# Patient Record
Sex: MALE | Race: WHITE | Employment: UNEMPLOYED | URBAN - METROPOLITAN AREA
[De-identification: names, ages, dates, MRNs, and addresses within clinical notes are randomized per-mention and may not be internally consistent; named-entity substitution may affect disease eponyms.]

---

## 2019-04-22 ENCOUNTER — HOSPITAL ENCOUNTER (OUTPATIENT)
Dept: OCCUPATIONAL MEDICINE | Age: 54
Discharge: HOME | End: 2019-04-22

## 2019-04-22 DIAGNOSIS — T14.90XA INJURY: ICD-10-CM

## 2020-01-01 ENCOUNTER — APPOINTMENT (OUTPATIENT)
Dept: RADIATION ONCOLOGY | Age: 55
End: 2020-01-01
Payer: MEDICAID

## 2020-01-01 ENCOUNTER — APPOINTMENT (OUTPATIENT)
Dept: RADIATION ONCOLOGY | Age: 55
End: 2020-01-01

## 2020-01-01 ENCOUNTER — ANESTHESIA EVENT (OUTPATIENT)
Dept: ENDOSCOPY | Age: 55
End: 2020-01-01
Payer: MEDICAID

## 2020-01-01 ENCOUNTER — HOSPITAL ENCOUNTER (OUTPATIENT)
Dept: LAB | Age: 55
Discharge: HOME | End: 2020-10-22
Payer: MEDICAID

## 2020-01-01 ENCOUNTER — PATIENT OUTREACH (OUTPATIENT)
Dept: CASE MANAGEMENT | Age: 55
End: 2020-01-01

## 2020-01-01 ENCOUNTER — HOSPITAL ENCOUNTER (OUTPATIENT)
Dept: INTERVENTIONAL RADIOLOGY/VASCULAR | Age: 55
Discharge: HOME | End: 2020-05-05
Attending: INTERNAL MEDICINE
Payer: MEDICAID

## 2020-01-01 ENCOUNTER — HOSPITAL ENCOUNTER (OUTPATIENT)
Dept: RADIATION ONCOLOGY | Age: 55
Discharge: HOME | End: 2020-06-02
Payer: MEDICAID

## 2020-01-01 ENCOUNTER — APPOINTMENT (OUTPATIENT)
Dept: INFUSION THERAPY | Age: 55
End: 2020-01-01

## 2020-01-01 ENCOUNTER — HOSPITAL ENCOUNTER (OUTPATIENT)
Dept: PET IMAGING | Age: 55
Discharge: HOME | End: 2020-09-29
Attending: INTERNAL MEDICINE
Payer: MEDICAID

## 2020-01-01 ENCOUNTER — HOSPITAL ENCOUNTER (OUTPATIENT)
Dept: INFUSION THERAPY | Age: 55
Discharge: HOME | End: 2020-06-12
Payer: MEDICAID

## 2020-01-01 ENCOUNTER — HOSPITAL ENCOUNTER (OUTPATIENT)
Dept: PET IMAGING | Age: 55
Discharge: HOME | End: 2020-04-28
Attending: INTERNAL MEDICINE
Payer: COMMERCIAL

## 2020-01-01 ENCOUNTER — ANESTHESIA (OUTPATIENT)
Dept: ENDOSCOPY | Age: 55
End: 2020-01-01
Payer: MEDICAID

## 2020-01-01 ENCOUNTER — HOSPITAL ENCOUNTER (OUTPATIENT)
Dept: MRI IMAGING | Age: 55
Discharge: HOME | End: 2020-05-01
Attending: INTERNAL MEDICINE
Payer: MEDICAID

## 2020-01-01 ENCOUNTER — HOSPITAL ENCOUNTER (OUTPATIENT)
Dept: MRI IMAGING | Age: 55
Discharge: HOME | End: 2020-06-23
Attending: INTERNAL MEDICINE
Payer: MEDICAID

## 2020-01-01 ENCOUNTER — HOSPITAL ENCOUNTER (OUTPATIENT)
Dept: INFUSION THERAPY | Age: 55
Discharge: HOME | End: 2020-12-05
Payer: MEDICAID

## 2020-01-01 ENCOUNTER — HOSPITAL ENCOUNTER (OUTPATIENT)
Dept: INFUSION THERAPY | Age: 55
End: 2020-01-01

## 2020-01-01 ENCOUNTER — HOSPITAL ENCOUNTER (OUTPATIENT)
Dept: LAB | Age: 55
Discharge: HOME | End: 2020-06-12
Payer: MEDICAID

## 2020-01-01 ENCOUNTER — HOSPITAL ENCOUNTER (OUTPATIENT)
Dept: INFUSION THERAPY | Age: 55
Discharge: HOME | End: 2020-10-24

## 2020-01-01 ENCOUNTER — HOSPITAL ENCOUNTER (OUTPATIENT)
Dept: LAB | Age: 55
Discharge: HOME | End: 2020-11-10
Payer: MEDICAID

## 2020-01-01 ENCOUNTER — HOSPITAL ENCOUNTER (OUTPATIENT)
Dept: INFUSION THERAPY | Age: 55
Discharge: HOME | End: 2020-12-26
Payer: MEDICAID

## 2020-01-01 ENCOUNTER — HOSPITAL ENCOUNTER (OUTPATIENT)
Dept: INFUSION THERAPY | Age: 55
Discharge: HOME | End: 2020-08-21
Payer: MEDICAID

## 2020-01-01 ENCOUNTER — HOSPITAL ENCOUNTER (OUTPATIENT)
Dept: INFUSION THERAPY | Age: 55
End: 2020-01-01
Payer: MEDICAID

## 2020-01-01 ENCOUNTER — HOSPITAL ENCOUNTER (OUTPATIENT)
Dept: CT IMAGING | Age: 55
Discharge: HOME | End: 2020-12-22
Attending: INTERNAL MEDICINE
Payer: MEDICAID

## 2020-01-01 ENCOUNTER — HOSPITAL ENCOUNTER (OUTPATIENT)
Dept: INFUSION THERAPY | Age: 55
Discharge: HOME | End: 2020-10-03
Payer: MEDICAID

## 2020-01-01 ENCOUNTER — HOSPITAL ENCOUNTER (OUTPATIENT)
Dept: LAB | Age: 55
Discharge: HOME | End: 2020-12-23
Payer: MEDICAID

## 2020-01-01 ENCOUNTER — HOSPITAL ENCOUNTER (OUTPATIENT)
Dept: LAB | Age: 55
Discharge: HOME | End: 2020-07-30
Payer: MEDICAID

## 2020-01-01 ENCOUNTER — HOSPITAL ENCOUNTER (OUTPATIENT)
Dept: RADIATION ONCOLOGY | Age: 55
Discharge: HOME | End: 2020-05-26

## 2020-01-01 ENCOUNTER — HOSPITAL ENCOUNTER (OUTPATIENT)
Age: 55
Setting detail: OUTPATIENT SURGERY
Discharge: HOME | End: 2020-04-29
Attending: INTERNAL MEDICINE | Admitting: INTERNAL MEDICINE
Payer: COMMERCIAL

## 2020-01-01 ENCOUNTER — HOSPITAL ENCOUNTER (OUTPATIENT)
Dept: CT IMAGING | Age: 55
Discharge: HOME | End: 2020-10-10
Attending: INTERNAL MEDICINE
Payer: MEDICAID

## 2020-01-01 ENCOUNTER — ANESTHESIA EVENT (OUTPATIENT)
Dept: SURGERY | Age: 55
End: 2020-01-01
Payer: MEDICAID

## 2020-01-01 ENCOUNTER — HOSPITAL ENCOUNTER (OUTPATIENT)
Age: 55
Setting detail: OUTPATIENT SURGERY
Discharge: HOME | End: 2020-06-03
Attending: INTERNAL MEDICINE | Admitting: INTERNAL MEDICINE
Payer: MEDICAID

## 2020-01-01 ENCOUNTER — HOSPITAL ENCOUNTER (OUTPATIENT)
Age: 55
Setting detail: OUTPATIENT SURGERY
Discharge: HOME | End: 2020-05-05
Attending: RADIOLOGY | Admitting: RADIOLOGY
Payer: MEDICAID

## 2020-01-01 ENCOUNTER — HOSPITAL ENCOUNTER (OUTPATIENT)
Dept: RADIATION ONCOLOGY | Age: 55
Discharge: HOME | End: 2020-06-25
Payer: MEDICAID

## 2020-01-01 ENCOUNTER — HOSPITAL ENCOUNTER (OUTPATIENT)
Dept: INFUSION THERAPY | Age: 55
Discharge: HOME | End: 2020-07-31
Payer: MEDICAID

## 2020-01-01 ENCOUNTER — HOSPITAL ENCOUNTER (EMERGENCY)
Age: 55
Discharge: HOME | End: 2020-06-16
Attending: EMERGENCY MEDICINE
Payer: MEDICAID

## 2020-01-01 ENCOUNTER — HOSPITAL ENCOUNTER (OUTPATIENT)
Dept: RADIATION ONCOLOGY | Age: 55
Discharge: HOME | End: 2020-06-29
Payer: MEDICAID

## 2020-01-01 ENCOUNTER — HOSPITAL ENCOUNTER (OUTPATIENT)
Dept: INFUSION THERAPY | Age: 55
Discharge: HOME | End: 2020-06-29
Payer: MEDICAID

## 2020-01-01 ENCOUNTER — HOSPITAL ENCOUNTER (OUTPATIENT)
Dept: LAB | Age: 55
Discharge: HOME | End: 2020-08-20
Payer: MEDICAID

## 2020-01-01 ENCOUNTER — APPOINTMENT (OUTPATIENT)
Dept: GENERAL RADIOLOGY | Age: 55
End: 2020-01-01
Attending: STUDENT IN AN ORGANIZED HEALTH CARE EDUCATION/TRAINING PROGRAM
Payer: MEDICAID

## 2020-01-01 ENCOUNTER — HOSPITAL ENCOUNTER (EMERGENCY)
Age: 55
Discharge: HOME | End: 2020-04-16
Payer: COMMERCIAL

## 2020-01-01 ENCOUNTER — HOSPITAL ENCOUNTER (OUTPATIENT)
Dept: INFUSION THERAPY | Age: 55
Discharge: HOME | End: 2020-06-18
Payer: MEDICAID

## 2020-01-01 ENCOUNTER — APPOINTMENT (OUTPATIENT)
Dept: CT IMAGING | Age: 55
End: 2020-01-01
Attending: EMERGENCY MEDICINE
Payer: MEDICAID

## 2020-01-01 ENCOUNTER — HOSPITAL ENCOUNTER (OUTPATIENT)
Dept: LAB | Age: 55
Discharge: HOME | End: 2020-05-08
Payer: MEDICAID

## 2020-01-01 ENCOUNTER — HOSPITAL ENCOUNTER (OUTPATIENT)
Dept: LAB | Age: 55
Discharge: HOME | End: 2020-12-03
Payer: MEDICAID

## 2020-01-01 ENCOUNTER — HOSPITAL ENCOUNTER (OUTPATIENT)
Dept: LAB | Age: 55
Discharge: HOME | End: 2020-09-10
Payer: MEDICAID

## 2020-01-01 ENCOUNTER — HOSPITAL ENCOUNTER (OUTPATIENT)
Dept: INFUSION THERAPY | Age: 55
Discharge: HOME | End: 2020-09-12
Payer: MEDICAID

## 2020-01-01 ENCOUNTER — APPOINTMENT (OUTPATIENT)
Dept: CT IMAGING | Age: 55
End: 2020-01-01
Payer: COMMERCIAL

## 2020-01-01 ENCOUNTER — HOSPITAL ENCOUNTER (OUTPATIENT)
Dept: LAB | Age: 55
Discharge: HOME | End: 2020-10-01
Payer: MEDICAID

## 2020-01-01 ENCOUNTER — ANESTHESIA (OUTPATIENT)
Dept: SURGERY | Age: 55
End: 2020-01-01
Payer: MEDICAID

## 2020-01-01 ENCOUNTER — HOSPITAL ENCOUNTER (OUTPATIENT)
Dept: MRI IMAGING | Age: 55
Discharge: HOME | End: 2020-09-15
Attending: NURSE PRACTITIONER
Payer: MEDICAID

## 2020-01-01 ENCOUNTER — HOSPITAL ENCOUNTER (OUTPATIENT)
Dept: RADIATION ONCOLOGY | Age: 55
Discharge: HOME | End: 2020-05-04
Payer: MEDICAID

## 2020-01-01 ENCOUNTER — HOSPITAL ENCOUNTER (OUTPATIENT)
Dept: INFUSION THERAPY | Age: 55
Discharge: HOME | End: 2020-11-14
Payer: MEDICAID

## 2020-01-01 ENCOUNTER — HOSPITAL ENCOUNTER (EMERGENCY)
Age: 55
Discharge: HOME | End: 2020-07-02
Attending: STUDENT IN AN ORGANIZED HEALTH CARE EDUCATION/TRAINING PROGRAM
Payer: MEDICAID

## 2020-01-01 ENCOUNTER — NURSE NAVIGATOR (OUTPATIENT)
Dept: CASE MANAGEMENT | Age: 55
End: 2020-01-01

## 2020-01-01 ENCOUNTER — HOSPITAL ENCOUNTER (OUTPATIENT)
Dept: RADIATION ONCOLOGY | Age: 55
Discharge: HOME | End: 2020-06-04
Payer: MEDICAID

## 2020-01-01 ENCOUNTER — APPOINTMENT (OUTPATIENT)
Dept: GENERAL RADIOLOGY | Age: 55
End: 2020-01-01
Attending: EMERGENCY MEDICINE
Payer: MEDICAID

## 2020-01-01 ENCOUNTER — HOSPITAL ENCOUNTER (OUTPATIENT)
Dept: RADIATION ONCOLOGY | Age: 55
Discharge: HOME | End: 2020-06-30
Payer: MEDICAID

## 2020-01-01 VITALS
WEIGHT: 242 LBS | BODY MASS INDEX: 33.88 KG/M2 | HEART RATE: 70 BPM | OXYGEN SATURATION: 97 % | HEIGHT: 71 IN | SYSTOLIC BLOOD PRESSURE: 140 MMHG | DIASTOLIC BLOOD PRESSURE: 62 MMHG | RESPIRATION RATE: 16 BRPM | TEMPERATURE: 98.1 F

## 2020-01-01 VITALS
OXYGEN SATURATION: 95 % | HEART RATE: 65 BPM | BODY MASS INDEX: 33.21 KG/M2 | WEIGHT: 237.2 LBS | TEMPERATURE: 97.7 F | SYSTOLIC BLOOD PRESSURE: 150 MMHG | RESPIRATION RATE: 18 BRPM | DIASTOLIC BLOOD PRESSURE: 80 MMHG | HEIGHT: 71 IN

## 2020-01-01 VITALS
HEART RATE: 73 BPM | WEIGHT: 235.8 LBS | BODY MASS INDEX: 32.89 KG/M2 | DIASTOLIC BLOOD PRESSURE: 92 MMHG | TEMPERATURE: 96.9 F | OXYGEN SATURATION: 95 % | RESPIRATION RATE: 18 BRPM | SYSTOLIC BLOOD PRESSURE: 156 MMHG

## 2020-01-01 VITALS
RESPIRATION RATE: 25 BRPM | WEIGHT: 246 LBS | SYSTOLIC BLOOD PRESSURE: 177 MMHG | OXYGEN SATURATION: 94 % | DIASTOLIC BLOOD PRESSURE: 79 MMHG | HEART RATE: 67 BPM | BODY MASS INDEX: 34.44 KG/M2 | HEIGHT: 71 IN | TEMPERATURE: 97.6 F

## 2020-01-01 VITALS
SYSTOLIC BLOOD PRESSURE: 154 MMHG | TEMPERATURE: 97.6 F | WEIGHT: 235.8 LBS | HEART RATE: 77 BPM | OXYGEN SATURATION: 96 % | DIASTOLIC BLOOD PRESSURE: 94 MMHG | BODY MASS INDEX: 32.89 KG/M2 | RESPIRATION RATE: 16 BRPM

## 2020-01-01 VITALS
OXYGEN SATURATION: 97 % | RESPIRATION RATE: 15 BRPM | HEART RATE: 66 BPM | DIASTOLIC BLOOD PRESSURE: 86 MMHG | SYSTOLIC BLOOD PRESSURE: 141 MMHG | TEMPERATURE: 97.8 F

## 2020-01-01 VITALS
TEMPERATURE: 96.9 F | SYSTOLIC BLOOD PRESSURE: 140 MMHG | OXYGEN SATURATION: 97 % | DIASTOLIC BLOOD PRESSURE: 82 MMHG | WEIGHT: 232 LBS | BODY MASS INDEX: 32.36 KG/M2 | RESPIRATION RATE: 18 BRPM | HEART RATE: 66 BPM

## 2020-01-01 VITALS
SYSTOLIC BLOOD PRESSURE: 122 MMHG | TEMPERATURE: 97.6 F | HEART RATE: 71 BPM | OXYGEN SATURATION: 92 % | DIASTOLIC BLOOD PRESSURE: 88 MMHG

## 2020-01-01 VITALS
DIASTOLIC BLOOD PRESSURE: 73 MMHG | TEMPERATURE: 97.1 F | HEART RATE: 65 BPM | BODY MASS INDEX: 33.77 KG/M2 | OXYGEN SATURATION: 96 % | WEIGHT: 242.1 LBS | SYSTOLIC BLOOD PRESSURE: 144 MMHG

## 2020-01-01 VITALS
BODY MASS INDEX: 33.6 KG/M2 | OXYGEN SATURATION: 97 % | SYSTOLIC BLOOD PRESSURE: 167 MMHG | WEIGHT: 240 LBS | DIASTOLIC BLOOD PRESSURE: 87 MMHG | HEIGHT: 71 IN | RESPIRATION RATE: 16 BRPM | TEMPERATURE: 98 F | HEART RATE: 70 BPM

## 2020-01-01 VITALS
HEIGHT: 70 IN | WEIGHT: 260 LBS | OXYGEN SATURATION: 98 % | TEMPERATURE: 98.4 F | RESPIRATION RATE: 17 BRPM | HEART RATE: 78 BPM | DIASTOLIC BLOOD PRESSURE: 78 MMHG | SYSTOLIC BLOOD PRESSURE: 142 MMHG | BODY MASS INDEX: 37.22 KG/M2

## 2020-01-01 VITALS
SYSTOLIC BLOOD PRESSURE: 155 MMHG | BODY MASS INDEX: 32.89 KG/M2 | TEMPERATURE: 97.4 F | OXYGEN SATURATION: 97 % | DIASTOLIC BLOOD PRESSURE: 89 MMHG | WEIGHT: 235.8 LBS | RESPIRATION RATE: 18 BRPM | HEART RATE: 70 BPM

## 2020-01-01 VITALS
HEART RATE: 59 BPM | SYSTOLIC BLOOD PRESSURE: 126 MMHG | BODY MASS INDEX: 33.74 KG/M2 | WEIGHT: 241 LBS | OXYGEN SATURATION: 94 % | DIASTOLIC BLOOD PRESSURE: 70 MMHG | RESPIRATION RATE: 14 BRPM | HEIGHT: 71 IN | TEMPERATURE: 98.6 F

## 2020-01-01 VITALS
DIASTOLIC BLOOD PRESSURE: 77 MMHG | SYSTOLIC BLOOD PRESSURE: 134 MMHG | TEMPERATURE: 97.8 F | OXYGEN SATURATION: 96 % | WEIGHT: 246.2 LBS | HEART RATE: 65 BPM | BODY MASS INDEX: 34.34 KG/M2

## 2020-01-01 VITALS
RESPIRATION RATE: 18 BRPM | TEMPERATURE: 97.2 F | BODY MASS INDEX: 32.72 KG/M2 | SYSTOLIC BLOOD PRESSURE: 146 MMHG | OXYGEN SATURATION: 96 % | DIASTOLIC BLOOD PRESSURE: 87 MMHG | HEART RATE: 73 BPM | WEIGHT: 234.6 LBS

## 2020-01-01 VITALS
OXYGEN SATURATION: 93 % | RESPIRATION RATE: 18 BRPM | BODY MASS INDEX: 32.93 KG/M2 | TEMPERATURE: 97.4 F | HEART RATE: 87 BPM | SYSTOLIC BLOOD PRESSURE: 141 MMHG | WEIGHT: 235.2 LBS | DIASTOLIC BLOOD PRESSURE: 93 MMHG | HEIGHT: 71 IN

## 2020-01-01 VITALS
HEART RATE: 61 BPM | OXYGEN SATURATION: 96 % | RESPIRATION RATE: 16 BRPM | SYSTOLIC BLOOD PRESSURE: 139 MMHG | DIASTOLIC BLOOD PRESSURE: 63 MMHG | TEMPERATURE: 97.7 F

## 2020-01-01 DIAGNOSIS — R91.8 MASS OF RIGHT LUNG: ICD-10-CM

## 2020-01-01 DIAGNOSIS — C34.82 MALIGNANT NEOPLASM OF OVERLAPPING SITES OF LEFT LUNG (HCC): ICD-10-CM

## 2020-01-01 DIAGNOSIS — C34.82 MALIGNANT NEOPLASM OF OVERLAPPING SITES OF LEFT LUNG (HCC): Primary | ICD-10-CM

## 2020-01-01 DIAGNOSIS — C79.31 BRAIN METASTASES (HCC): ICD-10-CM

## 2020-01-01 DIAGNOSIS — Z79.899 HIGH RISK MEDICATION USE: ICD-10-CM

## 2020-01-01 DIAGNOSIS — R11.2 NAUSEA AND VOMITING, INTRACTABILITY OF VOMITING NOT SPECIFIED, UNSPECIFIED VOMITING TYPE: Primary | ICD-10-CM

## 2020-01-01 DIAGNOSIS — R06.02 SOB (SHORTNESS OF BREATH): Primary | ICD-10-CM

## 2020-01-01 DIAGNOSIS — E53.8 B12 DEFICIENCY: Primary | ICD-10-CM

## 2020-01-01 DIAGNOSIS — R91.8 LUNG MASS: ICD-10-CM

## 2020-01-01 DIAGNOSIS — C34.2 LUNG CANCER, MIDDLE LOBE (HCC): ICD-10-CM

## 2020-01-01 DIAGNOSIS — R06.09 DYSPNEA ON EXERTION: ICD-10-CM

## 2020-01-01 DIAGNOSIS — F19.982 DRUG-INDUCED INSOMNIA (HCC): ICD-10-CM

## 2020-01-01 DIAGNOSIS — Z01.818 PRE-OP TESTING: Primary | ICD-10-CM

## 2020-01-01 DIAGNOSIS — C34.90 NON-SMALL CELL LUNG CANCER, UNSPECIFIED LATERALITY (HCC): ICD-10-CM

## 2020-01-01 DIAGNOSIS — R22.2 NODULE OF ANTERIOR CHEST WALL: ICD-10-CM

## 2020-01-01 DIAGNOSIS — R07.89 ATYPICAL CHEST PAIN: Primary | ICD-10-CM

## 2020-01-01 LAB
ALBUMIN SERPL-MCNC: 3.3 G/DL (ref 3.5–5)
ALBUMIN SERPL-MCNC: 3.4 G/DL (ref 3.5–5)
ALBUMIN SERPL-MCNC: 3.5 G/DL (ref 3.5–5)
ALBUMIN SERPL-MCNC: 3.7 G/DL (ref 3.5–5)
ALBUMIN SERPL-MCNC: 3.7 G/DL (ref 3.5–5)
ALBUMIN SERPL-MCNC: 3.8 G/DL (ref 3.5–5)
ALBUMIN SERPL-MCNC: 3.9 G/DL (ref 3.5–5)
ALBUMIN/GLOB SERPL: 0.8 {RATIO} (ref 1.2–3.5)
ALBUMIN/GLOB SERPL: 0.9 {RATIO} (ref 1.2–3.5)
ALBUMIN/GLOB SERPL: 1 {RATIO} (ref 1.2–3.5)
ALP SERPL-CCNC: 119 U/L (ref 50–136)
ALP SERPL-CCNC: 122 U/L (ref 50–136)
ALP SERPL-CCNC: 126 U/L (ref 50–136)
ALP SERPL-CCNC: 128 U/L (ref 50–136)
ALP SERPL-CCNC: 132 U/L (ref 50–136)
ALP SERPL-CCNC: 133 U/L (ref 50–136)
ALP SERPL-CCNC: 134 U/L (ref 50–136)
ALP SERPL-CCNC: 135 U/L (ref 50–136)
ALP SERPL-CCNC: 139 U/L (ref 50–136)
ALP SERPL-CCNC: 144 U/L (ref 50–136)
ALP SERPL-CCNC: 146 U/L (ref 50–136)
ALP SERPL-CCNC: 146 U/L (ref 50–136)
ALP SERPL-CCNC: 148 U/L (ref 50–136)
ALT SERPL-CCNC: 107 U/L (ref 12–65)
ALT SERPL-CCNC: 116 U/L (ref 12–65)
ALT SERPL-CCNC: 33 U/L (ref 12–65)
ALT SERPL-CCNC: 34 U/L (ref 12–65)
ALT SERPL-CCNC: 36 U/L (ref 12–65)
ALT SERPL-CCNC: 37 U/L (ref 12–65)
ALT SERPL-CCNC: 38 U/L (ref 12–65)
ALT SERPL-CCNC: 40 U/L (ref 12–65)
ALT SERPL-CCNC: 44 U/L (ref 12–65)
ALT SERPL-CCNC: 44 U/L (ref 12–65)
ALT SERPL-CCNC: 47 U/L (ref 12–65)
ALT SERPL-CCNC: 51 U/L (ref 12–65)
ALT SERPL-CCNC: 58 U/L (ref 12–65)
ANION GAP SERPL CALC-SCNC: 3 MMOL/L (ref 7–16)
ANION GAP SERPL CALC-SCNC: 4 MMOL/L (ref 7–16)
ANION GAP SERPL CALC-SCNC: 4 MMOL/L (ref 7–16)
ANION GAP SERPL CALC-SCNC: 5 MMOL/L (ref 7–16)
ANION GAP SERPL CALC-SCNC: 6 MMOL/L (ref 7–16)
ANION GAP SERPL CALC-SCNC: 8 MMOL/L (ref 7–16)
ANION GAP SERPL CALC-SCNC: 9 MMOL/L (ref 7–16)
AST SERPL-CCNC: 16 U/L (ref 15–37)
AST SERPL-CCNC: 19 U/L (ref 15–37)
AST SERPL-CCNC: 21 U/L (ref 15–37)
AST SERPL-CCNC: 23 U/L (ref 15–37)
AST SERPL-CCNC: 23 U/L (ref 15–37)
AST SERPL-CCNC: 25 U/L (ref 15–37)
AST SERPL-CCNC: 28 U/L (ref 15–37)
AST SERPL-CCNC: 28 U/L (ref 15–37)
AST SERPL-CCNC: 29 U/L (ref 15–37)
AST SERPL-CCNC: 35 U/L (ref 15–37)
AST SERPL-CCNC: 35 U/L (ref 15–37)
AST SERPL-CCNC: 42 U/L (ref 15–37)
AST SERPL-CCNC: 64 U/L (ref 15–37)
ATRIAL RATE: 73 BPM
ATRIAL RATE: 79 BPM
BACTERIA SPEC CULT: NORMAL
BACTERIA SPEC CULT: NORMAL
BASOPHILS # BLD: 0 K/UL (ref 0–0.2)
BASOPHILS # BLD: 0.1 K/UL (ref 0–0.2)
BASOPHILS NFR BLD: 0 % (ref 0–2)
BASOPHILS NFR BLD: 1 % (ref 0–2)
BILIRUB SERPL-MCNC: 0.3 MG/DL (ref 0.2–1.1)
BILIRUB SERPL-MCNC: 0.3 MG/DL (ref 0.2–1.1)
BILIRUB SERPL-MCNC: 0.4 MG/DL (ref 0.2–1.1)
BILIRUB SERPL-MCNC: 0.5 MG/DL (ref 0.2–1.1)
BNP SERPL-MCNC: 24 PG/ML (ref 5–125)
BUN SERPL-MCNC: 10 MG/DL (ref 6–23)
BUN SERPL-MCNC: 11 MG/DL (ref 6–23)
BUN SERPL-MCNC: 11 MG/DL (ref 6–23)
BUN SERPL-MCNC: 12 MG/DL (ref 6–23)
BUN SERPL-MCNC: 16 MG/DL (ref 6–23)
BUN SERPL-MCNC: 5 MG/DL (ref 6–23)
BUN SERPL-MCNC: 8 MG/DL (ref 6–23)
BUN SERPL-MCNC: 9 MG/DL (ref 6–23)
CALCIUM SERPL-MCNC: 8.9 MG/DL (ref 8.3–10.4)
CALCIUM SERPL-MCNC: 9 MG/DL (ref 8.3–10.4)
CALCIUM SERPL-MCNC: 9.2 MG/DL (ref 8.3–10.4)
CALCIUM SERPL-MCNC: 9.3 MG/DL (ref 8.3–10.4)
CALCIUM SERPL-MCNC: 9.3 MG/DL (ref 8.3–10.4)
CALCIUM SERPL-MCNC: 9.4 MG/DL (ref 8.3–10.4)
CALCIUM SERPL-MCNC: 9.5 MG/DL (ref 8.3–10.4)
CALCIUM SERPL-MCNC: 9.6 MG/DL (ref 8.3–10.4)
CALCIUM SERPL-MCNC: 9.8 MG/DL (ref 8.3–10.4)
CALCULATED P AXIS, ECG09: 56 DEGREES
CALCULATED P AXIS, ECG09: 61 DEGREES
CALCULATED R AXIS, ECG10: -35 DEGREES
CALCULATED R AXIS, ECG10: -70 DEGREES
CALCULATED T AXIS, ECG11: 56 DEGREES
CALCULATED T AXIS, ECG11: 74 DEGREES
CHLORIDE SERPL-SCNC: 101 MMOL/L (ref 98–107)
CHLORIDE SERPL-SCNC: 102 MMOL/L (ref 98–107)
CHLORIDE SERPL-SCNC: 103 MMOL/L (ref 98–107)
CHLORIDE SERPL-SCNC: 99 MMOL/L (ref 98–107)
CO2 SERPL-SCNC: 26 MMOL/L (ref 21–32)
CO2 SERPL-SCNC: 27 MMOL/L (ref 21–32)
CO2 SERPL-SCNC: 28 MMOL/L (ref 21–32)
CO2 SERPL-SCNC: 29 MMOL/L (ref 21–32)
CO2 SERPL-SCNC: 30 MMOL/L (ref 21–32)
CREAT BLD-MCNC: 1.3 MG/DL (ref 0.8–1.5)
CREAT SERPL-MCNC: 0.83 MG/DL (ref 0.8–1.5)
CREAT SERPL-MCNC: 0.93 MG/DL (ref 0.8–1.5)
CREAT SERPL-MCNC: 1 MG/DL (ref 0.8–1.5)
CREAT SERPL-MCNC: 1.1 MG/DL (ref 0.8–1.5)
CREAT SERPL-MCNC: 1.12 MG/DL (ref 0.8–1.5)
CREAT SERPL-MCNC: 1.2 MG/DL (ref 0.8–1.5)
CREAT SERPL-MCNC: 1.4 MG/DL (ref 0.8–1.5)
DIAGNOSIS, 93000: NORMAL
DIAGNOSIS, 93000: NORMAL
DIFFERENTIAL METHOD BLD: ABNORMAL
EOSINOPHIL # BLD: 0.1 K/UL (ref 0–0.8)
EOSINOPHIL # BLD: 0.2 K/UL (ref 0–0.8)
EOSINOPHIL # BLD: 0.3 K/UL (ref 0–0.8)
EOSINOPHIL # BLD: 0.3 K/UL (ref 0–0.8)
EOSINOPHIL # BLD: 0.4 K/UL (ref 0–0.8)
EOSINOPHIL NFR BLD: 1 % (ref 0.5–7.8)
EOSINOPHIL NFR BLD: 2 % (ref 0.5–7.8)
EOSINOPHIL NFR BLD: 3 % (ref 0.5–7.8)
EOSINOPHIL NFR BLD: 4 % (ref 0.5–7.8)
EOSINOPHIL NFR BLD: 4 % (ref 0.5–7.8)
ERYTHROCYTE [DISTWIDTH] IN BLOOD BY AUTOMATED COUNT: 13.2 % (ref 11.9–14.6)
ERYTHROCYTE [DISTWIDTH] IN BLOOD BY AUTOMATED COUNT: 13.3 % (ref 11.9–14.6)
ERYTHROCYTE [DISTWIDTH] IN BLOOD BY AUTOMATED COUNT: 13.4 % (ref 11.9–14.6)
ERYTHROCYTE [DISTWIDTH] IN BLOOD BY AUTOMATED COUNT: 13.4 % (ref 11.9–14.6)
ERYTHROCYTE [DISTWIDTH] IN BLOOD BY AUTOMATED COUNT: 13.8 % (ref 11.9–14.6)
ERYTHROCYTE [DISTWIDTH] IN BLOOD BY AUTOMATED COUNT: 13.8 % (ref 11.9–14.6)
ERYTHROCYTE [DISTWIDTH] IN BLOOD BY AUTOMATED COUNT: 14.4 % (ref 11.9–14.6)
ERYTHROCYTE [DISTWIDTH] IN BLOOD BY AUTOMATED COUNT: 15.5 % (ref 11.9–14.6)
ERYTHROCYTE [DISTWIDTH] IN BLOOD BY AUTOMATED COUNT: 15.8 % (ref 11.9–14.6)
ERYTHROCYTE [DISTWIDTH] IN BLOOD BY AUTOMATED COUNT: 16 % (ref 11.9–14.6)
ERYTHROCYTE [DISTWIDTH] IN BLOOD BY AUTOMATED COUNT: 17.8 % (ref 11.9–14.6)
ERYTHROCYTE [DISTWIDTH] IN BLOOD BY AUTOMATED COUNT: 18.9 % (ref 11.9–14.6)
ERYTHROCYTE [DISTWIDTH] IN BLOOD BY AUTOMATED COUNT: 20.9 % (ref 11.9–14.6)
GLOBULIN SER CALC-MCNC: 3.6 G/DL (ref 2.3–3.5)
GLOBULIN SER CALC-MCNC: 4.1 G/DL (ref 2.3–3.5)
GLOBULIN SER CALC-MCNC: 4.1 G/DL (ref 2.3–3.5)
GLOBULIN SER CALC-MCNC: 4.2 G/DL (ref 2.3–3.5)
GLOBULIN SER CALC-MCNC: 4.3 G/DL (ref 2.3–3.5)
GLOBULIN SER CALC-MCNC: 4.3 G/DL (ref 2.3–3.5)
GLOBULIN SER CALC-MCNC: 4.4 G/DL (ref 2.3–3.5)
GLOBULIN SER CALC-MCNC: 4.5 G/DL (ref 2.3–3.5)
GLOBULIN SER CALC-MCNC: 4.6 G/DL (ref 2.3–3.5)
GLOBULIN SER CALC-MCNC: 4.7 G/DL (ref 2.3–3.5)
GLUCOSE SERPL-MCNC: 106 MG/DL (ref 65–100)
GLUCOSE SERPL-MCNC: 106 MG/DL (ref 65–100)
GLUCOSE SERPL-MCNC: 107 MG/DL (ref 65–100)
GLUCOSE SERPL-MCNC: 109 MG/DL (ref 65–100)
GLUCOSE SERPL-MCNC: 119 MG/DL (ref 65–100)
GLUCOSE SERPL-MCNC: 125 MG/DL (ref 65–100)
GLUCOSE SERPL-MCNC: 156 MG/DL (ref 65–100)
GLUCOSE SERPL-MCNC: 82 MG/DL (ref 65–100)
GLUCOSE SERPL-MCNC: 86 MG/DL (ref 65–100)
GLUCOSE SERPL-MCNC: 88 MG/DL (ref 65–100)
GLUCOSE SERPL-MCNC: 92 MG/DL (ref 65–100)
GLUCOSE SERPL-MCNC: 95 MG/DL (ref 65–100)
GLUCOSE SERPL-MCNC: 99 MG/DL (ref 65–100)
HCT VFR BLD AUTO: 28.4 % (ref 41.1–50.3)
HCT VFR BLD AUTO: 29 % (ref 41.1–50.3)
HCT VFR BLD AUTO: 29.2 % (ref 41.1–50.3)
HCT VFR BLD AUTO: 29.7 % (ref 41.1–50.3)
HCT VFR BLD AUTO: 31.8 % (ref 41.1–50.3)
HCT VFR BLD AUTO: 32 % (ref 41.1–50.3)
HCT VFR BLD AUTO: 32.7 % (ref 41.1–50.3)
HCT VFR BLD AUTO: 36 % (ref 41.1–50.3)
HCT VFR BLD AUTO: 36.7 % (ref 41.1–50.3)
HCT VFR BLD AUTO: 37.9 % (ref 41.1–50.3)
HCT VFR BLD AUTO: 38.2 % (ref 41.1–50.3)
HCT VFR BLD AUTO: 39.7 % (ref 41.1–50.3)
HCT VFR BLD AUTO: 39.9 % (ref 41.1–50.3)
HGB BLD-MCNC: 10.6 G/DL (ref 13.6–17.2)
HGB BLD-MCNC: 10.6 G/DL (ref 13.6–17.2)
HGB BLD-MCNC: 11.1 G/DL (ref 13.6–17.2)
HGB BLD-MCNC: 12.1 G/DL (ref 13.6–17.2)
HGB BLD-MCNC: 12.3 G/DL (ref 13.6–17.2)
HGB BLD-MCNC: 12.4 G/DL (ref 13.6–17.2)
HGB BLD-MCNC: 13 G/DL (ref 13.6–17.2)
HGB BLD-MCNC: 13.4 G/DL (ref 13.6–17.2)
HGB BLD-MCNC: 13.7 G/DL (ref 13.6–17.2)
HGB BLD-MCNC: 9.5 G/DL (ref 13.6–17.2)
HGB BLD-MCNC: 9.7 G/DL (ref 13.6–17.2)
HGB BLD-MCNC: 9.9 G/DL (ref 13.6–17.2)
HGB BLD-MCNC: 9.9 G/DL (ref 13.6–17.2)
IMM GRANULOCYTES # BLD AUTO: 0 K/UL (ref 0–0.5)
IMM GRANULOCYTES # BLD AUTO: 0 K/UL (ref 0–0.5)
IMM GRANULOCYTES # BLD AUTO: 0.1 K/UL (ref 0–0.5)
IMM GRANULOCYTES # BLD AUTO: 0.2 K/UL (ref 0–0.5)
IMM GRANULOCYTES NFR BLD AUTO: 1 % (ref 0–5)
IMM GRANULOCYTES NFR BLD AUTO: 2 % (ref 0–5)
IMM GRANULOCYTES NFR BLD AUTO: 3 % (ref 0–5)
LACTATE SERPL-SCNC: 0.5 MMOL/L (ref 0.4–2)
LACTATE SERPL-SCNC: 1.4 MMOL/L (ref 0.4–2)
LIPASE SERPL-CCNC: 228 U/L (ref 73–393)
LYMPHOCYTES # BLD: 1.4 K/UL (ref 0.5–4.6)
LYMPHOCYTES # BLD: 1.5 K/UL (ref 0.5–4.6)
LYMPHOCYTES # BLD: 1.7 K/UL (ref 0.5–4.6)
LYMPHOCYTES # BLD: 1.7 K/UL (ref 0.5–4.6)
LYMPHOCYTES # BLD: 1.8 K/UL (ref 0.5–4.6)
LYMPHOCYTES # BLD: 1.9 K/UL (ref 0.5–4.6)
LYMPHOCYTES # BLD: 1.9 K/UL (ref 0.5–4.6)
LYMPHOCYTES # BLD: 2 K/UL (ref 0.5–4.6)
LYMPHOCYTES NFR BLD: 16 % (ref 13–44)
LYMPHOCYTES NFR BLD: 19 % (ref 13–44)
LYMPHOCYTES NFR BLD: 20 % (ref 13–44)
LYMPHOCYTES NFR BLD: 21 % (ref 13–44)
LYMPHOCYTES NFR BLD: 22 % (ref 13–44)
LYMPHOCYTES NFR BLD: 22 % (ref 13–44)
LYMPHOCYTES NFR BLD: 23 % (ref 13–44)
LYMPHOCYTES NFR BLD: 23 % (ref 13–44)
LYMPHOCYTES NFR BLD: 24 % (ref 13–44)
LYMPHOCYTES NFR BLD: 25 % (ref 13–44)
LYMPHOCYTES NFR BLD: 25 % (ref 13–44)
LYMPHOCYTES NFR BLD: 26 % (ref 13–44)
LYMPHOCYTES NFR BLD: 28 % (ref 13–44)
MAGNESIUM SERPL-MCNC: 1.8 MG/DL (ref 1.8–2.4)
MAGNESIUM SERPL-MCNC: 2 MG/DL (ref 1.8–2.4)
MAGNESIUM SERPL-MCNC: 2.2 MG/DL (ref 1.8–2.4)
MAGNESIUM SERPL-MCNC: 2.3 MG/DL (ref 1.8–2.4)
MCH RBC QN AUTO: 30 PG (ref 26.1–32.9)
MCH RBC QN AUTO: 30.2 PG (ref 26.1–32.9)
MCH RBC QN AUTO: 30.3 PG (ref 26.1–32.9)
MCH RBC QN AUTO: 30.4 PG (ref 26.1–32.9)
MCH RBC QN AUTO: 30.4 PG (ref 26.1–32.9)
MCH RBC QN AUTO: 31 PG (ref 26.1–32.9)
MCH RBC QN AUTO: 31.1 PG (ref 26.1–32.9)
MCH RBC QN AUTO: 32.1 PG (ref 26.1–32.9)
MCH RBC QN AUTO: 32.7 PG (ref 26.1–32.9)
MCH RBC QN AUTO: 33.3 PG (ref 26.1–32.9)
MCH RBC QN AUTO: 33.4 PG (ref 26.1–32.9)
MCH RBC QN AUTO: 33.4 PG (ref 26.1–32.9)
MCH RBC QN AUTO: 33.8 PG (ref 26.1–32.9)
MCHC RBC AUTO-ENTMCNC: 32.7 G/DL (ref 31.4–35)
MCHC RBC AUTO-ENTMCNC: 33.1 G/DL (ref 31.4–35)
MCHC RBC AUTO-ENTMCNC: 33.3 G/DL (ref 31.4–35)
MCHC RBC AUTO-ENTMCNC: 33.3 G/DL (ref 31.4–35)
MCHC RBC AUTO-ENTMCNC: 33.4 G/DL (ref 31.4–35)
MCHC RBC AUTO-ENTMCNC: 33.5 G/DL (ref 31.4–35)
MCHC RBC AUTO-ENTMCNC: 33.5 G/DL (ref 31.4–35)
MCHC RBC AUTO-ENTMCNC: 33.6 G/DL (ref 31.4–35)
MCHC RBC AUTO-ENTMCNC: 33.8 G/DL (ref 31.4–35)
MCHC RBC AUTO-ENTMCNC: 33.9 G/DL (ref 31.4–35)
MCHC RBC AUTO-ENTMCNC: 33.9 G/DL (ref 31.4–35)
MCHC RBC AUTO-ENTMCNC: 34 G/DL (ref 31.4–35)
MCHC RBC AUTO-ENTMCNC: 34.3 G/DL (ref 31.4–35)
MCV RBC AUTO: 100.3 FL (ref 79.6–97.8)
MCV RBC AUTO: 100.3 FL (ref 79.6–97.8)
MCV RBC AUTO: 101 FL (ref 79.6–97.8)
MCV RBC AUTO: 88.5 FL (ref 79.6–97.8)
MCV RBC AUTO: 88.8 FL (ref 79.6–97.8)
MCV RBC AUTO: 90 FL (ref 79.6–97.8)
MCV RBC AUTO: 90.8 FL (ref 79.6–97.8)
MCV RBC AUTO: 91.2 FL (ref 79.6–97.8)
MCV RBC AUTO: 91.6 FL (ref 79.6–97.8)
MCV RBC AUTO: 92.4 FL (ref 79.6–97.8)
MCV RBC AUTO: 94.8 FL (ref 79.6–97.8)
MCV RBC AUTO: 98.8 FL (ref 79.6–97.8)
MCV RBC AUTO: 99.6 FL (ref 79.6–97.8)
MONOCYTES # BLD: 0.5 K/UL (ref 0.1–1.3)
MONOCYTES # BLD: 0.5 K/UL (ref 0.1–1.3)
MONOCYTES # BLD: 0.6 K/UL (ref 0.1–1.3)
MONOCYTES # BLD: 0.6 K/UL (ref 0.1–1.3)
MONOCYTES # BLD: 0.7 K/UL (ref 0.1–1.3)
MONOCYTES # BLD: 0.8 K/UL (ref 0.1–1.3)
MONOCYTES # BLD: 0.9 K/UL (ref 0.1–1.3)
MONOCYTES # BLD: 1 K/UL (ref 0.1–1.3)
MONOCYTES # BLD: 1.1 K/UL (ref 0.1–1.3)
MONOCYTES # BLD: 1.1 K/UL (ref 0.1–1.3)
MONOCYTES # BLD: 1.2 K/UL (ref 0.1–1.3)
MONOCYTES NFR BLD: 10 % (ref 4–12)
MONOCYTES NFR BLD: 10 % (ref 4–12)
MONOCYTES NFR BLD: 11 % (ref 4–12)
MONOCYTES NFR BLD: 13 % (ref 4–12)
MONOCYTES NFR BLD: 6 % (ref 4–12)
MONOCYTES NFR BLD: 6 % (ref 4–12)
MONOCYTES NFR BLD: 7 % (ref 4–12)
MONOCYTES NFR BLD: 8 % (ref 4–12)
MONOCYTES NFR BLD: 8 % (ref 4–12)
MONOCYTES NFR BLD: 9 % (ref 4–12)
NEUTS SEG # BLD: 3.4 K/UL (ref 1.7–8.2)
NEUTS SEG # BLD: 3.9 K/UL (ref 1.7–8.2)
NEUTS SEG # BLD: 4.1 K/UL (ref 1.7–8.2)
NEUTS SEG # BLD: 4.5 K/UL (ref 1.7–8.2)
NEUTS SEG # BLD: 5.1 K/UL (ref 1.7–8.2)
NEUTS SEG # BLD: 5.2 K/UL (ref 1.7–8.2)
NEUTS SEG # BLD: 5.4 K/UL (ref 1.7–8.2)
NEUTS SEG # BLD: 5.5 K/UL (ref 1.7–8.2)
NEUTS SEG # BLD: 5.5 K/UL (ref 1.7–8.2)
NEUTS SEG # BLD: 5.6 K/UL (ref 1.7–8.2)
NEUTS SEG # BLD: 6.1 K/UL (ref 1.7–8.2)
NEUTS SEG # BLD: 6.7 K/UL (ref 1.7–8.2)
NEUTS SEG # BLD: 7.9 K/UL (ref 1.7–8.2)
NEUTS SEG NFR BLD: 57 % (ref 43–78)
NEUTS SEG NFR BLD: 58 % (ref 43–78)
NEUTS SEG NFR BLD: 60 % (ref 43–78)
NEUTS SEG NFR BLD: 60 % (ref 43–78)
NEUTS SEG NFR BLD: 62 % (ref 43–78)
NEUTS SEG NFR BLD: 63 % (ref 43–78)
NEUTS SEG NFR BLD: 64 % (ref 43–78)
NEUTS SEG NFR BLD: 64 % (ref 43–78)
NEUTS SEG NFR BLD: 66 % (ref 43–78)
NEUTS SEG NFR BLD: 66 % (ref 43–78)
NEUTS SEG NFR BLD: 67 % (ref 43–78)
NEUTS SEG NFR BLD: 69 % (ref 43–78)
NEUTS SEG NFR BLD: 72 % (ref 43–78)
NRBC # BLD: 0 K/UL (ref 0–0.2)
NRBC # BLD: 0.02 K/UL (ref 0–0.2)
P-R INTERVAL, ECG05: 204 MS
PLATELET # BLD AUTO: 118 K/UL (ref 150–450)
PLATELET # BLD AUTO: 135 K/UL (ref 150–450)
PLATELET # BLD AUTO: 139 K/UL (ref 150–450)
PLATELET # BLD AUTO: 213 K/UL (ref 150–450)
PLATELET # BLD AUTO: 236 K/UL (ref 150–450)
PLATELET # BLD AUTO: 240 K/UL (ref 150–450)
PLATELET # BLD AUTO: 250 K/UL (ref 150–450)
PLATELET # BLD AUTO: 250 K/UL (ref 150–450)
PLATELET # BLD AUTO: 274 K/UL (ref 150–450)
PLATELET # BLD AUTO: 310 K/UL (ref 150–450)
PLATELET # BLD AUTO: 319 K/UL (ref 150–450)
PLATELET # BLD AUTO: 325 K/UL (ref 150–450)
PLATELET # BLD AUTO: 328 K/UL (ref 150–450)
PMV BLD AUTO: 8.4 FL (ref 9.4–12.3)
PMV BLD AUTO: 8.5 FL (ref 9.4–12.3)
PMV BLD AUTO: 8.6 FL (ref 9.4–12.3)
PMV BLD AUTO: 8.6 FL (ref 9.4–12.3)
PMV BLD AUTO: 8.7 FL (ref 9.4–12.3)
PMV BLD AUTO: 8.7 FL (ref 9.4–12.3)
PMV BLD AUTO: 8.8 FL (ref 9.4–12.3)
PMV BLD AUTO: 8.9 FL (ref 9.4–12.3)
PMV BLD AUTO: 9 FL (ref 9.4–12.3)
PMV BLD AUTO: 9.1 FL (ref 9.4–12.3)
PMV BLD AUTO: 9.4 FL (ref 9.4–12.3)
POTASSIUM SERPL-SCNC: 3.8 MMOL/L (ref 3.5–5.1)
POTASSIUM SERPL-SCNC: 3.9 MMOL/L (ref 3.5–5.1)
POTASSIUM SERPL-SCNC: 4 MMOL/L (ref 3.5–5.1)
POTASSIUM SERPL-SCNC: 4.1 MMOL/L (ref 3.5–5.1)
POTASSIUM SERPL-SCNC: 4.1 MMOL/L (ref 3.5–5.1)
POTASSIUM SERPL-SCNC: 4.2 MMOL/L (ref 3.5–5.1)
POTASSIUM SERPL-SCNC: 4.2 MMOL/L (ref 3.5–5.1)
PROCALCITONIN SERPL-MCNC: 0.94 NG/ML
PROT SERPL-MCNC: 6.9 G/DL (ref 6.3–8.2)
PROT SERPL-MCNC: 7.8 G/DL (ref 6.3–8.2)
PROT SERPL-MCNC: 7.8 G/DL (ref 6.3–8.2)
PROT SERPL-MCNC: 7.9 G/DL (ref 6.3–8.2)
PROT SERPL-MCNC: 7.9 G/DL (ref 6.3–8.2)
PROT SERPL-MCNC: 8 G/DL (ref 6.3–8.2)
PROT SERPL-MCNC: 8.1 G/DL (ref 6.3–8.2)
PROT SERPL-MCNC: 8.4 G/DL (ref 6.3–8.2)
PROT SERPL-MCNC: 8.4 G/DL (ref 6.3–8.2)
PROT SERPL-MCNC: 8.5 G/DL (ref 6.3–8.2)
Q-T INTERVAL, ECG07: 350 MS
Q-T INTERVAL, ECG07: 370 MS
QRS DURATION, ECG06: 88 MS
QRS DURATION, ECG06: 92 MS
QTC CALCULATION (BEZET), ECG08: 401 MS
QTC CALCULATION (BEZET), ECG08: 407 MS
RBC # BLD AUTO: 2.85 M/UL (ref 4.23–5.67)
RBC # BLD AUTO: 2.87 M/UL (ref 4.23–5.67)
RBC # BLD AUTO: 2.96 M/UL (ref 4.23–5.67)
RBC # BLD AUTO: 3.08 M/UL (ref 4.23–5.67)
RBC # BLD AUTO: 3.17 M/UL (ref 4.23–5.67)
RBC # BLD AUTO: 3.24 M/UL (ref 4.23–5.67)
RBC # BLD AUTO: 3.57 M/UL (ref 4.23–5.67)
RBC # BLD AUTO: 4 M/UL (ref 4.23–5.6)
RBC # BLD AUTO: 4.04 M/UL (ref 4.23–5.6)
RBC # BLD AUTO: 4.1 M/UL (ref 4.23–5.6)
RBC # BLD AUTO: 4.19 M/UL (ref 4.23–5.67)
RBC # BLD AUTO: 4.47 M/UL (ref 4.23–5.67)
RBC # BLD AUTO: 4.51 M/UL (ref 4.23–5.67)
SARS-COV-2, NAA: NOT DETECTED
SARS-COV-2, NAA: NOT DETECTED
SERVICE CMNT-IMP: NORMAL
SERVICE CMNT-IMP: NORMAL
SODIUM SERPL-SCNC: 134 MMOL/L (ref 136–145)
SODIUM SERPL-SCNC: 134 MMOL/L (ref 136–145)
SODIUM SERPL-SCNC: 135 MMOL/L (ref 136–145)
SODIUM SERPL-SCNC: 136 MMOL/L (ref 136–145)
SODIUM SERPL-SCNC: 137 MMOL/L (ref 136–145)
T3 SERPL-MCNC: 1.74 NG/ML (ref 0.6–1.81)
T4 FREE SERPL-MCNC: 1.2 NG/DL (ref 0.78–1.46)
TROPONIN-HIGH SENSITIVITY: 6.1 PG/ML (ref 0–14)
TSH SERPL DL<=0.005 MIU/L-ACNC: 0.16 UIU/ML (ref 0.36–3.74)
TSH SERPL DL<=0.005 MIU/L-ACNC: 0.69 UIU/ML (ref 0.36–3.74)
TSH SERPL DL<=0.005 MIU/L-ACNC: 0.88 UIU/ML (ref 0.36–3.74)
TSH SERPL DL<=0.005 MIU/L-ACNC: 1.32 UIU/ML (ref 0.36–3.74)
VENTRICULAR RATE, ECG03: 73 BPM
VENTRICULAR RATE, ECG03: 79 BPM
WBC # BLD AUTO: 10 K/UL (ref 4.3–11.1)
WBC # BLD AUTO: 10.9 K/UL (ref 4.3–11.1)
WBC # BLD AUTO: 5.7 K/UL (ref 4.3–11.1)
WBC # BLD AUTO: 6.6 K/UL (ref 4.3–11.1)
WBC # BLD AUTO: 7 K/UL (ref 4.3–11.1)
WBC # BLD AUTO: 7.6 K/UL (ref 4.3–11.1)
WBC # BLD AUTO: 7.9 K/UL (ref 4.3–11.1)
WBC # BLD AUTO: 8.1 K/UL (ref 4.3–11.1)
WBC # BLD AUTO: 8.1 K/UL (ref 4.3–11.1)
WBC # BLD AUTO: 8.6 K/UL (ref 4.3–11.1)
WBC # BLD AUTO: 8.8 K/UL (ref 4.3–11.1)
WBC # BLD AUTO: 8.9 K/UL (ref 4.3–11.1)
WBC # BLD AUTO: 9.1 K/UL (ref 4.3–11.1)

## 2020-01-01 PROCEDURE — 74011250636 HC RX REV CODE- 250/636: Performed by: INTERNAL MEDICINE

## 2020-01-01 PROCEDURE — 74011250636 HC RX REV CODE- 250/636: Performed by: NURSE PRACTITIONER

## 2020-01-01 PROCEDURE — 96411 CHEMO IV PUSH ADDL DRUG: CPT

## 2020-01-01 PROCEDURE — 74011250636 HC RX REV CODE- 250/636: Performed by: NURSE ANESTHETIST, CERTIFIED REGISTERED

## 2020-01-01 PROCEDURE — 85025 COMPLETE CBC W/AUTO DIFF WBC: CPT

## 2020-01-01 PROCEDURE — 80053 COMPREHEN METABOLIC PANEL: CPT

## 2020-01-01 PROCEDURE — 77030008969: Performed by: INTERNAL MEDICINE

## 2020-01-01 PROCEDURE — 74011000250 HC RX REV CODE- 250: Performed by: STUDENT IN AN ORGANIZED HEALTH CARE EDUCATION/TRAINING PROGRAM

## 2020-01-01 PROCEDURE — 93005 ELECTROCARDIOGRAM TRACING: CPT | Performed by: STUDENT IN AN ORGANIZED HEALTH CARE EDUCATION/TRAINING PROGRAM

## 2020-01-01 PROCEDURE — 74011250636 HC RX REV CODE- 250/636: Performed by: STUDENT IN AN ORGANIZED HEALTH CARE EDUCATION/TRAINING PROGRAM

## 2020-01-01 PROCEDURE — 74011000258 HC RX REV CODE- 258: Performed by: EMERGENCY MEDICINE

## 2020-01-01 PROCEDURE — 74011000258 HC RX REV CODE- 258: Performed by: INTERNAL MEDICINE

## 2020-01-01 PROCEDURE — 77290 THER RAD SIMULAJ FIELD CPLX: CPT

## 2020-01-01 PROCEDURE — 74183 MRI ABD W/O CNTR FLWD CNTR: CPT

## 2020-01-01 PROCEDURE — 74011000258 HC RX REV CODE- 258

## 2020-01-01 PROCEDURE — 77293 RESPIRATOR MOTION MGMT SIMUL: CPT

## 2020-01-01 PROCEDURE — 36415 COLL VENOUS BLD VENIPUNCTURE: CPT

## 2020-01-01 PROCEDURE — 96417 CHEMO IV INFUS EACH ADDL SEQ: CPT

## 2020-01-01 PROCEDURE — 96375 TX/PRO/DX INJ NEW DRUG ADDON: CPT

## 2020-01-01 PROCEDURE — 96367 TX/PROPH/DG ADDL SEQ IV INF: CPT

## 2020-01-01 PROCEDURE — 36561 INSERT TUNNELED CV CATH: CPT

## 2020-01-01 PROCEDURE — 84480 ASSAY TRIIODOTHYRONINE (T3): CPT

## 2020-01-01 PROCEDURE — 71260 CT THORAX DX C+: CPT

## 2020-01-01 PROCEDURE — 88305 TISSUE EXAM BY PATHOLOGIST: CPT

## 2020-01-01 PROCEDURE — A9575 INJ GADOTERATE MEGLUMI 0.1ML: HCPCS | Performed by: INTERNAL MEDICINE

## 2020-01-01 PROCEDURE — 77030010507 HC ADH SKN DERMBND J&J -B

## 2020-01-01 PROCEDURE — 77001 FLUOROGUIDE FOR VEIN DEVICE: CPT

## 2020-01-01 PROCEDURE — 96374 THER/PROPH/DIAG INJ IV PUSH: CPT

## 2020-01-01 PROCEDURE — 77030021593 HC FCPS BIOP ENDOSC BSC -A: Performed by: INTERNAL MEDICINE

## 2020-01-01 PROCEDURE — A9575 INJ GADOTERATE MEGLUMI 0.1ML: HCPCS | Performed by: NURSE PRACTITIONER

## 2020-01-01 PROCEDURE — 84443 ASSAY THYROID STIM HORMONE: CPT

## 2020-01-01 PROCEDURE — 84439 ASSAY OF FREE THYROXINE: CPT

## 2020-01-01 PROCEDURE — 77301 RADIOTHERAPY DOSE PLAN IMRT: CPT

## 2020-01-01 PROCEDURE — 83735 ASSAY OF MAGNESIUM: CPT

## 2020-01-01 PROCEDURE — 77030038984 HC NDL ASPIR ULTRSND BSC -F: Performed by: INTERNAL MEDICINE

## 2020-01-01 PROCEDURE — 96413 CHEMO IV INFUSION 1 HR: CPT

## 2020-01-01 PROCEDURE — 82565 ASSAY OF CREATININE: CPT

## 2020-01-01 PROCEDURE — 76060000032 HC ANESTHESIA 0.5 TO 1 HR: Performed by: RADIOLOGY

## 2020-01-01 PROCEDURE — C1894 INTRO/SHEATH, NON-LASER: HCPCS

## 2020-01-01 PROCEDURE — 74011250636 HC RX REV CODE- 250/636: Performed by: PHYSICIAN ASSISTANT

## 2020-01-01 PROCEDURE — 74011000258 HC RX REV CODE- 258: Performed by: NURSE PRACTITIONER

## 2020-01-01 PROCEDURE — 77470 SPECIAL RADIATION TREATMENT: CPT

## 2020-01-01 PROCEDURE — 96372 THER/PROPH/DIAG INJ SC/IM: CPT

## 2020-01-01 PROCEDURE — 77300 RADIATION THERAPY DOSE PLAN: CPT

## 2020-01-01 PROCEDURE — 77334 RADIATION TREATMENT AID(S): CPT

## 2020-01-01 PROCEDURE — 74011000250 HC RX REV CODE- 250: Performed by: PHYSICIAN ASSISTANT

## 2020-01-01 PROCEDURE — 70553 MRI BRAIN STEM W/O & W/DYE: CPT

## 2020-01-01 PROCEDURE — 87040 BLOOD CULTURE FOR BACTERIA: CPT

## 2020-01-01 PROCEDURE — 21550 BIOPSY OF NECK/CHEST: CPT | Performed by: INTERNAL MEDICINE

## 2020-01-01 PROCEDURE — 88177 CYTP FNA EVAL EA ADDL: CPT

## 2020-01-01 PROCEDURE — 77338 DESIGN MLC DEVICE FOR IMRT: CPT

## 2020-01-01 PROCEDURE — 83880 ASSAY OF NATRIURETIC PEPTIDE: CPT

## 2020-01-01 PROCEDURE — 74011000636 HC RX REV CODE- 636: Performed by: INTERNAL MEDICINE

## 2020-01-01 PROCEDURE — 74011636320 HC RX REV CODE- 636/320: Performed by: EMERGENCY MEDICINE

## 2020-01-01 PROCEDURE — 83605 ASSAY OF LACTIC ACID: CPT

## 2020-01-01 PROCEDURE — 88172 CYTP DX EVAL FNA 1ST EA SITE: CPT

## 2020-01-01 PROCEDURE — 76040000026: Performed by: INTERNAL MEDICINE

## 2020-01-01 PROCEDURE — 74011250636 HC RX REV CODE- 250/636

## 2020-01-01 PROCEDURE — 31628 BRONCHOSCOPY/LUNG BX EACH: CPT | Performed by: INTERNAL MEDICINE

## 2020-01-01 PROCEDURE — 99211 OFF/OP EST MAY X REQ PHY/QHP: CPT

## 2020-01-01 PROCEDURE — 74177 CT ABD & PELVIS W/CONTRAST: CPT

## 2020-01-01 PROCEDURE — A9552 F18 FDG: HCPCS

## 2020-01-01 PROCEDURE — 88173 CYTOPATH EVAL FNA REPORT: CPT

## 2020-01-01 PROCEDURE — C9113 INJ PANTOPRAZOLE SODIUM, VIA: HCPCS | Performed by: STUDENT IN AN ORGANIZED HEALTH CARE EDUCATION/TRAINING PROGRAM

## 2020-01-01 PROCEDURE — 76060000032 HC ANESTHESIA 0.5 TO 1 HR: Performed by: INTERNAL MEDICINE

## 2020-01-01 PROCEDURE — C1788 PORT, INDWELLING, IMP: HCPCS

## 2020-01-01 PROCEDURE — 77030031131 HC SUT MXN P COVD -B

## 2020-01-01 PROCEDURE — 77030028690 HC NDL ASPIR ULTRSND BSC -E: Performed by: INTERNAL MEDICINE

## 2020-01-01 PROCEDURE — 99153 MOD SED SAME PHYS/QHP EA: CPT | Performed by: INTERNAL MEDICINE

## 2020-01-01 PROCEDURE — 74011250636 HC RX REV CODE- 250/636: Performed by: ANESTHESIOLOGY

## 2020-01-01 PROCEDURE — 74011636320 HC RX REV CODE- 636/320: Performed by: INTERNAL MEDICINE

## 2020-01-01 PROCEDURE — 74011000250 HC RX REV CODE- 250: Performed by: NURSE ANESTHETIST, CERTIFIED REGISTERED

## 2020-01-01 PROCEDURE — 99284 EMERGENCY DEPT VISIT MOD MDM: CPT

## 2020-01-01 PROCEDURE — 77030012699 HC VLV SUC CNTRL OCOA -A: Performed by: INTERNAL MEDICINE

## 2020-01-01 PROCEDURE — 71045 X-RAY EXAM CHEST 1 VIEW: CPT

## 2020-01-01 PROCEDURE — 76040000025: Performed by: INTERNAL MEDICINE

## 2020-01-01 PROCEDURE — 83690 ASSAY OF LIPASE: CPT

## 2020-01-01 PROCEDURE — 74011636320 HC RX REV CODE- 636/320

## 2020-01-01 PROCEDURE — 96360 HYDRATION IV INFUSION INIT: CPT

## 2020-01-01 PROCEDURE — 77030031139 HC SUT VCRL2 J&J -A

## 2020-01-01 PROCEDURE — 74011000250 HC RX REV CODE- 250

## 2020-01-01 PROCEDURE — 77295 3-D RADIOTHERAPY PLAN: CPT

## 2020-01-01 PROCEDURE — 84145 PROCALCITONIN (PCT): CPT

## 2020-01-01 PROCEDURE — 99285 EMERGENCY DEPT VISIT HI MDM: CPT

## 2020-01-01 PROCEDURE — 93005 ELECTROCARDIOGRAM TRACING: CPT | Performed by: EMERGENCY MEDICINE

## 2020-01-01 PROCEDURE — 77399 UNLISTED PX MED RADJ PHYSICS: CPT

## 2020-01-01 PROCEDURE — 84484 ASSAY OF TROPONIN QUANT: CPT

## 2020-01-01 PROCEDURE — 71046 X-RAY EXAM CHEST 2 VIEWS: CPT

## 2020-01-01 PROCEDURE — 94762 N-INVAS EAR/PLS OXIMTRY CONT: CPT

## 2020-01-01 PROCEDURE — 99283 EMERGENCY DEPT VISIT LOW MDM: CPT

## 2020-01-01 PROCEDURE — 81003 URINALYSIS AUTO W/O SCOPE: CPT

## 2020-01-01 PROCEDURE — 74011250636 HC RX REV CODE- 250/636: Performed by: EMERGENCY MEDICINE

## 2020-01-01 PROCEDURE — 99152 MOD SED SAME PHYS/QHP 5/>YRS: CPT | Performed by: INTERNAL MEDICINE

## 2020-01-01 RX ORDER — ONDANSETRON 2 MG/ML
8 INJECTION INTRAMUSCULAR; INTRAVENOUS ONCE
Status: COMPLETED | OUTPATIENT
Start: 2020-01-01 | End: 2020-01-01

## 2020-01-01 RX ORDER — SODIUM CHLORIDE 9 MG/ML
25 INJECTION, SOLUTION INTRAVENOUS CONTINUOUS
Status: ACTIVE | OUTPATIENT
Start: 2020-01-01 | End: 2020-01-01

## 2020-01-01 RX ORDER — DEXAMETHASONE SODIUM PHOSPHATE 4 MG/ML
8 INJECTION, SOLUTION INTRA-ARTICULAR; INTRALESIONAL; INTRAMUSCULAR; INTRAVENOUS; SOFT TISSUE ONCE
Status: COMPLETED | OUTPATIENT
Start: 2020-01-01 | End: 2020-01-01

## 2020-01-01 RX ORDER — OXYCODONE HYDROCHLORIDE 5 MG/1
10 TABLET ORAL
Status: CANCELLED | OUTPATIENT
Start: 2020-01-01 | End: 2020-01-01

## 2020-01-01 RX ORDER — SODIUM CHLORIDE, SODIUM LACTATE, POTASSIUM CHLORIDE, CALCIUM CHLORIDE 600; 310; 30; 20 MG/100ML; MG/100ML; MG/100ML; MG/100ML
100 INJECTION, SOLUTION INTRAVENOUS CONTINUOUS
Status: CANCELLED | OUTPATIENT
Start: 2020-01-01

## 2020-01-01 RX ORDER — EPINEPHRINE 1 MG/ML
0.3 INJECTION, SOLUTION, CONCENTRATE INTRAVENOUS AS NEEDED
Status: CANCELLED | OUTPATIENT
Start: 2020-01-01

## 2020-01-01 RX ORDER — PANTOPRAZOLE SODIUM 40 MG/1
40 TABLET, DELAYED RELEASE ORAL DAILY
Qty: 20 TAB | Refills: 0 | Status: SHIPPED | OUTPATIENT
Start: 2020-01-01 | End: 2020-01-01

## 2020-01-01 RX ORDER — FENTANYL CITRATE 50 UG/ML
50 INJECTION, SOLUTION INTRAMUSCULAR; INTRAVENOUS
Status: CANCELLED | OUTPATIENT
Start: 2020-01-01

## 2020-01-01 RX ORDER — SODIUM CHLORIDE 9 MG/ML
10 INJECTION INTRAMUSCULAR; INTRAVENOUS; SUBCUTANEOUS AS NEEDED
Status: CANCELLED | OUTPATIENT
Start: 2020-01-01

## 2020-01-01 RX ORDER — SODIUM CHLORIDE 0.9 % (FLUSH) 0.9 %
5-40 SYRINGE (ML) INJECTION AS NEEDED
Status: CANCELLED | OUTPATIENT
Start: 2020-01-01

## 2020-01-01 RX ORDER — SODIUM CHLORIDE 0.9 % (FLUSH) 0.9 %
10 SYRINGE (ML) INJECTION
Status: COMPLETED | OUTPATIENT
Start: 2020-01-01 | End: 2020-01-01

## 2020-01-01 RX ORDER — LIDOCAINE HYDROCHLORIDE 20 MG/ML
INJECTION, SOLUTION EPIDURAL; INFILTRATION; INTRACAUDAL; PERINEURAL AS NEEDED
Status: DISCONTINUED | OUTPATIENT
Start: 2020-01-01 | End: 2020-01-01 | Stop reason: HOSPADM

## 2020-01-01 RX ORDER — LIDOCAINE HYDROCHLORIDE 20 MG/ML
JELLY TOPICAL AS NEEDED
Status: DISCONTINUED | OUTPATIENT
Start: 2020-01-01 | End: 2020-01-01 | Stop reason: HOSPADM

## 2020-01-01 RX ORDER — SODIUM CHLORIDE 0.9 % (FLUSH) 0.9 %
5-40 SYRINGE (ML) INJECTION EVERY 8 HOURS
Status: CANCELLED | OUTPATIENT
Start: 2020-01-01

## 2020-01-01 RX ORDER — HYDROCORTISONE SODIUM SUCCINATE 100 MG/2ML
100 INJECTION, POWDER, FOR SOLUTION INTRAMUSCULAR; INTRAVENOUS AS NEEDED
Status: CANCELLED | OUTPATIENT
Start: 2020-01-01

## 2020-01-01 RX ORDER — CYANOCOBALAMIN 1000 UG/ML
1000 INJECTION, SOLUTION INTRAMUSCULAR; SUBCUTANEOUS ONCE
Status: COMPLETED | OUTPATIENT
Start: 2020-01-01 | End: 2020-01-01

## 2020-01-01 RX ORDER — LISINOPRIL 20 MG/1
20 TABLET ORAL DAILY
COMMUNITY
End: 2020-01-01 | Stop reason: SDUPTHER

## 2020-01-01 RX ORDER — MIDAZOLAM HYDROCHLORIDE 1 MG/ML
2 INJECTION, SOLUTION INTRAMUSCULAR; INTRAVENOUS
Status: CANCELLED | OUTPATIENT
Start: 2020-01-01 | End: 2020-01-01

## 2020-01-01 RX ORDER — LANOLIN ALCOHOL/MO/W.PET/CERES
3 CREAM (GRAM) TOPICAL
Qty: 30 TAB | Refills: 1 | Status: SHIPPED | OUTPATIENT
Start: 2020-01-01 | End: 2021-01-01 | Stop reason: ALTCHOICE

## 2020-01-01 RX ORDER — LIDOCAINE HYDROCHLORIDE 40 MG/ML
SOLUTION TOPICAL AS NEEDED
Status: CANCELLED | OUTPATIENT
Start: 2020-01-01

## 2020-01-01 RX ORDER — SODIUM CHLORIDE 0.9 % (FLUSH) 0.9 %
5-40 SYRINGE (ML) INJECTION EVERY 8 HOURS
Status: DISCONTINUED | OUTPATIENT
Start: 2020-01-01 | End: 2020-01-01 | Stop reason: HOSPADM

## 2020-01-01 RX ORDER — LIDOCAINE HYDROCHLORIDE 20 MG/ML
JELLY TOPICAL AS NEEDED
Status: CANCELLED | OUTPATIENT
Start: 2020-01-01

## 2020-01-01 RX ORDER — CEFAZOLIN SODIUM/WATER 2 G/20 ML
2 SYRINGE (ML) INTRAVENOUS ONCE
Status: COMPLETED | OUTPATIENT
Start: 2020-01-01 | End: 2020-01-01

## 2020-01-01 RX ORDER — SODIUM CHLORIDE 9 MG/ML
10 INJECTION INTRAMUSCULAR; INTRAVENOUS; SUBCUTANEOUS AS NEEDED
Status: ACTIVE | OUTPATIENT
Start: 2020-01-01 | End: 2020-01-01

## 2020-01-01 RX ORDER — SODIUM CHLORIDE 0.9 % (FLUSH) 0.9 %
10 SYRINGE (ML) INJECTION AS NEEDED
Status: ACTIVE | OUTPATIENT
Start: 2020-01-01 | End: 2020-01-01

## 2020-01-01 RX ORDER — FLUOXETINE HYDROCHLORIDE 40 MG/1
100 CAPSULE ORAL DAILY
COMMUNITY
End: 2020-01-01 | Stop reason: DRUGHIGH

## 2020-01-01 RX ORDER — SODIUM CHLORIDE 0.9 % (FLUSH) 0.9 %
10 SYRINGE (ML) INJECTION AS NEEDED
Status: DISCONTINUED | OUTPATIENT
Start: 2020-01-01 | End: 2020-01-01 | Stop reason: HOSPADM

## 2020-01-01 RX ORDER — ACETAMINOPHEN 325 MG/1
650 TABLET ORAL AS NEEDED
Status: CANCELLED
Start: 2020-01-01

## 2020-01-01 RX ORDER — DIPHENHYDRAMINE HYDROCHLORIDE 50 MG/ML
25 INJECTION, SOLUTION INTRAMUSCULAR; INTRAVENOUS AS NEEDED
Status: CANCELLED
Start: 2020-01-01

## 2020-01-01 RX ORDER — DIPHENHYDRAMINE HYDROCHLORIDE 50 MG/ML
12.5 INJECTION, SOLUTION INTRAMUSCULAR; INTRAVENOUS
Status: CANCELLED | OUTPATIENT
Start: 2020-01-01

## 2020-01-01 RX ORDER — HEPARIN SODIUM (PORCINE) LOCK FLUSH IV SOLN 100 UNIT/ML 100 UNIT/ML
500 SOLUTION INTRAVENOUS ONCE
Status: COMPLETED | OUTPATIENT
Start: 2020-01-01 | End: 2020-01-01

## 2020-01-01 RX ORDER — DIPHENHYDRAMINE HYDROCHLORIDE 50 MG/ML
50 INJECTION, SOLUTION INTRAMUSCULAR; INTRAVENOUS AS NEEDED
Status: CANCELLED
Start: 2020-01-01

## 2020-01-01 RX ORDER — SODIUM CHLORIDE, SODIUM LACTATE, POTASSIUM CHLORIDE, CALCIUM CHLORIDE 600; 310; 30; 20 MG/100ML; MG/100ML; MG/100ML; MG/100ML
100 INJECTION, SOLUTION INTRAVENOUS CONTINUOUS
Status: DISCONTINUED | OUTPATIENT
Start: 2020-01-01 | End: 2020-01-01 | Stop reason: HOSPADM

## 2020-01-01 RX ORDER — DIPHENHYDRAMINE HYDROCHLORIDE 50 MG/ML
50 INJECTION, SOLUTION INTRAMUSCULAR; INTRAVENOUS AS NEEDED
Status: DISPENSED | OUTPATIENT
Start: 2020-01-01 | End: 2020-01-01

## 2020-01-01 RX ORDER — FENTANYL CITRATE 50 UG/ML
100 INJECTION, SOLUTION INTRAMUSCULAR; INTRAVENOUS ONCE
Status: CANCELLED | OUTPATIENT
Start: 2020-01-01 | End: 2020-01-01

## 2020-01-01 RX ORDER — FLUMAZENIL 0.1 MG/ML
0.2 INJECTION INTRAVENOUS
Status: CANCELLED | OUTPATIENT
Start: 2020-01-01

## 2020-01-01 RX ORDER — HEPARIN 100 UNIT/ML
300-500 SYRINGE INTRAVENOUS AS NEEDED
Status: CANCELLED
Start: 2020-01-01

## 2020-01-01 RX ORDER — GADOTERATE MEGLUMINE 376.9 MG/ML
22 INJECTION INTRAVENOUS
Status: COMPLETED | OUTPATIENT
Start: 2020-01-01 | End: 2020-01-01

## 2020-01-01 RX ORDER — PROMETHAZINE HYDROCHLORIDE 25 MG/1
25 TABLET ORAL
Qty: 12 TAB | Refills: 0 | Status: SHIPPED | OUTPATIENT
Start: 2020-01-01 | End: 2020-01-01

## 2020-01-01 RX ORDER — PROPOFOL 10 MG/ML
INJECTION, EMULSION INTRAVENOUS
Status: DISCONTINUED | OUTPATIENT
Start: 2020-01-01 | End: 2020-01-01 | Stop reason: HOSPADM

## 2020-01-01 RX ORDER — CLONAZEPAM 1 MG/1
1 TABLET ORAL
Qty: 12 TAB | Refills: 0 | Status: SHIPPED | OUTPATIENT
Start: 2020-01-01 | End: 2020-01-01 | Stop reason: ALTCHOICE

## 2020-01-01 RX ORDER — CYANOCOBALAMIN 1000 UG/ML
1000 INJECTION, SOLUTION INTRAMUSCULAR; SUBCUTANEOUS
Status: CANCELLED | OUTPATIENT
Start: 2020-01-01

## 2020-01-01 RX ORDER — PROPOFOL 10 MG/ML
INJECTION, EMULSION INTRAVENOUS AS NEEDED
Status: DISCONTINUED | OUTPATIENT
Start: 2020-01-01 | End: 2020-01-01 | Stop reason: HOSPADM

## 2020-01-01 RX ORDER — ONDANSETRON 2 MG/ML
8 INJECTION INTRAMUSCULAR; INTRAVENOUS AS NEEDED
Status: CANCELLED | OUTPATIENT
Start: 2020-01-01

## 2020-01-01 RX ORDER — SODIUM CHLORIDE 9 MG/ML
200 INJECTION, SOLUTION INTRAVENOUS CONTINUOUS
Status: DISCONTINUED | OUTPATIENT
Start: 2020-01-01 | End: 2020-01-01 | Stop reason: HOSPADM

## 2020-01-01 RX ORDER — SODIUM CHLORIDE 0.9 % (FLUSH) 0.9 %
10 SYRINGE (ML) INJECTION AS NEEDED
Status: CANCELLED
Start: 2020-01-01

## 2020-01-01 RX ORDER — MIDAZOLAM HYDROCHLORIDE 1 MG/ML
2 INJECTION, SOLUTION INTRAMUSCULAR; INTRAVENOUS ONCE
Status: CANCELLED | OUTPATIENT
Start: 2020-01-01 | End: 2020-01-01

## 2020-01-01 RX ORDER — LIDOCAINE HYDROCHLORIDE 40 MG/ML
SOLUTION TOPICAL AS NEEDED
Status: DISCONTINUED | OUTPATIENT
Start: 2020-01-01 | End: 2020-01-01 | Stop reason: HOSPADM

## 2020-01-01 RX ORDER — ALBUTEROL SULFATE 0.83 MG/ML
2.5 SOLUTION RESPIRATORY (INHALATION) AS NEEDED
Status: CANCELLED
Start: 2020-01-01

## 2020-01-01 RX ORDER — HYDROMORPHONE HYDROCHLORIDE 2 MG/ML
0.5 INJECTION, SOLUTION INTRAMUSCULAR; INTRAVENOUS; SUBCUTANEOUS
Status: CANCELLED | OUTPATIENT
Start: 2020-01-01

## 2020-01-01 RX ORDER — ACETAMINOPHEN 500 MG
1000 TABLET ORAL ONCE
Status: CANCELLED | OUTPATIENT
Start: 2020-01-01 | End: 2020-01-01

## 2020-01-01 RX ORDER — GADOTERATE MEGLUMINE 376.9 MG/ML
23 INJECTION INTRAVENOUS
Status: COMPLETED | OUTPATIENT
Start: 2020-01-01 | End: 2020-01-01

## 2020-01-01 RX ORDER — SODIUM CHLORIDE 9 MG/ML
25 INJECTION, SOLUTION INTRAVENOUS CONTINUOUS
Status: DISCONTINUED | OUTPATIENT
Start: 2020-01-01 | End: 2020-01-01 | Stop reason: HOSPADM

## 2020-01-01 RX ORDER — SUCRALFATE 1 G/1
1 TABLET ORAL 4 TIMES DAILY
Qty: 60 TAB | Refills: 2 | Status: SHIPPED | OUTPATIENT
Start: 2020-01-01 | End: 2020-01-01

## 2020-01-01 RX ORDER — SODIUM CHLORIDE, SODIUM LACTATE, POTASSIUM CHLORIDE, CALCIUM CHLORIDE 600; 310; 30; 20 MG/100ML; MG/100ML; MG/100ML; MG/100ML
100 INJECTION, SOLUTION INTRAVENOUS CONTINUOUS
Status: CANCELLED | OUTPATIENT
Start: 2020-01-01 | End: 2020-01-01

## 2020-01-01 RX ORDER — FENTANYL CITRATE 50 UG/ML
50 INJECTION, SOLUTION INTRAMUSCULAR; INTRAVENOUS
Status: DISCONTINUED | OUTPATIENT
Start: 2020-01-01 | End: 2020-01-01 | Stop reason: HOSPADM

## 2020-01-01 RX ORDER — NALOXONE HYDROCHLORIDE 0.4 MG/ML
0.2 INJECTION, SOLUTION INTRAMUSCULAR; INTRAVENOUS; SUBCUTANEOUS AS NEEDED
Status: CANCELLED | OUTPATIENT
Start: 2020-01-01

## 2020-01-01 RX ORDER — HYDROCORTISONE SODIUM SUCCINATE 100 MG/2ML
100 INJECTION, POWDER, FOR SOLUTION INTRAMUSCULAR; INTRAVENOUS AS NEEDED
Status: DISPENSED | OUTPATIENT
Start: 2020-01-01 | End: 2020-01-01

## 2020-01-01 RX ORDER — MIDAZOLAM HYDROCHLORIDE 1 MG/ML
.25-5 INJECTION, SOLUTION INTRAMUSCULAR; INTRAVENOUS
Status: CANCELLED | OUTPATIENT
Start: 2020-01-01

## 2020-01-01 RX ORDER — SODIUM CHLORIDE 9 MG/ML
25 INJECTION, SOLUTION INTRAVENOUS CONTINUOUS
Status: CANCELLED | OUTPATIENT
Start: 2020-01-01

## 2020-01-01 RX ORDER — SODIUM CHLORIDE 0.9 % (FLUSH) 0.9 %
10-30 SYRINGE (ML) INJECTION AS NEEDED
Status: DISCONTINUED | OUTPATIENT
Start: 2020-01-01 | End: 2020-01-01 | Stop reason: HOSPADM

## 2020-01-01 RX ORDER — LIDOCAINE HYDROCHLORIDE 10 MG/ML
0.1 INJECTION INFILTRATION; PERINEURAL AS NEEDED
Status: CANCELLED | OUTPATIENT
Start: 2020-01-01

## 2020-01-01 RX ORDER — DIPHENHYDRAMINE HYDROCHLORIDE 50 MG/ML
12.5 INJECTION, SOLUTION INTRAMUSCULAR; INTRAVENOUS
Status: DISCONTINUED | OUTPATIENT
Start: 2020-01-01 | End: 2020-01-01 | Stop reason: HOSPADM

## 2020-01-01 RX ORDER — MIDAZOLAM HYDROCHLORIDE 1 MG/ML
.25-5 INJECTION, SOLUTION INTRAMUSCULAR; INTRAVENOUS
Status: DISCONTINUED | OUTPATIENT
Start: 2020-01-01 | End: 2020-01-01 | Stop reason: HOSPADM

## 2020-01-01 RX ORDER — SODIUM CHLORIDE 0.9 % (FLUSH) 0.9 %
5-40 SYRINGE (ML) INJECTION AS NEEDED
Status: DISCONTINUED | OUTPATIENT
Start: 2020-01-01 | End: 2020-01-01 | Stop reason: HOSPADM

## 2020-01-01 RX ORDER — SODIUM CHLORIDE, SODIUM LACTATE, POTASSIUM CHLORIDE, CALCIUM CHLORIDE 600; 310; 30; 20 MG/100ML; MG/100ML; MG/100ML; MG/100ML
INJECTION, SOLUTION INTRAVENOUS
Status: DISCONTINUED | OUTPATIENT
Start: 2020-01-01 | End: 2020-01-01 | Stop reason: HOSPADM

## 2020-01-01 RX ORDER — OXYCODONE HYDROCHLORIDE 5 MG/1
5 TABLET ORAL
Status: CANCELLED | OUTPATIENT
Start: 2020-01-01 | End: 2020-01-01

## 2020-01-01 RX ADMIN — GADOTERATE MEGLUMINE 22 ML: 376.9 INJECTION INTRAVENOUS at 10:35

## 2020-01-01 RX ADMIN — PROPOFOL 200 MCG/KG/MIN: 10 INJECTION, EMULSION INTRAVENOUS at 10:52

## 2020-01-01 RX ADMIN — SODIUM CHLORIDE 150 MG: 900 INJECTION, SOLUTION INTRAVENOUS at 09:16

## 2020-01-01 RX ADMIN — DEXAMETHASONE SODIUM PHOSPHATE 12 MG: 4 INJECTION, SOLUTION INTRAMUSCULAR; INTRAVENOUS at 08:37

## 2020-01-01 RX ADMIN — Medication 10 ML: at 20:25

## 2020-01-01 RX ADMIN — DIATRIZOATE MEGLUMINE AND DIATRIZOATE SODIUM 15 ML: 660; 100 LIQUID ORAL; RECTAL at 15:47

## 2020-01-01 RX ADMIN — MIDAZOLAM 1 MG: 1 INJECTION INTRAMUSCULAR; INTRAVENOUS at 08:49

## 2020-01-01 RX ADMIN — Medication 10 ML: at 09:10

## 2020-01-01 RX ADMIN — SODIUM CHLORIDE 200 MG: 900 INJECTION, SOLUTION INTRAVENOUS at 10:02

## 2020-01-01 RX ADMIN — SODIUM CHLORIDE 200 MG: 9 INJECTION, SOLUTION INTRAVENOUS at 10:38

## 2020-01-01 RX ADMIN — SODIUM CHLORIDE 100 ML: 900 INJECTION, SOLUTION INTRAVENOUS at 19:52

## 2020-01-01 RX ADMIN — DIATRIZOATE MEGLUMINE AND DIATRIZOATE SODIUM 10 ML: 660; 100 LIQUID ORAL; RECTAL at 08:20

## 2020-01-01 RX ADMIN — CYANOCOBALAMIN 1000 MCG: 1000 INJECTION, SOLUTION INTRAMUSCULAR; SUBCUTANEOUS at 16:08

## 2020-01-01 RX ADMIN — ONDANSETRON 8 MG: 2 INJECTION INTRAMUSCULAR; INTRAVENOUS at 09:00

## 2020-01-01 RX ADMIN — DEXAMETHASONE SODIUM PHOSPHATE 12 MG: 4 INJECTION, SOLUTION INTRAMUSCULAR; INTRAVENOUS at 09:01

## 2020-01-01 RX ADMIN — Medication 10 ML: at 08:20

## 2020-01-01 RX ADMIN — SODIUM CHLORIDE, SODIUM LACTATE, POTASSIUM CHLORIDE, AND CALCIUM CHLORIDE: 600; 310; 30; 20 INJECTION, SOLUTION INTRAVENOUS at 10:47

## 2020-01-01 RX ADMIN — SODIUM CHLORIDE 200 MG: 900 INJECTION, SOLUTION INTRAVENOUS at 08:25

## 2020-01-01 RX ADMIN — SODIUM CHLORIDE 100 ML: 900 INJECTION, SOLUTION INTRAVENOUS at 08:53

## 2020-01-01 RX ADMIN — SODIUM CHLORIDE 1100 MG: 9 INJECTION, SOLUTION INTRAVENOUS at 11:20

## 2020-01-01 RX ADMIN — SODIUM CHLORIDE 711 MG: 900 INJECTION, SOLUTION INTRAVENOUS at 10:36

## 2020-01-01 RX ADMIN — Medication 10 ML: at 11:36

## 2020-01-01 RX ADMIN — Medication 10 ML: at 15:48

## 2020-01-01 RX ADMIN — DEXAMETHASONE SODIUM PHOSPHATE 8 MG: 4 INJECTION, SOLUTION INTRAMUSCULAR; INTRAVENOUS at 09:50

## 2020-01-01 RX ADMIN — SODIUM CHLORIDE 25 ML/HR: 900 INJECTION, SOLUTION INTRAVENOUS at 09:08

## 2020-01-01 RX ADMIN — SODIUM CHLORIDE 150 MG: 900 INJECTION, SOLUTION INTRAVENOUS at 10:04

## 2020-01-01 RX ADMIN — IOPAMIDOL 100 ML: 755 INJECTION, SOLUTION INTRAVENOUS at 19:52

## 2020-01-01 RX ADMIN — SODIUM CHLORIDE 150 MG: 900 INJECTION, SOLUTION INTRAVENOUS at 08:52

## 2020-01-01 RX ADMIN — SODIUM CHLORIDE 25 ML/HR: 900 INJECTION, SOLUTION INTRAVENOUS at 09:45

## 2020-01-01 RX ADMIN — Medication 10 ML: at 08:55

## 2020-01-01 RX ADMIN — DEXAMETHASONE SODIUM PHOSPHATE 12 MG: 4 INJECTION, SOLUTION INTRAMUSCULAR; INTRAVENOUS at 09:40

## 2020-01-01 RX ADMIN — PROPOFOL 100 MG: 10 INJECTION, EMULSION INTRAVENOUS at 14:18

## 2020-01-01 RX ADMIN — Medication 10 ML: at 07:15

## 2020-01-01 RX ADMIN — PROMETHAZINE HYDROCHLORIDE 12.5 MG: 25 INJECTION INTRAMUSCULAR; INTRAVENOUS at 22:41

## 2020-01-01 RX ADMIN — IOPAMIDOL 100 ML: 755 INJECTION, SOLUTION INTRAVENOUS at 15:47

## 2020-01-01 RX ADMIN — SODIUM CHLORIDE 200 MG: 900 INJECTION, SOLUTION INTRAVENOUS at 08:05

## 2020-01-01 RX ADMIN — FENTANYL CITRATE 50 MCG: 50 INJECTION, SOLUTION INTRAMUSCULAR; INTRAVENOUS at 08:30

## 2020-01-01 RX ADMIN — DEXAMETHASONE SODIUM PHOSPHATE 12 MG: 4 INJECTION, SOLUTION INTRAMUSCULAR; INTRAVENOUS at 09:50

## 2020-01-01 RX ADMIN — DEXAMETHASONE SODIUM PHOSPHATE 8 MG: 4 INJECTION, SOLUTION INTRAMUSCULAR; INTRAVENOUS at 09:43

## 2020-01-01 RX ADMIN — ONDANSETRON 8 MG: 2 INJECTION INTRAMUSCULAR; INTRAVENOUS at 09:12

## 2020-01-01 RX ADMIN — HEPARIN SODIUM (PORCINE) LOCK FLUSH IV SOLN 100 UNIT/ML 500 UNITS: 100 SOLUTION at 11:15

## 2020-01-01 RX ADMIN — Medication 10 ML: at 11:40

## 2020-01-01 RX ADMIN — PROPOFOL 60 MG: 10 INJECTION, EMULSION INTRAVENOUS at 10:52

## 2020-01-01 RX ADMIN — SODIUM CHLORIDE 1100 MG: 900 INJECTION, SOLUTION INTRAVENOUS at 10:55

## 2020-01-01 RX ADMIN — GADOTERATE MEGLUMINE 22 ML: 376.9 INJECTION INTRAVENOUS at 19:38

## 2020-01-01 RX ADMIN — Medication 10 ML: at 10:38

## 2020-01-01 RX ADMIN — Medication 10 ML: at 08:45

## 2020-01-01 RX ADMIN — LIDOCAINE HYDROCHLORIDE: 40 SOLUTION TOPICAL at 08:49

## 2020-01-01 RX ADMIN — SODIUM CHLORIDE 1100 MG: 900 INJECTION, SOLUTION INTRAVENOUS at 10:58

## 2020-01-01 RX ADMIN — SODIUM CHLORIDE 25 ML/HR: 900 INJECTION, SOLUTION INTRAVENOUS at 09:25

## 2020-01-01 RX ADMIN — Medication 10 ML: at 11:50

## 2020-01-01 RX ADMIN — SODIUM CHLORIDE 100 ML: 900 INJECTION, SOLUTION INTRAVENOUS at 08:02

## 2020-01-01 RX ADMIN — PROPOFOL 250 MCG/KG/MIN: 10 INJECTION, EMULSION INTRAVENOUS at 14:18

## 2020-01-01 RX ADMIN — Medication 10 ML: at 09:18

## 2020-01-01 RX ADMIN — ONDANSETRON 8 MG: 2 INJECTION INTRAMUSCULAR; INTRAVENOUS at 09:38

## 2020-01-01 RX ADMIN — Medication 10 ML: at 09:08

## 2020-01-01 RX ADMIN — MIDAZOLAM 2 MG: 1 INJECTION INTRAMUSCULAR; INTRAVENOUS at 08:30

## 2020-01-01 RX ADMIN — Medication 2 G: at 10:56

## 2020-01-01 RX ADMIN — SODIUM CHLORIDE 200 MG: 9 INJECTION, SOLUTION INTRAVENOUS at 10:01

## 2020-01-01 RX ADMIN — ONDANSETRON 8 MG: 2 INJECTION INTRAMUSCULAR; INTRAVENOUS at 08:36

## 2020-01-01 RX ADMIN — SODIUM CHLORIDE 100 ML: 900 INJECTION, SOLUTION INTRAVENOUS at 10:35

## 2020-01-01 RX ADMIN — SODIUM CHLORIDE 25 ML/HR: 900 INJECTION, SOLUTION INTRAVENOUS at 09:10

## 2020-01-01 RX ADMIN — Medication 10 ML: at 08:53

## 2020-01-01 RX ADMIN — CYANOCOBALAMIN 1000 MCG: 1000 INJECTION, SOLUTION INTRAMUSCULAR at 11:23

## 2020-01-01 RX ADMIN — CYANOCOBALAMIN 1000 MCG: 1000 INJECTION, SOLUTION INTRAMUSCULAR; SUBCUTANEOUS at 09:50

## 2020-01-01 RX ADMIN — SODIUM CHLORIDE 1185 MG: 900 INJECTION, SOLUTION INTRAVENOUS at 09:55

## 2020-01-01 RX ADMIN — SODIUM CHLORIDE 150 MG: 900 INJECTION, SOLUTION INTRAVENOUS at 10:05

## 2020-01-01 RX ADMIN — SODIUM CHLORIDE 200 ML/HR: 900 INJECTION, SOLUTION INTRAVENOUS at 19:40

## 2020-01-01 RX ADMIN — LIDOCAINE HYDROCHLORIDE 120 MG: 10; .005 INJECTION, SOLUTION EPIDURAL; INFILTRATION; INTRACAUDAL; PERINEURAL at 11:06

## 2020-01-01 RX ADMIN — SODIUM CHLORIDE 25 ML/HR: 900 INJECTION, SOLUTION INTRAVENOUS at 07:30

## 2020-01-01 RX ADMIN — GADOTERATE MEGLUMINE 23 ML: 376.9 INJECTION INTRAVENOUS at 20:25

## 2020-01-01 RX ADMIN — FENTANYL CITRATE 50 MCG: 50 INJECTION, SOLUTION INTRAMUSCULAR; INTRAVENOUS at 08:34

## 2020-01-01 RX ADMIN — IOPAMIDOL 100 ML: 755 INJECTION, SOLUTION INTRAVENOUS at 08:02

## 2020-01-01 RX ADMIN — SODIUM CHLORIDE 1100 MG: 9 INJECTION, SOLUTION INTRAVENOUS at 11:26

## 2020-01-01 RX ADMIN — Medication 10 ML: at 19:39

## 2020-01-01 RX ADMIN — SODIUM CHLORIDE 25 ML/HR: 9 INJECTION, SOLUTION INTRAVENOUS at 08:40

## 2020-01-01 RX ADMIN — SODIUM CHLORIDE 40 MG: 9 INJECTION, SOLUTION INTRAMUSCULAR; INTRAVENOUS; SUBCUTANEOUS at 22:41

## 2020-01-01 RX ADMIN — Medication 10 ML: at 09:15

## 2020-01-01 RX ADMIN — LIDOCAINE HYDROCHLORIDE 60 MG: 20 INJECTION, SOLUTION EPIDURAL; INFILTRATION; INTRACAUDAL; PERINEURAL at 10:51

## 2020-01-01 RX ADMIN — SODIUM CHLORIDE 200 MG: 9 INJECTION, SOLUTION INTRAVENOUS at 10:55

## 2020-01-01 RX ADMIN — Medication 10 ML: at 12:47

## 2020-01-01 RX ADMIN — SODIUM CHLORIDE 100 ML: 900 INJECTION, SOLUTION INTRAVENOUS at 15:48

## 2020-01-01 RX ADMIN — SODIUM CHLORIDE 740 MG: 900 INJECTION, SOLUTION INTRAVENOUS at 11:02

## 2020-01-01 RX ADMIN — DEXAMETHASONE SODIUM PHOSPHATE 8 MG: 4 INJECTION, SOLUTION INTRAMUSCULAR; INTRAVENOUS at 10:35

## 2020-01-01 RX ADMIN — FENTANYL CITRATE 50 MCG: 50 INJECTION, SOLUTION INTRAMUSCULAR; INTRAVENOUS at 08:48

## 2020-01-01 RX ADMIN — SODIUM CHLORIDE 1100 MG: 9 INJECTION, SOLUTION INTRAVENOUS at 11:30

## 2020-01-01 RX ADMIN — CARBOPLATIN 742 MG: 10 INJECTION, SOLUTION INTRAVENOUS at 12:10

## 2020-01-01 RX ADMIN — CYANOCOBALAMIN 1000 MCG: 1000 INJECTION, SOLUTION INTRAMUSCULAR at 10:56

## 2020-01-01 RX ADMIN — Medication 10 ML: at 08:02

## 2020-01-01 RX ADMIN — FENTANYL CITRATE 50 MCG: 50 INJECTION, SOLUTION INTRAMUSCULAR; INTRAVENOUS at 08:56

## 2020-01-01 RX ADMIN — SODIUM CHLORIDE 1100 MG: 900 INJECTION, SOLUTION INTRAVENOUS at 10:30

## 2020-01-01 RX ADMIN — SODIUM CHLORIDE 200 MG: 900 INJECTION, SOLUTION INTRAVENOUS at 09:55

## 2020-01-01 RX ADMIN — CARBOPLATIN 686 MG: 10 INJECTION, SOLUTION INTRAVENOUS at 12:10

## 2020-01-01 RX ADMIN — Medication 10 ML: at 07:22

## 2020-01-01 RX ADMIN — DIATRIZOATE MEGLUMINE AND DIATRIZOATE SODIUM 10 ML: 660; 100 LIQUID ORAL; RECTAL at 11:50

## 2020-01-01 RX ADMIN — SODIUM CHLORIDE 25 ML/HR: 900 INJECTION, SOLUTION INTRAVENOUS at 08:30

## 2020-01-01 RX ADMIN — LIDOCAINE HYDROCHLORIDE: 20 JELLY TOPICAL at 08:49

## 2020-01-01 RX ADMIN — SODIUM CHLORIDE 25 ML/HR: 9 INJECTION, SOLUTION INTRAVENOUS at 08:45

## 2020-01-01 RX ADMIN — IOPAMIDOL 100 ML: 755 INJECTION, SOLUTION INTRAVENOUS at 08:53

## 2020-01-01 RX ADMIN — Medication 10 ML: at 11:33

## 2020-01-01 RX ADMIN — SODIUM CHLORIDE 25 ML/HR: 900 INJECTION, SOLUTION INTRAVENOUS at 09:18

## 2020-01-01 RX ADMIN — DEXAMETHASONE SODIUM PHOSPHATE 8 MG: 4 INJECTION, SOLUTION INTRAMUSCULAR; INTRAVENOUS at 10:54

## 2020-01-01 RX ADMIN — Medication 10 ML: at 19:52

## 2020-01-01 RX ADMIN — SODIUM CHLORIDE 200 MG: 900 INJECTION, SOLUTION INTRAVENOUS at 10:15

## 2020-01-01 RX ADMIN — SODIUM CHLORIDE 1185 MG: 900 INJECTION, SOLUTION INTRAVENOUS at 10:22

## 2020-01-01 RX ADMIN — Medication 10 ML: at 11:15

## 2020-01-01 RX ADMIN — SODIUM CHLORIDE, SODIUM LACTATE, POTASSIUM CHLORIDE, AND CALCIUM CHLORIDE 100 ML/HR: 600; 310; 30; 20 INJECTION, SOLUTION INTRAVENOUS at 13:48

## 2020-01-01 RX ADMIN — LIDOCAINE HYDROCHLORIDE 60 MG: 20 INJECTION, SOLUTION EPIDURAL; INFILTRATION; INTRACAUDAL; PERINEURAL at 14:18

## 2020-04-16 NOTE — ED NOTES
I have reviewed discharge instructions with the patient. The patient verbalized understanding. Patient left ED via Discharge Method: ambulatory to Home with self. Opportunity for questions and clarification provided. Patient given 1 scripts. To continue your aftercare when you leave the hospital, you may receive an automated call from our care team to check in on how you are doing. This is a free service and part of our promise to provide the best care and service to meet your aftercare needs.  If you have questions, or wish to unsubscribe from this service please call [phone]. Thank you for Choosing our [facility] Emergency Department.

## 2020-04-16 NOTE — DISCHARGE INSTRUCTIONS

## 2020-04-16 NOTE — ED TRIAGE NOTES
Pt states he was treated for pneumonia eight days ago and has finished antibiotics but still very SOB. States tested negative for covid-19 on 4-8-20.

## 2020-04-16 NOTE — ED PROVIDER NOTES
43-year-old male complaining of cough shortness of breath. This is been going on for over 2 weeks. Patient was seen at [facility] beginning of the month had a chest x-ray that showed right lower lobe infiltrate but he tested negative for COVID 19. He was placed on doxycycline and an inhaler. He is completed the course of antibiotics and steroids states that last few days he has felt like he is getting short of breath again has dyspnea on exertion. Shortness of Breath This is a recurrent problem. The problem occurs continuously. The current episode started more than 1 week ago. The problem has been gradually worsening. Associated symptoms include cough and sputum production. Pertinent negatives include no fever, no headaches, no rhinorrhea and no sore throat. It is unknown what precipitated the problem. He has tried beta-agonist inhalers for the symptoms. The treatment provided no relief. He has had prior hospitalizations. He has had no prior ED visits. Associated medical issues include pneumonia. Associated medical issues do not include chronic lung disease. Past Medical History:  
Diagnosis Date  Hypertension  Psychiatric disorder   
 depression Past Surgical History:  
Procedure Laterality Date  HX ORTHOPAEDIC    
 right knee History reviewed. No pertinent family history. Social History Socioeconomic History  Marital status: UNKNOWN Spouse name: Not on file  Number of children: Not on file  Years of education: Not on file  Highest education level: Not on file Occupational History  Not on file Social Needs  Financial resource strain: Not on file  Food insecurity Worry: Not on file Inability: Not on file  Transportation needs Medical: Not on file Non-medical: Not on file Tobacco Use  Smoking status: Former Smoker  Smokeless tobacco: Former User Quit date: 4/16/2005 Substance and Sexual Activity  Alcohol use: Yes Comment: socially  Drug use: Not on file  Sexual activity: Not on file Lifestyle  Physical activity Days per week: Not on file Minutes per session: Not on file  Stress: Not on file Relationships  Social connections Talks on phone: Not on file Gets together: Not on file Attends Sikh service: Not on file Active member of club or organization: Not on file Attends meetings of clubs or organizations: Not on file Relationship status: Not on file  Intimate partner violence Fear of current or ex partner: Not on file Emotionally abused: Not on file Physically abused: Not on file Forced sexual activity: Not on file Other Topics Concern  Not on file Social History Narrative  Not on file ALLERGIES: Patient has no known allergies. Review of Systems Constitutional: Negative. Negative for activity change and fever. HENT: Negative. Negative for rhinorrhea and sore throat. Eyes: Negative. Respiratory: Positive for cough, sputum production and shortness of breath. Cardiovascular: Negative. Gastrointestinal: Negative. Genitourinary: Negative. Musculoskeletal: Negative. Skin: Negative. Neurological: Negative. Negative for headaches. Psychiatric/Behavioral: Negative. All other systems reviewed and are negative. Vitals:  
 04/16/20 9717 BP: 177/79 Pulse: 67 Resp: 25 Temp: 97.6 °F (36.4 °C) SpO2: 94% Weight: 111.6 kg (246 lb) Height: 5' 11\" (1.803 m) Physical Exam 
Vitals signs and nursing note reviewed. Constitutional:   
   General: He is not in acute distress. Appearance: He is well-developed. He is not diaphoretic. HENT:  
   Head: Normocephalic and atraumatic. Right Ear: External ear normal.  
   Left Ear: External ear normal.  
   Nose: Nose normal.  
   Mouth/Throat:  
   Pharynx: No oropharyngeal exudate. Eyes: General: No scleral icterus. Right eye: No discharge. Left eye: No discharge. Conjunctiva/sclera: Conjunctivae normal.  
   Pupils: Pupils are equal, round, and reactive to light. Neck: Musculoskeletal: Normal range of motion and neck supple. Vascular: No JVD. Trachea: No tracheal deviation. Cardiovascular:  
   Rate and Rhythm: Normal rate and regular rhythm. Pulmonary:  
   Effort: Pulmonary effort is normal. No respiratory distress. Breath sounds: Normal breath sounds. No stridor. No wheezing. Chest:  
   Chest wall: No tenderness. Abdominal:  
   General: Bowel sounds are normal. There is no distension. Palpations: Abdomen is soft. There is no mass. Tenderness: There is no abdominal tenderness. Musculoskeletal: Normal range of motion. General: No tenderness. Skin: 
   General: Skin is warm and dry. Coloration: Skin is not pale. Findings: No erythema or rash. Neurological:  
   Mental Status: He is alert and oriented to person, place, and time. Cranial Nerves: No cranial nerve deficit. Psychiatric:     
   Behavior: Behavior normal.     
   Thought Content: Thought content normal.  
 
  
 
MDM Number of Diagnoses or Management Options Lung mass:  
SOB (shortness of breath):  
Diagnosis management comments: Patient's physical exam is normal while lying in the bed. No tachycardia or hypoxia noted. He is slightly tachypneic but back to normal after lying down. Discussed findings of CT with oncology Dr. Bridgette Good. They will see him this week for further work-up. Patient will probably need a biopsy by pulmonary. Dr. Bridgette Good said he will arrange that. Discussion with the patient answering his many questions as I could patient is to contact oncology today Amount and/or Complexity of Data Reviewed Clinical lab tests: ordered and reviewed Tests in the radiology section of CPT®: ordered and reviewed Tests in the medicine section of CPT®: ordered and reviewed Decide to obtain previous medical records or to obtain history from someone other than the patient: yes Risk of Complications, Morbidity, and/or Mortality Presenting problems: moderate Diagnostic procedures: high Management options: moderate Patient Progress Patient progress: stable Procedures

## 2020-04-16 NOTE — LETTER
[address] [address] EMERGENCY DEPT 
[address] [address] [facility] [address] [phone] Work/School Note Date: 4/16/2020 To Whom It May concern: 
 
Conor Cornea was seen and treated today in the emergency room by the following provider(s): 
Attending Provider: Carroll Coronado MD.   
 
Conor Cornea may return to work on 4/20/2020. Sincerely, Patrick Chavez RN

## 2020-04-17 PROBLEM — I10 ESSENTIAL HYPERTENSION: Status: ACTIVE | Noted: 2020-01-01

## 2020-04-17 PROBLEM — R91.8 MASS OF RIGHT LUNG: Status: ACTIVE | Noted: 2020-01-01

## 2020-04-17 NOTE — PROGRESS NOTES
Patient contacted regarding recent discharge and COVID-19 risk Care Transition Nurse/ Ambulatory Care Manager contacted the patient by telephone to perform post discharge assessment. Verified name and  with patient as identifiers. Patient has following risk factors of: immunocompromised. CTN/ACM reviewed discharge instructions, medical action plan and red flags related to discharge diagnosis. Reviewed and educated them on any new and changed medications related to discharge diagnosis. Advised obtaining a 90-day supply of all daily and as-needed medications. Education provided regarding infection prevention, and signs and symptoms of COVID-19 and when to seek medical attention with patient who verbalized understanding. Discussed exposure protocols and quarantine from [address] you at higher risk for severe illness  and given an opportunity for questions and concerns. The patient agrees to contact the COVID-19 hotline [phone] or PCP office for questions related to their healthcare. CTN/ACM provided contact information for future reference. From CDC: Are you at higher risk for severe illness?  Wash your hands often.  Avoid close contact (6 feet, which is about two arm lengths) with people who are sick.  Put distance between yourself and other people if COVID-19 is spreading in your community.  Clean and disinfect frequently touched surfaces.  Avoid all cruise travel and non-essential air travel.  Call your healthcare professional if you have concerns about COVID-19 and your underlying condition or if you are sick. For more information on steps you can take to protect yourself, see CDC's How to Protect Yourself Patient/family/caregiver given information for [employer] and agrees to enroll no Patient's preferred e-mail:   
Patient's preferred phone number:  
Based on Loop alert triggers, patient will be contacted by nurse care manager for worsening symptoms. Plan for follow-up call in 7-14 days based on severity of symptoms and risk factors.

## 2020-04-23 PROBLEM — Z87.891 HISTORY OF PRIOR CIGARETTE SMOKING: Status: ACTIVE | Noted: 2020-01-01

## 2020-04-23 PROBLEM — J43.9 EMPHYSEMA LUNG (HCC): Status: ACTIVE | Noted: 2020-01-01

## 2020-04-23 NOTE — H&P (VIEW-ONLY)
Consent: Viki Callaway, who was seen by synchronous (real-time) audio-video technology, and/or his healthcare decision maker, is aware that this patient-initiated, Telehealth encounter on 4/23/2020 is a billable service, with coverage as determined by his insurance carrier. He is aware that he may receive a bill and has provided verbal consent to proceed: Yes. Doxy. me. Assessment & Plan:  
Diagnoses and all orders for this visit: 
 
1. Mass of right lung Assessment & Plan: 
New finding of right hilar mass, adenopathy as well as left anterior subcutaneous nodule. Has plan for PET scan 4/28 and bronchoscopy/E bus 4/29. Explained procedure, sedation plan and answered all the patient's questions. He is not on any anticoagulation or antiplatelet medications. Patient is obese and represents some higher risk with sedated endoscopy. 2. Centrilobular emphysema ([address] Assessment & Plan: No history of known COPD, but should probably have spirometry when able given noted emphysema on CT scan. Key COPD Medications Patient is on no COPD/Asthma meds. Lab Results Component Value Date/Time WBC 8.1 04/16/2020 07:50 AM  
 HGB 12.4 04/16/2020 07:50 AM  
 HCT 37.9 04/16/2020 07:50 AM  
 PLATELET 777 16/49/3499 07:50 AM  
 
 
 
3. History of prior cigarette smoking Assessment & Plan: 
Quit smoking 15 years ago. New patient, new problem, needs additional work up, extensive chart review [facility] referral and oncology notes) and personally viewed images. [address] Subjective:  
Viki Callaway is a 62 y.o. male who was seen for Lung Mass Presents for new patient evaluation. He has history of hypertension and obesity. He was recently seen by his primary care provider for new shortness of breath.   He was treated for an acute bacterial pneumonia, but was sent to the ER for concern of PE and had a CT angiogram.  There is no evidence for a pulmonary embolus, but was found to have concerning findings for right-sided lung mass and resultant right middle lobe and right lower lobe partial atelectasis. He denies cough, fever or sputum production. He states that after completing his antibiotics his shortness of breath has also resolved. He reports that he did have some shortness of breath with ambulation and walking upstairs, but this is completely resolved. 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 He has no history of COPD or asthma, but did smoke for about 25 years at 1 to 1-1/2 packs/day, but quit 15 years ago. He reports that his maternal grandmother had lung cancer and his father had throat cancer. He has no history of cancer or any known cardiopulmonary disease. He otherwise feels well and denies any fevers, chills, night sweats weight loss. He does report that he could feel a knot in the left side of his chest, but this seems to have gotten smaller over the last week as well. Prior to Admission medications Medication Sig Start Date End Date Taking? Authorizing Provider  
lisinopriL (PRINIVIL, ZESTRIL) 20 mg tablet Take 20 mg by mouth daily. Yes Clarisse, MD Claire  
FLUoxetine (PROzac) 40 mg capsule Take 100 mg by mouth daily. Yes Clarisse, MD Claire  
clonazePAM (KlonoPIN) 1 mg tablet Take 1 Tab by mouth every eight (8) hours as needed for Anxiety. Max Daily Amount: 3 mg. 4/16/20  Yes Maximino Pa MD  
 
No Known Allergies Patient Active Problem List  
Diagnosis Code  Essential hypertension I10  
 Mass of right lung R91.8  Emphysema lung ([facility] [address] J43.9  History of prior cigarette smoking Z87.891 Review of Systems Constitutional: Negative for chills, fever and weight loss. HENT: Negative for hearing loss and nosebleeds. Eyes: Negative for blurred vision and double vision. Respiratory: Negative for cough, hemoptysis, sputum production and shortness of breath. Cardiovascular: Negative for chest pain and orthopnea. Gastrointestinal: Negative for heartburn, nausea and vomiting. Genitourinary: Negative for dysuria. Musculoskeletal: Negative for myalgias. Skin: Negative for rash. Neurological: Negative for dizziness and headaches. Endo/Heme/Allergies: Does not bruise/bleed easily. Psychiatric/Behavioral: Negative for depression and suicidal ideas. Objective:  
Vital Signs: (As obtained by patient/caregiver at home) There were no vitals taken for this visit. [INSTRUCTIONS:  \"[x]\" Indicates a positive item  \"[]\" Indicates a negative item  -- DELETE ALL ITEMS NOT EXAMINED] Constitutional: [x] Appears well-developed and well-nourished [x] No apparent distress   
  [] Abnormal - Mental status: [x] Alert and awake  [x] Oriented to person/place/time [x] Able to follow commands   
[] Abnormal - Eyes:   EOM    [x]  Normal    [] Abnormal -  
Sclera  [x]  Normal    [] Abnormal - 
        Discharge [x]  None visible   [] Abnormal - HENT: [x] Normocephalic, atraumatic  [] Abnormal -  
[x] Mouth/Throat: Mucous membranes are moist 
 
External Ears [x] Normal  [] Abnormal - Neck: [x] No visualized mass [] Abnormal - Pulmonary/Chest: [x] Respiratory effort normal   [x] No visualized signs of difficulty breathing or respiratory distress 
      [] Abnormal - Musculoskeletal:   [x] Normal gait with no signs of ataxia [x] Normal range of motion of neck [] Abnormal -  
 
Neurological:        [x] No Facial Asymmetry (Cranial nerve 7 motor function) (limited exam due to video visit) [x] No gaze palsy  
     [] Abnormal -   
     
Skin:        [x] No significant exanthematous lesions or discoloration noted on facial skin   
     [] Abnormal - Psychiatric:       [x] Normal Affect [] Abnormal -  
     [x] No Hallucinations Other pertinent observable physical exam findings:- 
 
 Imaging: left subq soft tissue nodule, R hilar mass/adenopathy and RLL atx vs infiltrate vs mass We discussed the expected course, resolution and complications of the diagnosis(es) in detail. Medication risks, benefits, costs, interactions, and alternatives were discussed as indicated. I advised him to contact the office if his condition worsens, changes or fails to improve as anticipated. He expressed understanding with the diagnosis(es) and plan. Elsie Wells is a 62 y.o. male being evaluated by a video visit encounter for concerns as above. A caregiver was present when appropriate. Due to this being a TeleHealth encounter ([facility]), evaluation of the following organ systems was limited: Vitals/Constitutional/EENT/Resp/CV/GI//MS/Neuro/Skin/Heme-Lymph-Imm. Pursuant to the emergency declaration under the [facility], [facility] waiver authority and the [employer] Act, this Virtual  Visit was conducted, with patient's (and/or legal guardian's) consent, to reduce the patient's risk of exposure to COVID-19 and provide necessary medical care. Services were provided through a video synchronous discussion virtually to substitute for in-person clinic visit. Patient and provider were located at their individual homes.  
 
Sophia Smith MD

## 2020-04-27 NOTE — PROGRESS NOTES
Patient contacted regarding COVID-19 risk and screening. Care Transition Nurse/ Ambulatory Care Manager contacted the patient by telephone to perform follow-up assessment. Verified name and  with patient as identifiers. Patient has following risk factors of: immunocompromised. Symptoms reviewed with patient who verbalized the following symptoms: shortness of breath. Due to no new or worsening symptoms encounter was not routed to provider for escalation. Education provided regarding infection prevention, and signs and symptoms of COVID-19 and when to seek medical attention with patient who verbalized understanding. Discussed exposure protocols and quarantine from [address] you at higher risk for severe illness  and given an opportunity for questions and concerns. The patient agrees to contact the COVID-19 hotline [phone] or PCP office for questions related to their healthcare. CTN/ACM provided contact information for future reference. From CDC: Are you at higher risk for severe illness?  Wash your hands often.  Avoid close contact (6 feet, which is about two arm lengths) with people who are sick.  Put distance between yourself and other people if COVID-19 is spreading in your community.  Clean and disinfect frequently touched surfaces.  Avoid all cruise travel and non-essential air travel.  Call your healthcare professional if you have concerns about COVID-19 and your underlying condition or if you are sick. For more information on steps you can take to protect yourself, see CDC's How to Protect Yourself Plan for follow-up call in 7-14 days based on severity of symptoms and risk factors.

## 2020-04-29 PROBLEM — R22.2 NODULE OF ANTERIOR CHEST WALL: Status: ACTIVE | Noted: 2020-01-01

## 2020-04-29 NOTE — PROCEDURES
PROCEDURE Biopsy of soft tissue lesion L chest 
Bronchoscopy with airway inspection TBBX. INDICATION  
 
 
 
 
 
EQUIPMENT: 
Olympus T 180 Bronchhoscope. Radial jaw forceps 18G needle 10cc sirynge ANESTHESIA Local anesthesia to chest wall with 5cc of 1% lidocaine. Concious sedation with: Fentanyl 200 mcg IV; [id number] IV; Lidocaine 180 mg to tracheo-bronchial tree and vocal cords. LEFT ANTERIOR CHEST WALL NODULE BIOPSY: 
 
After obtaining informed consent from the patient the L chest wall lesion was palpated and marked with a sharpie pen. Following this the chest wall was prepped with chlorhexidine in the usual fashion and draped. 1% lidocaine was the used to anesthetize the skin and soft tissue. After achieving adequate anesthesia an 18G needle with 10 cc syringe was used to biopsy the chest wall lesion using aspiration and jab method. Touch prep from second pass was positive for NONSMALL CELL CANCER. 3 additional samples were obtained and placed in toto in cytolyte for IHC. Overall 5 biopsies were obtained. post procedure chest US showed a positive lung slide indicating no PTX. Dressing was then applied after assuring hemostasis. EBL 0.5cc Complications: 
 
NONE. Final path pending Patient tolerated procedure well. Following chest wall biopsy an airway inspection was performed. AIRWAY INSPECTION After obtaining informed consent, using a bite block, an [id number] video bronchoscope was  introduced into the trachea through the vocal chords, without complication. RIGHT 
 
LOCATION NORM/ABNORM DESCRIPTION  
VOCAL CORDS NL   
TRACHEA NL   
DEISY NL   
RMSB NL   
RUL NL   
BI ABNL Distorted and rotated RML ABNL Extrinsically compressed, blind biopsy x 2 with touch prep on biopsy #1 POSITIVE FOR NON SMALL CELL CANCER  
SUP SEGM RLL NL   
MED BASAL NL   
ANTERIOR BASAL NL   
LATERAL BASAL NL   
POSTERIOR BASAL NL   
 
      
 
 
 
 
 
 
 
 
 
 
 
 
 
 LEFT 
 
LOCATION NORMAL/ABNORMAL TYPE  
LMSB NL   
ROC NL   
LINGULA NL   
SUPERIOR DIVISION NL   
SUPERIOR SEG LLL NL   
JUDAH-MEDIAL LLL NL   
LATERAL LLL NL   
POSTERIOR LLL NL The following samples were obtained: TBBX: 
RML X 2 Samples were sent for: 
Histology The procedure was completed  without complication and the patient tolerated the procedure well. EBL: 1 ml Recommendations: 
Present in tumor board, follow up with Dr Wilbert Alfaro. Case discussed with Dr Wilbert Alfaro. This is a [T4(cardiac/mesiastinal involvement),N2(level 4 and 7 PET positive), M1b(L chest wall metastasis)] stage IV lung cancer if lung origin confirmed.  
 
 
 
 
Asad Magallanes MD

## 2020-04-29 NOTE — DISCHARGE INSTRUCTIONS
RESPIRATORY CARE - BRONCHOSCOPY - DISCHARGE INSTRUCTIONS      You received a lot of numbing medication for your throat and nose, and you also received medication to make you sleepy during your procedure. Because of this and because the bronchoscopy may have irritated your airways, we ask that you follow these directions:    1. Do not eat or drink until  10:30 . After that, you may have what you please. You may want to try some liquids first, because your throat may be a little sore. 2. Medication may cause drowsiness for several hours, therefore:  · Do not drive or operate machinery for remainder of the day. · No alcohol today  · Do not make any important or legal decisions for 24 hours  · Do not sign any legal documents for 24 hours    3. You may cough up more mucus than usual and you may see some blood, but this is expected and should subside by the following day. 4. If severe throat irritation, coughing, or bleeding continue, call your doctor. 5.         If you run a fever greater than 102, call [facility] at [phone]. 6.         Dr. Rahel Alexander has asked that you:                A. Call the doctor's office at [phone] for questions or concerns in regard to your procedure today. B. Follow-up with oncology as scheduled. Pathology results will take around 1 week.      Discharge Medications: Resume medications          Instructions given to Arron Benoit and other family members

## 2020-04-29 NOTE — INTERVAL H&P NOTE
Date of Surgery Update: 
Elana Esquivel was seen and examined. History and physical has been reviewed. The patient has been examined.  There have been no significant clinical changes since the completion of the originally dated History and Physical. 
 
Signed By: Roxie Bhatt MD   
 April 29, 2020 8:24 AM   
  
 
 
 
 
Electronically signed by Roxie Bhatt MD on 4/29/2020 at 8:24 AM

## 2020-04-30 NOTE — PROGRESS NOTES
4/30/20 - Patient seen via VV today. Results of PET scan reviewed. Pathology not resulted yet but this appears to be a lung cancer. Dr. Saeed Valderrama would like area of pancreas in questions examined via MRI (scheduled for 5/1). Patient will likely undergo chemo/radiation and will have consult with radiation oncologist on 5/4. Port to be placed on 5/5 and pt will meet with Dr. Saeed Valderrama on 5/8 to discuss the schedule moving forward. Hycodan being sent in for ongoing cough. No other needs at this time, patient will call with any other issues. Sandie Spicer will speak to patient about disability forms.

## 2020-05-04 NOTE — NURSE NAVIGATOR
Consult lung cancer. Pet scan 4-28-20. MRI abdomen 5-1-20. F/u Dr. Aldrich Crew 5-8-20. Pt c/o fatigue. CONSENTS SIGNED FOR RT LUNG. CT SIM WILL BE SCHEDULED FOLLOWING EUS/PANCREATIC BIOPSY ORDERED BY DR. Ana Chairez. LUNG NAVIGATOR EMAILED. Idris Ross RN

## 2020-05-04 NOTE — PROGRESS NOTES
Patient resolved from Transition of Care episode on 05/04/2020  Patient/family has been provided the following resources and education related to COVID-19:                         Signs, symptoms and red flags related to COVID-19            CDC exposure and quarantine guidelines            Conduit exposure contact - [phone]            Contact for their local Department of Health                 Patient currently reports that the following symptoms have improved:  cough and shortness of breath. No further outreach scheduled with this CC. Episode of Care resolved. Patient has this CC contact information if future needs arise.

## 2020-05-05 NOTE — CONSULTS
[facility] 
CONSULTATION Name:  Nuno Olivier 
MR#:  [id number] :  1965 ACCOUNT #:  [de-identified] DATE OF SERVICE:  2020 RADIATION ONCOLOGY - ADE BHATTI MD 
 
REASON FOR CONSULTATION:  Newly diagnosed lung cancer, for assessment for radiation treatment. HISTORY OF PRESENT ILLNESS:  Mr. Ladonna De Paz is a 61-year-old white man, ex-smoker, stopped in , presented with dry cough and shortness of breath. For that, he underwent CT scan of the chest in April, which demonstrated an obstructive mass in the right lung. A PET scan was done, which showed right inferior hilar, right hilar, and mediastinal suspicious masses based on increased FDG uptake, also increased uptake in a 1.9 -cm left chest wall mass. In addition, there is a mass in the tail of the pancreas, also suspicious and recommendation is to further investigate it with MRI. MRI revealed a 2.5 cm mass in the tail of the pancreas and theimpression is either primary pancreatic cancer or metastatic pancreatic cancer. Dr. Doristine Sandhoff scheduled biopsy for this mass. I should mention that the patient was seen by Dr. Norma Hutchison and he underwent a bronchoscopy and biopsy of the lung mass as well as the left chest wall mass. The right lung mass biopsy showed non-small cell lung cancer. The left chest wall mass biopsy is pending. The patient has been doing well and when I saw him today, he really denied any significant symptoms. I neglected to mention that the patient was started on antibiotics with improvement of his breathing. PAST MEDICAL HISTORY:   
Past Medical History:  
Diagnosis Date  Cancer ([address] lung  Hypertension  Psychiatric disorder   
 depression The patient denies history of collagen vascular diseases, pacemaker insertion, prior radiation or prior chemotherapy. PAST SURGICAL HISTORY:  
Past Surgical History:  
Procedure Laterality Date  HX ORTHOPAEDIC    
 right knee MEDICATIONS:  
 
Current Outpatient Medications:  
  fluoxetine HCl (PROZAC WEEKLY PO), Take 100 mg by mouth daily. , Disp: , Rfl:  
  HYDROcodone-homatropine (HYCODAN) 5-1.5 mg/5 mL (5 mL) syrup, Take 5 mL by mouth four (4) times daily for 7 days. Max Daily Amount: 20 mL., Disp: 140 mL, Rfl: 0 
  LORazepam (ATIVAN) 0.5 mg tablet, Take 1 Tab by mouth two (2) times daily as needed for Anxiety for up to 30 days. Max Daily Amount: 1 mg., Disp: 30 Tab, Rfl: 1 
  lisinopriL (PRINIVIL, ZESTRIL) 20 mg tablet, Take 20 mg by mouth daily. , Disp: , Rfl:  
  clonazePAM (KlonoPIN) 1 mg tablet, Take 1 Tab by mouth every eight (8) hours as needed for Anxiety. Max Daily Amount: 3 mg., Disp: 12 Tab, Rfl: 0 ALLERGIES:  
No Known Allergies SOCIAL HISTORY:  
Social History Socioeconomic History  Marital status:  Spouse name: Not on file  Number of children: Not on file  Years of education: Not on file  Highest education level: Not on file Occupational History  Not on file Social Needs  Financial resource strain: Not on file  Food insecurity Worry: Not on file Inability: Not on file  Transportation needs Medical: Not on file Non-medical: Not on file Tobacco Use  Smoking status: Former Smoker Packs/day: 1.50 Years: 25.00 Pack years: 37.50 Last attempt to quit: 2005 Years since quitting: 15.3  Smokeless tobacco: Former User Quit date: 4/16/2005 Substance and Sexual Activity  Alcohol use: Yes Comment: socially  Drug use: Not Currently  Sexual activity: Not on file Lifestyle  Physical activity Days per week: Not on file Minutes per session: Not on file  Stress: Not on file Relationships  Social connections Talks on phone: Not on file Gets together: Not on file Attends Latter day service: Not on file Active member of club or organization: Not on file Attends meetings of clubs or organizations: Not on file Relationship status: Not on file  Intimate partner violence Fear of current or ex partner: Not on file Emotionally abused: Not on file Physically abused: Not on file Forced sexual activity: Not on file Other Topics Concern  Not on file Social History Narrative  Not on file FAMILY HISTORY:  
No family history on file. REVIEW OF SYSTEMS: Please see the completed review of systems sheet in the chart that I have reviewed today. PHYSICAL EXAMINATION:  
ECOG Performance status 1 
VITAL SIGNS:  
Visit Vitals /77 (BP 1 Location: Left arm, BP Patient Position: Sitting) Pulse 65 Temp 97.8 °F (36.6 °C) Wt 111.7 kg (246 lb 3.2 oz) SpO2 96% BMI 34.34 kg/m² LABORATORY:  
Lab Results Component Value Date/Time Sodium 135 (L) 04/16/2020 07:50 AM  
 Potassium 4.0 04/16/2020 07:50 AM  
 Chloride 103 04/16/2020 07:50 AM  
 CO2 29 04/16/2020 07:50 AM  
 Anion gap 3 (L) 04/16/2020 07:50 AM  
 Glucose 95 04/16/2020 07:50 AM  
 BUN 12 04/16/2020 07:50 AM  
 Creatinine 0.93 04/16/2020 07:50 AM  
 GFR est AA >60 04/16/2020 07:50 AM  
 GFR est non-AA >60 04/16/2020 07:50 AM  
 Calcium 9.0 04/16/2020 07:50 AM  
 Albumin 3.4 (L) 04/16/2020 07:50 AM  
 Protein, total 7.8 04/16/2020 07:50 AM  
 Globulin 4.4 (H) 04/16/2020 07:50 AM  
 A-G Ratio 0.8 (L) 04/16/2020 07:50 AM  
 AST (SGOT) 25 04/16/2020 07:50 AM  
 ALT (SGPT) 40 04/16/2020 07:50 AM  
 
Lab Results Component Value Date/Time WBC 8.1 04/16/2020 07:50 AM  
 HGB 12.4 (L) 04/16/2020 07:50 AM  
 HCT 37.9 (L) 04/16/2020 07:50 AM  
 PLATELET 421 14/53/6170 07:50 AM  
 
 
RADIOLOGY:   
Mri Abd W Wo Cont Result Date: 5/1/2020 EXAM: MRI ABDOMEN WITHOUT AND WITH IV CONTRAST Comparison: PET/CT dated 4/28/2020. Indication: Possible pancreatic tail mass.  Technique:  Multiplanar, multisequence MR imaging was performed of the abdomen prior to and following gadolinium contrast. 22 mL  Dotarem contrast material was administrated intravenously for the examination. This study was acquired following the IV administration of contrast material, given the patient's indications for the examination. If IV contrast material had not been administered, the likely of detecting abnormalities relevant to the patient's condition would have been substantially decreased. Three dimensional MRCP was performed using maximum intensity projection reconstruction. FINDINGS: Visualized Lungs: Normal. Liver: Normal in size and morphology. No focal lesions. Gallbladder/biliary: Normal gallbladder. No biliary dilatation. Pancreas: Previously seen lesion of the pancreatic tail is suboptimally evaluated because of motion artifact. Again demonstrated is a masslike lesion of the pancreatic tail measuring 2.5 x 1.9 cm with apparent mild enhancement. No pancreatic duct dilation or peripancreatic inflammatory changes. Spleen: Normal. Adrenals: Normal. Kidneys: No focal lesion or hydronephrosis. Gastrointestinal: Normal. Peritoneum/retroperitoneum: Normal. Lymph nodes: Normal. Bones/Soft tissues: Normal.  
 
IMPRESSION: 2.5 x 1.9 cm lesion of the pancreatic tail with apparent mild enhancement, which is indeterminate in the setting of a primary lung mass reflecting either metastasis or a primary pancreatic mass. Intrapancreatic splenule is unlikely given signal differences between the lesion and spleen. Endoscopic ultrasound may be useful in further evaluating. Ct Chest W Cont Result Date: 4/16/2020 CT OF THE CHEST WITH INTRAVENOUS CONTRAST, 4/16/2020 Indication: Recently treated for pneumonia with continued shortness of breath. Comparison: None Technique:   2.5 mm axial scans from above the aortic arch to the lung bases following the uneventful administration of 70 mL of Isovue-370. Intravenous contrast was given to evaluate for pulmonary embolism.  All CT scans performed at this facility use one or all of the following: Automated exposure control, adjustment of the mA and/or kVp according to patient's size, iterative reconstruction. Findings: The base of the neck is unremarkable in appearance. Prominent paratracheal mediastinal lymph nodes are seen. Additional soft tissue density seen at the right hilum which could represent additional lymph nodes although will be described later in this report. The thoracic aorta is normal in caliber. Opacification of the pulmonary arteries is adequate. However, assessment particularly of lower arterial structures is somewhat limited by patient breathing motion artifact. No defined arterial filling defect is seen to suggest pulmonary embolism. Linear hypodensities occur within central left lung vascular structures most evident on axial images 40, and 44. However, these correspond to central pulmonary veins and therefore not consistent with pulmonary emboli. Given that pulmonary venous thrombi would be very unusual, it is favor that these are artifactual. If these did represent thrombus, then the nonocclusive appearance with suggest more chronic thrombus. Evaluation with lung windows demonstrates abnormal changes of the right lung which are incompletely characterized. Previously, soft tissue density was seen at the right hilum which is now best appreciated on axial image 41 is appearance is most suggestive of adenopathy at this level. However, an additional soft tissue density is seen in the medial right middle lobe on axial image 48 and there is a collapsed appearance of the right middle lobe as well as partial collapse of the right lower lobe. There is abnormal \"cut off\" of the airway extending to the right middle lobe best appreciated on axial image 50. In addition, multiple central right infrahilar arterial structures appear attenuated.  Given these findings, the possibility of a central obstructing abnormality such as malignancy cannot be excluded. No pleural effusion is seen. Lungs are expanded without evidence for pneumothorax. No acute osseous abnormality is seen. A small soft tissue nodule is seen in the left anterior upper chest on axial image 17 measuring 1.7 cm in size which does not extend to the skin surface and therefore cannot be confirmed to represent a benign structure such as a sebaceous cyst..  Limited evaluation of the upper abdomen demonstrates abnormal nodular thickening of the body of the left adrenal gland best appreciated on axial image 114 suggesting a small adrenal lesion. IMPRESSION:  1. No evidence for pulmonary embolism. Changes are seen in left lung vascular structures which appear to involve pulmonary veins as described above. 2. Abnormal changes in the right lung which are incompletely characterized. However, there appears to be collapse of the right middle lobe and partial collapse of the right lower lobe with \"cut off\" of right middle lobe airways. The appearance is concerning for a central obstructing abnormality with a potential malignancy not excluded. Further evaluation as clinically indicated is recommended. 3. Prominent mediastinal, and right hilar lymph nodes, left adrenal nodularity, and soft tissue lesion in the left anterior upper chest which are incompletely characterized. If the patient is found to have a malignancy, then metastatic lesions cannot be excluded. If the patient is not found to have a pulmonary malignancy, then the lymph nodes may represent benign reactive changes. The left adrenal nodule and left anterior upper chest lesion would remain incompletely characterized. The left adrenal nodule would be best further assessed with an adrenal mass protocol CT scan. The left anterior upper chest lesion would be best initially assessed with clinical exam, and ultrasound. Pet/ct Tumor Image Skull Thigh W (ini) Result Date: 4/28/2020 PET/CT: 4/28/2020 INDICATION: Initial diagnosis of lung mass. TECHNIQUE: After oral administration of gastroview and intravenous administration of 17.32 mCi of F18 FDG, noncontrast CT images were obtained for attenuation correction and for fusion with emission PET images. A series of overlapping emission PET images were then obtained beginning 60 minutes after injection of FDG. The area imaged spanned the region from the skull base to the mid thighs. All CT scans performed at this facility use one or all of the following: Automated exposure control, adjustment of the mA and/or kVp according to patient's size, iterative reconstruction. COMPARISON: CT chest with contrast 4/16/2020 FINDINGS: NECK/CHEST: Only physiologic activity is seen in the neck. No adenopathy is suggested on limited CT images. Previously, abnormal changes were seen at the central right inferior hilum. Abnormal hypermetabolic activity is seen at this level demonstrating a maximum SUV of 11.2 g/mL furthermore raising concern for potential neoplasm. This corresponds to abnormal soft tissue density which overall measures 5.7 cm x 3.8 cm in greatest transverse dimension although this could include benign vascular structures or atelectatic lung on this noncontrast study. Additional increased activity is seen associated with multiple paratracheal and subcarinal mediastinal lymph nodes concerning for heather metastases. The largest lymph node is a low paratracheal lymph node seen on image 79 measuring 1.1 cm short axis. In addition to mediastinal adenopathy, there is a prominent right hilar lymph node seen on axial image 85 measuring 1.6 cm short axis which demonstrates mild activity concerning for an additional metastatic lymph node. Lastly, increased activity is seen associated with the superficial soft tissue mass in the left anterior upper chest seen on axial image 64 measuring 1.9 cm in size and demonstrating a maximum SUV of 10 g/mL.  The appearance is felt to be most concerning for a soft tissue metastasis. No aggressive osseous lesion is seen. ABDOMEN/PELVIS: Previously, abnormal nodularity was seen of the left adrenal gland. This is now seen on axial image 147 with thickening of the left adrenal body measuring up to 10 mm in width. Only mild PET activity is seen associated with this finding which is not clearly above background activity of the liver. Currently, this is favored to be benign and could represent a benign lesion such as an adenoma. Mild focal activity is seen in the most proximal pancreatic tail on axial PET/CT image 145. In retrospect, some questionable mild attenuation changes were seen at this level on the prior contrasted CT scan of the chest visualized on axial image 117. A pancreatic tail lesion is difficult to exclude given these findings. This should be further assessed. No focal hypermetabolic hepatic lesion is seen. No adenopathy is seen. Moderate atherosclerotic calcification is seen of the abdominal aorta. No aggressive osseous lesion is seen. Only physiologic activity seen in the pelvis. No adenopathy is seen. No aggressive osseous lesion is seen. IMPRESSION: 1. Abnormal PET activity associated with abnormal CT changes at the right inferior hilum raising concern for an obstructing central neoplasm of the right lung. Further evaluation as clinically indicated is recommended. 2. Increased activity within mildly prominent mediastinal and right hilar lymph nodes. In addition, increased activity is seen associated with a soft tissue mass in the left anterior upper chest wall measuring 1.9 cm in size concerning for metastatic lesions. No evidence for metastatic disease is otherwise seen. Thickening is seen in the body of the left adrenal gland without clear worrisome activity favored to represent benign changes as described above.  3. Focal activity in the most proximal pancreatic tail which demonstrates questionable attenuation changes on the prior contrasted CT scan of the chest. A proximal pancreatic lesion cannot be excluded. This would be best further assessed with a pancreatic mass protocol MRI or CT scan. If the patient cannot adequately hold his breath given the currently visualized lung changes, then CT would be favored given that this would be less prone to motion degradation. ECOG PERFORMANCE STATUS:  1. PHYSICAL EXAMINATION: 
GENERAL:  He looks well in no pain or distress. VITAL SIGNS:  Vitals are stable. NECK:  There is no cervical or supraclavicular node enlargement. CHEST:  Examination revealed decreased air entry bilaterally. ABDOMEN:  Examination revealed no organomegaly or any palpable masses. EXTREMITIES:  Upper and lower limb reveal no edema. NEUROLOGIC:  General neurological examination revealed the patient is oriented with good mental status. Motor power is good in the upper and lower limbs. Sensation, coordination, and deep tendon reflexes are not assessed. IMPRESSION:  Non-small cell lung cancer in the right mid lung with right infrahilar, hilar, and mediastinal disease. There is a left chest wall mass. Biopsy result of the left chest wall is pending. A 2.5-cm tail of pancreas mass, biopsy is being scheduled. RECOMMENDATION:  Mr. Maria R Newton has non-small cell lung cancer causing atelectasis of right middle lobe and most likely with metastases to left chest wall. Biopsy of tail of the pancreas is still pending. Case will be discussed with Dr. Bridgette Good for further management. (Upfront Systemic therapy, versus concurrent Chemoradiation). Again, I would like to thank you very much for letting me participate in the patient's care and I will keep you updated on him. Delmy Walters MD 
 
 
AA/[id number] 
D:  05/04/2020 19:06 
T:  05/04/2020 20:55 JOB #:  [id number]

## 2020-05-05 NOTE — ANESTHESIA POSTPROCEDURE EVALUATION
Procedure(s): PORT INSERT. total IV anesthesia Anesthesia Post Evaluation Multimodal analgesia: multimodal analgesia used between 6 hours prior to anesthesia start to PACU discharge Patient location during evaluation: PACU Patient participation: complete - patient participated Level of consciousness: awake and alert Pain management: adequate Airway patency: patent Anesthetic complications: no 
Cardiovascular status: acceptable and hemodynamically stable Respiratory status: acceptable Hydration status: acceptable Vitals Value Taken Time /63 5/5/2020 12:03 PM  
Temp Pulse 59 5/5/2020 11:58 AM  
Resp 16 5/5/2020 11:58 AM  
SpO2 95 % 5/5/2020 11:58 AM

## 2020-05-05 NOTE — ANESTHESIA PREPROCEDURE EVALUATION
Relevant Problems No relevant active problems Anesthetic History No history of anesthetic complications Review of Systems / Medical History Patient summary reviewed and pertinent labs reviewed Pulmonary Undiagnosed apnea and smoker Neuro/Psych Psychiatric history Cardiovascular Hypertension Exercise tolerance: >4 METS 
  
GI/Hepatic/Renal 
Within defined limits Endo/Other Obesity Other Findings Physical Exam 
 
Airway Mallampati: II 
TM Distance: 4 - 6 cm Neck ROM: normal range of motion Mouth opening: Normal 
 
 Cardiovascular Rhythm: regular Rate: normal 
 
 
 
 Dental 
 
Dentition: Full upper dentures Pulmonary Breath sounds clear to auscultation Abdominal 
 
 
 
 Other Findings Anesthetic Plan ASA: 3 Anesthesia type: total IV anesthesia Induction: Intravenous Anesthetic plan and risks discussed with: Patient

## 2020-05-05 NOTE — PROGRESS NOTES
Patient is Alert and discharge instructions given and received. Dr. Dennis Garcia states that patient can be released from anesthesia. Patient voided urine and is preparing for discharge.

## 2020-05-05 NOTE — DISCHARGE INSTRUCTIONS
[address]  Department of Interventional Radiology  [facility]  [phone] Office  [phone] Fax  Implanted Port Discharge Instructions      General Instructions:   A port is like an implanted IV. They are usually ordered for patients who will be getting chemotherapy, but can also be used as an IV for long term antibiotics, large amounts of fluids, and/or blood products. Your blood can be drawn from your port for labs also. Those patients who do not have good veins find the ports convenient as they can get the IV they need with one stick. The port can be used long term, and the care is easy. The device is under the skin, and once the skin heals, care is minimal. All that is required is the nurse who accesses the port will need to flush it with heparinized saline after each use. Ports are usually placed in the chest wall, usually on the right side. But they can be place in the arms and in the abdomen. Home Care Instructions: If your port is in your arm, do not allow blood pressure or other IVs to be place in that arm. Do not allow bra straps or any clothing to rub the skin over the port. Do not bathe or swim until the skin has healed and if the port is accessed. Once it is healed, and when the port is not accessed, it is okay to bathe and swim. Restrict yourself to light activity for the first 5 days after getting the port put in, after that, resume normal activity slowly. You may resume your normal diet and medications. Follow-Up Instructions: Please see your oncologist, or whatever physician ordered the port as he/she has requested of you. Call If: You should call your Physician and/or the Radiology Nurse if you notice redness, pus, swelling, or pain from the area of your incision. Call if you should develop a fever. The nurses who access your port will know to call your doctor if the port does not seem to be working properly. You need to tell the nurses who use the port if you should have any pain or swelling at the site during an infusion. To Reach Us:       Patient Signature:  Date: 5/5/2020  Discharging Nurse: Sarahi Stanford RN   If you have any questions about your procedure, please call the Interventional Radiology department at [phone]. After business hours (5pm) and weekends, call the answering service at [phone] and ask for the Radiologist on call to be paged. Interventional Radiology General Nurse Discharge    After general anesthesia or intravenous sedation, for 24 hours or while taking prescription Narcotics:  · Limit your activities  · Do not drive and operate hazardous machinery  · Do not make important personal or business decisions  · Do  not drink alcoholic beverages  · If you have not urinated within 8 hours after discharge, please contact your surgeon on call. * Please give a list of your current medications to your Primary Care Provider. * Please update this list whenever your medications are discontinued, doses are     changed, or new medications (including over-the-counter products) are added. * Please carry medication information at all times in case of emergency situations. These are general instructions for a healthy lifestyle:    No smoking/ No tobacco products/ Avoid exposure to second hand smoke  Surgeon General's Warning:  Quitting smoking now greatly reduces serious risk to your health. Obesity, smoking, and sedentary lifestyle greatly increases your risk for illness  A healthy diet, regular physical exercise & weight monitoring are important for maintaining a healthy lifestyle    You may be retaining fluid if you have a history of heart failure or if you experience any of the following symptoms:  Weight gain of 3 pounds or more overnight or 5 pounds in a week, increased swelling in our hands or feet or shortness of breath while lying flat in bed.   Please call your doctor as soon as you notice any of these symptoms; do not wait until your next office visit. Recognize signs and symptoms of STROKE:  F-face looks uneven    A-arms unable to move or move unevenly    S-speech slurred or non-existent    T-time-call 911 as soon as signs and symptoms begin-DO NOT go       Back to bed or wait to see if you get better-TIME IS BRAIN.

## 2020-05-05 NOTE — PROGRESS NOTES
Patient arrived to IR procedure room 2. Anesthesia personnel are here; please refer to anesthesia flow sheet.

## 2020-05-05 NOTE — PROCEDURES
Department of Interventional Radiology  [phone]        Interventional Radiology Brief Procedure Note    Patient: Matt Lima MRN: [id number]  SSN: [id number]    YOB: 1965  Age: 62 y.o.   Sex: male      Date of Procedure: 5/5/2020    Pre-Procedure Diagnosis: lung cancer    Post-Procedure Diagnosis: SAME    Procedure(s): Venous Chest Port Placement    Brief Description of Procedure: US, fluoro guided right IJ venous chest port placed    Performed By: Peggy Momin PA-C     Assistants: None    Anesthesia:TIVS/MAC    Estimated Blood Loss: Less than 10ml    Specimens:  None    Implants:  Chest Port Placement    Findings: catheter tip in right atrium     Complications: None    Recommendations: ok to use port     Follow Up: Dr. Saeed Valderrama    Signed By: Peggy Momin PA-C     May 5, 2020

## 2020-05-05 NOTE — H&P
Department of Interventional Radiology  [phone]    History and Physical    Patient:  Chad Guardado MRN:  [id number]  SSN:  [id number]    YOB: 1965  Age:  62 y.o. Sex:  male      Primary Care Provider:  None  Referring Physician:  Karri Gonzales MD    Subjective:     Chief Complaint: port    History of the Present Illness: The patient is a 62 y.o. male with lung cancer who presents for venous chest port placement. No acute complaints. NPO. Past Medical History:   Diagnosis Date    Cancer ([address]     lung    Hypertension     Psychiatric disorder     depression     Past Surgical History:   Procedure Laterality Date    HX ORTHOPAEDIC      right knee        Review of Systems:    Pertinent items are noted in the History of Present Illness. Prior to Admission medications    Medication Sig Start Date End Date Taking? Authorizing Provider   fluoxetine HCl (PROZAC WEEKLY PO) Take 100 mg by mouth daily. Yes Provider, Historical   HYDROcodone-homatropine (HYCODAN) 5-1.5 mg/5 mL (5 mL) syrup Take 5 mL by mouth four (4) times daily for 7 days. Max Daily Amount: 20 mL. 4/30/20 5/7/20 Yes Ayaka Koenig NP   LORazepam (ATIVAN) 0.5 mg tablet Take 1 Tab by mouth two (2) times daily as needed for Anxiety for up to 30 days. Max Daily Amount: 1 mg. 4/30/20 5/30/20 Yes Karri Gonzales MD   lisinopriL (PRINIVIL, ZESTRIL) 20 mg tablet Take 20 mg by mouth daily. Yes Claire Robb MD   clonazePAM (KlonoPIN) 1 mg tablet Take 1 Tab by mouth every eight (8) hours as needed for Anxiety. Max Daily Amount: 3 mg. 4/16/20  Yes Serina Tsai MD        No Known Allergies    No family history on file. Social History     Tobacco Use    Smoking status: Former Smoker     Packs/day: 1.50     Years: 25.00     Pack years: 37.50     Last attempt to quit: 2005     Years since quitting: 15.3    Smokeless tobacco: Former User     Quit date: 4/16/2005   Substance Use Topics    Alcohol use:  Yes Comment: socially        Objective:       Physical Examination:    Vitals:    05/05/20 0928   BP: 140/74   Pulse: 60   Resp: 20   Temp: 97.7 °F (36.5 °C)   SpO2: 97%       Pain Assessment  Pain Intensity 1: 0 (05/05/20 0924)               HEART: regular rate and rhythm  LUNG: clear to auscultation bilaterally  ABDOMEN: normal findings: soft, non-tender  EXTREMITIES: normal strength, tone, and muscle mass    Laboratory:     Lab Results   Component Value Date/Time    Sodium 135 (L) 04/16/2020 07:50 AM    Potassium 4.0 04/16/2020 07:50 AM    Chloride 103 04/16/2020 07:50 AM    CO2 29 04/16/2020 07:50 AM    Anion gap 3 (L) 04/16/2020 07:50 AM    Glucose 95 04/16/2020 07:50 AM    BUN 12 04/16/2020 07:50 AM    Creatinine 0.93 04/16/2020 07:50 AM    GFR est AA >60 04/16/2020 07:50 AM    GFR est non-AA >60 04/16/2020 07:50 AM    Calcium 9.0 04/16/2020 07:50 AM    Albumin 3.4 (L) 04/16/2020 07:50 AM    Protein, total 7.8 04/16/2020 07:50 AM    Globulin 4.4 (H) 04/16/2020 07:50 AM    A-G Ratio 0.8 (L) 04/16/2020 07:50 AM    AST (SGOT) 25 04/16/2020 07:50 AM    ALT (SGPT) 40 04/16/2020 07:50 AM     Lab Results   Component Value Date/Time    WBC 8.1 04/16/2020 07:50 AM    HGB 12.4 (L) 04/16/2020 07:50 AM    HCT 37.9 (L) 04/16/2020 07:50 AM    PLATELET 784 45/07/4088 07:50 AM     No results found for: APTT, PTP, INR, INREXT    Assessment:     Lung cancer    Hospital Problems  Date Reviewed: 4/30/2020    None          Plan:     Planned Procedure:  Port placement    Risks, benefits, and alternatives reviewed with patient and he agrees to proceed with the procedure.       Signed By: Jalyn Peoples PA-C     May 5, 2020

## 2020-05-08 NOTE — PROGRESS NOTES
I saw patient today with Dr Ki Jeffrey as new patient. Pt was scheduled for endoscopy for known pancreatic mass but was cancelled due to patient having Medicaid. Patient states he would desire to start treatment for lung cancer as soon as possible. Radiation/chemo will start 5-26 with radiation appt next week with planned CT SIM. We reviewed possible side effects of chemo/radiation and explained radiation will be given daily with weekly Carbo/taxol. Chemo ed/FC scheduled and Zofran/Compazine and Emla sent to patient's pharmacy. Patient is in agreement with plan. [facility] and office was closed. Spoke to patient and requested he call first thing Monday 5-11-20 and discuss possible payment plan and proceeding with endoscopy. Pt states he understands he has pancreatic area of concern and he is willing to start therapy for known lung cancer.

## 2020-05-13 NOTE — PROGRESS NOTES
I spoke with  at [facility] and she stated they will set up payment plan with patient although they will not technically cover Medicaid. . I spoke with Dr Anthony Watkins and he would like for pt to pursue this to find etiology of pancreatic mass.     Called patient this morning and left message for him to call me asap

## 2020-05-14 NOTE — PROGRESS NOTES
Spoke with Wilfredo Vaca at radiation and he states they will not be able to start radiation until the following week at the earliest. Pt states he may have EUS scheduled for 6-3-20 with [facility] and [facility] has agreed to take him as self pay payment plan. We will plan to start on 6-2-20 and work EUS in as needed.

## 2020-05-19 NOTE — PROGRESS NOTES
Because of the COVID-19 pandemic, I spoke with [facility] via the telephone regarding his self-pay status, potential oral medication authorizations, enrollment in the [address] [place] [address] ([facility]) and the [address] [place] [address] ([facility]), and assistance organization resource sheet. Next, I spoke with [facility] regarding his insurance questions. I answered questions about medical treatments and services. Pat Saldana stated he was awarded Full Medicaid and his SSI from [employer]. The patient stated he will begin receiving SSDI in November of 2020. I advised the patient to bring his Medicaid Card with him to his next visit at the [facility] so registration can scan the card into his chart. I did advise the patient when his SSDI begins in November of 2020; the patient may lose his Medicaid because of income guidelines of the program. The patient lives with his wife and the total income starting November of 2020 will be more than $1,700 per month. For a family of 2 in the [facility], this will exceed the [facility] Medicaid Income limits. Next, I spoke with [facility] about deductibles, copayments, and out of pocket maximums regarding Medicaid. I advised the patient to pick a Medicaid Managed Care Plan that was accepted by [address] [place] [address]. I stated to the patient [address] [place] [address] does not accept the [facility] Choice Medicaid Managed Care Plan. I emailed the patient a list of Medicaid Managed Care Plans. Next, I answered questions regarding the patients wife about Insurance Coverage. I have a phone appointment scheduled with Mrs. Cosme Castro to discuss the [employer] options on May 20, 2020. Next, I spoke with [facility] regarding the Limited Power of [employer] document. After reading the form over the telephone to [facility], the patient stated he will consider signing the [address] form at his next visit.   Next, I explained the difference in billing for the [address] [place] and [address] [place]. (which is [facility] Billing for the physicians - through [facility]). RAD ONC  Next, I spoke with Brittany Sanchez regarding the pharmacy that is located here on the [address], in our gift shop and available to patients. I also told Brittany Sanchez about the Radiation Oncology Scheduling /Nurse's line and informed him that if he needed any assistance in scheduling or rescheduling his appointments or had any issues regarding his radiation oncology appointments to give that number a call and Ms. Allen Chandler or Ms. Wing Marroquin will be glad to assist him. Next, I spoke with Brittany Sanchez regarding potential oral medication authorizations. I told him that if he ever had any problems getting his oral medications filled, to give the dedicated [facility]  a call. Most of the time, it is simply an authorization that needs to be done with the insurance company. Next, I spoke with Brittany Sanchez regarding enrolling with [facility] and [facility]. I went over some of the services that [facility] and [facility] offers and the enrollment process. Next, I spoke with Brittany Sanchez regarding information on flyers about the free Yoga classes for cancer survivors and the Oncology Massage program.  I let his know that she can get a free 30 minute massage. Lastly, I spoke with Brittany Sanchez regarding a form with various resource organizations that could assist with specific needs (example:  transportation, lodging, preparing meals, home cleaning)     I am scheduled to follow-up with Brittany Sanchez at his next visit. Brittany Sanchez expressed understanding of the information above and all questions were answered to his satisfaction.

## 2020-05-19 NOTE — PROGRESS NOTES
I spoke with patient today and he did not make CT SIM that was scheduled for him on 5-15 stating he thought this was cancelled. He was contacted by radiation and attempted reschedule on 5-21-20 but he will be on vacation and cannot make appt. He is now scheduled for CT SIM on 5-26-20. Pt states he is aware he has lung cancer and we are setting back start times but approx 2-3 weeks by delaying CT SIM. I told him Dr Saeed Valderrama did not think this was wise. Pt verbalizes understanding. We will reset time for 6-15 approx. Pt states [facility] has contacted him and will send him letter of notification to let him know when bx is scheduled. I reminded patient to have bx completed prior to 6-15-20 so Dr Saeed Valderrama could see results. Ana Barksdale,RN nurse navigator for Dr Saeed Valderrama to be made aware of new scheduling changes. Dr Saeed Valderrama is aware also.

## 2020-05-26 NOTE — NURSE NAVIGATOR
See/Sim lung cancer. Biopsy scheduled 6-3-20 per pt. Chemo start date 6-15-20.  
 
Iona Munoz RN

## 2020-05-27 NOTE — PROGRESS NOTES
I met with Kenji Magallanes regarding follow-up with our telephone financial counseling session. Kenji Magallanes signed the Limited Power of  form, the [address] and the [address] forms. I answered questions about insurance and billing services. Kenji Magallanes states he has Medicaid and has not received his card in the mail. I received as copy of the patients SSI benefit letter. Kenji Magallanes was given the financial counseling folder with my contact information and the insurance health benefits sheet. The folder also contained information regarding the benefits specialists contact information, cancer center resources, and informational flyers. Faxed Patient Referral form to the [address] at [phone]. Phone [phone]. Form scanned into chart. Faxed Physician's Statement to the [address] at [phone]. Phone [phone]. Form scanned into chart. Kenji Magallanes expressed understanding of the information above and all questions were answered to his satisfaction.

## 2020-05-27 NOTE — CONSULTS
[facility] 
CONSULTATION Name:  Mamadou Montague 
MR#:  [id number] :  1965 ACCOUNT #:  [de-identified] DATE OF SERVICE:  2020 ADDENDUM The patient missed a couple of appointments for simulation of his lung and finally, he came today for simulating his lung. I explained to him that based on discussion with Dr. Valdemar Liao, we will start treating his lung and the left chest wall nodule now. We will wait for the biopsy of the pancreatic lesion and we will decide based on the results of the pathology. I explained to him that we will be treating the lung lesion concurrently with chemotherapy. He will receive 6000 cGy in 30 treatments to the right lung lesion in the infrahilar, hilar, and the mediastinal area using IMRT RapidArc VMAT technique. For the left chest wall, I will treat him with 8 treatments, each delivers 750 cGy. This dose of radiation is sufficient to destroy this lesion completely. Early and the delayed side effects were explained to the patient in great details. Early side effects of lung cancer which the two main side effects are patient feels tired and develops dysphagia. Those are the two main side effects. Again, with proper planning, dysphagia, we will avoid as much of any dose of radiation to the esophagus. The patient was informed if he develops any of these side effects, he will get medical treatments. Late side effects which would include fibrosis of the site of the lung but unlikely to compromise his pulmonary function. Radiation pneumonitis is uncommon with careful treatment planning. The patient understands the benefits versus early and delayed side effects and agreed to the treatment. Again, I would like to thank u very much for letting me participate in his care and I will keep you updated on him. Meanwhile, we will be awaiting for the biopsy of the pancreatic lesion. The patient will be further assessed with the results of the biopsy. Allen Bobby MD 
 
 
[id number]/ 
D:  05/26/2020 18:07 
T:  05/26/2020 21:23 
JOB #:  [id number]

## 2020-06-03 NOTE — OP NOTES
Gastroenterology Procedure Note Procedure:  Esophagogastroduodenoscopy, Endoscopic ultrasound with Doppler and FNA Date of Procedure:  6/3/2020 Patient:  Darlene Cortez     1965 Indication:  Nausea, pancreatic lesion Sedation:  MAC Pre-Procedure Physical Exam: 
 
Mental status:  alert and oriented Airway:  normal oropharyngeal airway and neck mobility CV:  regular rate and rhythm Respiratory:  clear to auscultation Procedure: A History and Physical has been performed, and patient medication allergies have been reviewed. Risks of perforation, hemorrhage, adverse drug reaction, and aspiration were discussed. Informed consent was obtained for the procedure, including sedation. The patient was placed in the left lateral decubitus position. The heart rate, oxygen saturations, blood pressure, and response to care were monitored throughout the procedure. The gastroscope was passed through the mouth and advanced under direct vision to the second portion of the duodenum. A careful inspection was made as the gastroscope was withdrawn, including a retroflexed view of the proximal stomach. The patient tolerated the procedure well. The linear echoendoscope was passed through the mouth and advanced under direct vision to the distal duodenum. As the scope was slowly withdrawn, detailed endoscopic images were obtained from the surrounding organs. The patient tolerated the procedure well. Findings:  
 
ENDOSCOPIC FINDINGS:  
  
OROPHARYNX: Cords and pyriform recesses appear normal.  
ESOPHAGUS: The esophagus is normal. The proximal, mid, and distal portions are normal. The Z-Line is intact.   
STOMACH: The fundus on antegrade and retroflexed views is normal. The body, antrum, and pylorus are normal.  
DUODENUM: The bulb and second portions are normal. The ampulla is well-visualized endoscopically and appears normal. 
 
 PANCREAS:  The pancreas is well-visualized from head to tail. In the tail of the pancreas there is a 15 x 12 mm homogeneous, hypoechoic lesion. A second larger lesion is also seen measuring 22 x 14 mm. This is hypoechoic and heterogeneous with central calcification. FNA was performed of the larger mass using a 25-gauge [employer] needle. Two passes were made yielding core specimens. A single pass was made using a 19-gauge [employer] needle. Additional core specimens were obtained. There is no associated vascular involvement. The main pancreatic duct is of normal caliber with a smooth, regular course, measuring up to 3 mm in the head, 2 mm in the body and 1 mm in the tail. Pancreas divisum is not seen. BILIARY TREE: The gallbladder appears normal. The common bile duct is well-visualized from its insertion in the ampulla to the bifurcation of right and left hepatic ducts. It is non-dilated, measuring up to 7 mm maximally with a gradual taper down to the level of the ampulla. There are no intraductal stones, sludge or debris. The ampulla appears normal endosonographically. OTHER ORGANS:  Views of the left lobe of the liver demonstrate no solid mass lesions. There is no ascites in the upper abdomen. The left adrenal gland contains a 12 x 11 mm hypoechoic, homogeneous nodule. The major vascular structures including the portal vein, splenic vessels and celiac artery appear unremarkable. There are no pathologically enlarged posterior mediastinal or upper abdominal lymph nodes. Specimen:  Yes 
 
Estimated Blood Loss:  Minimal 
 
Implant:  None Impression: 1. Normal EGD. 2. Pancreatic tail masses. These are most suspicious for metastatic disease but alternatively may represent primary pancreatic adenocarcinoma, lymphoma or benign lymph nodes. FNA of the larger lesion was performed. 3. Left adrenal mass. This may represent a metastasis or an adenoma. Plan: 1. Follow up results of pathology. 2. Avoid NSAIDs for 48 hours. 3. Further recommendations will be based on pathology results and patient's clinical course.   
 
Signed: 
Andres Loredo MD 
6/3/2020 
2:43 PM

## 2020-06-03 NOTE — H&P
History and Physical for Endoscopic Ultrasound Date: 6/3/2020 History of Present Illness:  Patient presents to undergo endoscopic ultrasound for evaluation of a pancreatic lesion. Past Medical History:  
Diagnosis Date  Cancer ([address] lung  Hypertension   
 managed with meds  Psychiatric disorder   
 depression Past Surgical History:  
Procedure Laterality Date  HX ORTHOPAEDIC    
 right knee  IR INSERT TUNL CVC W PORT OVER 5 YEARS  5/5/2020 No family history on file. Social History Tobacco Use  Smoking status: Former Smoker Packs/day: 1.50 Years: 25.00 Pack years: 37.50 Last attempt to quit: 2005 Years since quitting: 15.4  Smokeless tobacco: Former User Quit date: 4/16/2005 Substance Use Topics  Alcohol use: Yes Comment: socially No Known Allergies No current facility-administered medications for this encounter. Current Outpatient Medications Medication Sig  
 lisinopriL (PRINIVIL, ZESTRIL) 20 mg tablet Take 1 Tab by mouth daily. Indications: high blood pressure  sertraline (ZOLOFT) 100 mg tablet Take 1 Tab by mouth daily. Indications: anxiousness associated with depression  prochlorperazine (Compazine) 10 mg tablet Take 1 Tab by mouth every six (6) hours as needed for Nausea or Vomiting for up to 30 days.  lidocaine-prilocaine (EMLA) topical cream Apply  to affected area as needed for Pain. Apply 45 min to port site prior to needle stick  ondansetron (ZOFRAN ODT) 8 mg disintegrating tablet Take 1 Tab by mouth every eight (8) hours as needed for Nausea or Vomiting. Indications: prevent nausea and vomiting from cancer chemotherapy  clonazePAM (KlonoPIN) 1 mg tablet Take 1 Tab by mouth every eight (8) hours as needed for Anxiety. Max Daily Amount: 3 mg. Review of Systems: A detailed 10 organ review of systems is obtained with pertinent positives as listed in the History of Present Illness. All others are negative. Objective:  
 
Physical Exam: 
There were no vitals taken for this visit. General:  Alert and oriented. Heart: Regular rate and rhythm Lungs:  Clear to auscultation bilaterally Abdomen: Soft, nontender, nondistended Impression/Plan:  
 
Proceed with EUS as planned. I have discussed with the patient the technique, benefits, alternatives, and risks of the procedure, including medication reaction, immediate or delayed bleeding, or perforation of the gastrointestinal tract. Signed By: Zulema Kong MD   
 Carla 3, 2020

## 2020-06-03 NOTE — ANESTHESIA POSTPROCEDURE EVALUATION
Procedure(s): ENDOSCOPIC ULTRASOUND (EUS)/BMI 39 ESOPHAGOGASTRODUODENOSCOPY (EGD) ESOPHAGOGASTRODUODENAL (EGD) BIOPSY. total IV anesthesia Anesthesia Post Evaluation Multimodal analgesia: multimodal analgesia used between 6 hours prior to anesthesia start to PACU discharge Patient location during evaluation: PACU Patient participation: complete - patient participated Level of consciousness: awake Pain management: adequate Airway patency: patent Anesthetic complications: no 
Cardiovascular status: acceptable and hemodynamically stable Respiratory status: acceptable Hydration status: acceptable Comments: Acceptable for discharge from PACU. Post anesthesia nausea and vomiting:  none INITIAL Post-op Vital signs:  
Vitals Value Taken Time /90 6/3/2020  3:01 PM  
Temp 36.7 °C (98 °F) 6/3/2020  2:48 PM  
Pulse 74 6/3/2020  3:01 PM  
Resp 14 6/3/2020  2:48 PM  
SpO2 97 % 6/3/2020  3:01 PM  
Vitals shown include unvalidated device data.

## 2020-06-03 NOTE — ANESTHESIA PREPROCEDURE EVALUATION
Relevant Problems No relevant active problems Anesthetic History Review of Systems / Medical History Patient summary reviewed and pertinent labs reviewed Pulmonary COPD Smoker Comments: Lung cancer, right Neuro/Psych Within defined limits Cardiovascular Hypertension: well controlled GI/Hepatic/Renal 
Within defined limits Endo/Other Within defined limits Other Findings Physical Exam 
 
Airway Mallampati: II 
TM Distance: 4 - 6 cm Neck ROM: normal range of motion Mouth opening: Normal 
 
 Cardiovascular Rhythm: regular Dental 
No notable dental hx Pulmonary Breath sounds clear to auscultation Abdominal 
 
 
 
 Other Findings Anesthetic Plan ASA: 3 Anesthesia type: total IV anesthesia Induction: Intravenous Anesthetic plan and risks discussed with: Patient

## 2020-06-03 NOTE — DISCHARGE INSTRUCTIONS
Gastrointestinal Esophagogastroduodenoscopy (EGD)/ Endoscopic Ultrasound(EUS)- Upper Exam Discharge Instructions    1. Call Dr. Supriya Irizarry  for any problems or questions. 2. Contact the doctor's office for follow up appointment as directed. 3. Medication may cause drowsiness for several hours, therefore, do not drive or operate machinery for remainder of the day. 4. No alcohol today. 5. Do not make any important decisions such as signing legal paperwork. 6. Ordinarily, you may resume regular diet and activity after exam unless otherwise specified by your physician. 7. For mild soreness in your throat you may use Cepacol throat lozenges or warm  salt-water gargles as needed. Any additional instructions:     1. Follow up results of pathology. 2. Avoid NSAIDs for 48 hours. Instructions given to Cleve Lam and other family members.

## 2020-06-03 NOTE — ROUTINE PROCESS
VSS. Discharge instructions reviewed with patient and Carolynn Thompson, daughter and copy of instructions sent home with patient. Dr. Erasot Waldrop spoke with patient and daughter prior to discharge. Questions answered. Discharged via wheelchair, wheeled out by Felecia Sharp, [address] [place] [address]. IV discontinued prior to discharge. Personal items with patient at discharge: clothes

## 2020-06-09 NOTE — PROGRESS NOTES
Spoke with Dr Jason Lima concerning new path of pancreas. Dr Jason Lima stated we will pursue same treatment as originally planned for now.

## 2020-06-12 PROBLEM — C34.82 MALIGNANT NEOPLASM OF OVERLAPPING SITES OF LEFT LUNG (HCC): Status: ACTIVE | Noted: 2020-01-01

## 2020-06-12 NOTE — PROGRESS NOTES
6/12/2020 Saw the patient with Dr Hipolito Costello. The final decision after case was presented at tumor board was this is lung cancer and will be treated up front with chemotherapy. There may be a role for radiation after chemo is complete. The patient does have a port placed. He will receive B12 today and start treatment next week. The next available brain MRI is 6/23. Navigation will continue to follow.

## 2020-06-12 NOTE — PROGRESS NOTES
Arrived to the [facility]. Vit B12 given IM in left arm. Patient tolerated well. Any issues or concerns during appointment: none. Patient aware of next infusion appointment on 6-18-20 (date) at 12 (time). Discharged via ambulatory.

## 2020-06-16 NOTE — ED PROVIDER NOTES
57-year-old gentleman states history of hypertension and lung cancer was diagnosed recently. He has had some trouble with shortness of breath in the past.  However the last 24 hours he started up again with shortness of breath and a lot of dyspnea on exertion. Cough with yellow sputum but no blood. Slight wheezing and tried an inhaler. No fever chills. Not coughing up any blood. Denies any abdominal pain. No history of leg pain or swelling. He has had a couple episodes of vomiting last 24 hours as well. Started up with a left-sided pleuritic chest pain in the last hour. No unusual bleeding or syncope. Describes dyspnea on exertion more than orthopnea. Patient denies any history of DVT or PE. No cardiac issues that he knows of. 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 Patient scheduled for first chemotherapy in 2 days. The history is provided by the patient. Shortness of Breath This is a new problem. The current episode started yesterday. The problem has been gradually worsening. Associated symptoms include cough, sputum production, wheezing, chest pain and vomiting. Pertinent negatives include no fever, no headaches, no neck pain, no hemoptysis, no syncope, no abdominal pain, no rash, no leg pain and no leg swelling. He has tried nothing for the symptoms. Associated medical issues include heart failure. Associated medical issues do not include COPD, pneumonia, PE, DVT or recent surgery. Past Medical History:  
Diagnosis Date  Cancer ([facility] [address] lung  Hypertension   
 managed with meds  Psychiatric disorder   
 depression Past Surgical History:  
Procedure Laterality Date  HX ORTHOPAEDIC    
 right knee  IR INSERT TUNL CVC W PORT OVER 5 YEARS  5/5/2020 History reviewed. No pertinent family history. Social History Socioeconomic History  Marital status:   
 Spouse name: Not on file  Number of children: Not on file  Years of education: Not on file  Highest education level: Not on file Occupational History  Not on file Social Needs  Financial resource strain: Not on file  Food insecurity Worry: Not on file Inability: Not on file  Transportation needs Medical: Not on file Non-medical: Not on file Tobacco Use  Smoking status: Former Smoker Packs/day: 1.50 Years: 25.00 Pack years: 37.50 Last attempt to quit: 2005 Years since quitting: 15.4  Smokeless tobacco: Former User Quit date: 4/16/2005 Substance and Sexual Activity  Alcohol use: Yes Comment: socially  Drug use: Not Currently  Sexual activity: Not on file Lifestyle  Physical activity Days per week: Not on file Minutes per session: Not on file  Stress: Not on file Relationships  Social connections Talks on phone: Not on file Gets together: Not on file Attends [employer] witness service: Not on file Active member of club or organization: Not on file Attends meetings of clubs or organizations: Not on file Relationship status: Not on file  Intimate partner violence Fear of current or ex partner: Not on file Emotionally abused: Not on file Physically abused: Not on file Forced sexual activity: Not on file Other Topics Concern  Not on file Social History Narrative  Not on file ALLERGIES: Patient has no known allergies. Review of Systems Constitutional: Positive for fatigue. Negative for chills and fever. Respiratory: Positive for cough, sputum production, shortness of breath and wheezing. Negative for hemoptysis. Cardiovascular: Positive for chest pain. Negative for palpitations, leg swelling and syncope. Gastrointestinal: Positive for vomiting. Negative for abdominal pain and diarrhea. Genitourinary: Negative for dysuria and flank pain. Musculoskeletal: Negative for back pain and neck pain. Skin: Negative for color change and rash. Neurological: Negative for syncope and headaches. All other systems reviewed and are negative. Vitals:  
 06/16/20 1801 BP: 137/60 Pulse: 79 Resp: 22 Temp: 98.1 °F (36.7 °C) SpO2: 96% Weight: 109.8 kg (242 lb) Height: 5' 11\" (1.803 m) Physical Exam 
Vitals signs and nursing note reviewed. Constitutional:   
   General: He is not in acute distress. Appearance: He is well-developed. HENT:  
   Head: Normocephalic and atraumatic. Right Ear: External ear normal.  
   Left Ear: External ear normal.  
   Mouth/Throat:  
   Pharynx: No oropharyngeal exudate. Eyes:  
   Conjunctiva/sclera: Conjunctivae normal.  
   Pupils: Pupils are equal, round, and reactive to light. Neck: Musculoskeletal: Normal range of motion and neck supple. Cardiovascular:  
   Rate and Rhythm: Normal rate and regular rhythm. Heart sounds: No murmur. Pulmonary:  
   Effort: No respiratory distress. Breath sounds: Normal breath sounds. Abdominal:  
   General: Bowel sounds are normal.  
   Palpations: Abdomen is soft. There is no mass. Tenderness: There is no abdominal tenderness. There is no guarding or rebound. Hernia: No hernia is present. Skin: 
   General: Skin is warm and dry. Neurological:  
   Mental Status: He is alert and oriented to person, place, and time. Gait: Gait normal.  
   Comments: Nl speech Psychiatric:     
   Speech: Speech normal.  
 
  
 
MDM Number of Diagnoses or Management Options Diagnosis management comments: Reasonable saturation a lung sound fairly clear. There would be concern for pulmonary embolism but check x-ray for pneumothorax, effusion, infiltrate. Check for congestive heart failure or dysrhythmia. Also check for anemia dehydration electrolyte abnormalities. Amount and/or Complexity of Data Reviewed Clinical lab tests: ordered and reviewed Tests in the radiology section of CPT®: ordered and reviewed Tests in the medicine section of CPT®: ordered and reviewed Review and summarize past medical records: yes (See HPI) Independent visualization of images, tracings, or specimens: yes (EKG reveals normal sinus rhythm. Partial right bundle branch block. No significant ST-T changes.) Risk of Complications, Morbidity, and/or Mortality Presenting problems: moderate Diagnostic procedures: minimal 
Management options: low Patient Progress Patient progress: stable Procedures Results Include: 
 
Recent Results (from the past 24 hour(s)) EKG, 12 LEAD, INITIAL Collection Time: 06/16/20  6:07 PM  
Result Value Ref Range Ventricular Rate 73 BPM  
 Atrial Rate 73 BPM  
 P-R Interval 204 ms QRS Duration 92 ms Q-T Interval 370 ms QTC Calculation (Bezet) 407 ms Calculated P Axis 56 degrees Calculated R Axis -70 degrees Calculated T Axis 74 degrees Diagnosis    
  !! AGE AND GENDER SPECIFIC ECG ANALYSIS !! Normal sinus rhythm Left axis deviation Pulmonary disease pattern Incomplete right bundle branch block Abnormal ECG No previous ECGs available CBC WITH AUTOMATED DIFF Collection Time: 06/16/20  6:16 PM  
Result Value Ref Range WBC 10.9 4.3 - 11.1 K/uL  
 RBC 4.04 (L) 4.23 - 5.6 M/uL  
 HGB 12.3 (L) 13.6 - 17.2 g/dL HCT 36.7 (L) 41.1 - 50.3 % MCV 90.8 79.6 - 97.8 FL  
 MCH 30.4 26.1 - 32.9 PG  
 MCHC 33.5 31.4 - 35.0 g/dL  
 RDW 13.4 11.9 - 14.6 % PLATELET 200 778 - 467 K/uL MPV 9.1 (L) 9.4 - 12.3 FL ABSOLUTE NRBC 0.00 0.0 - 0.2 K/uL  
 DF AUTOMATED NEUTROPHILS 72 43 - 78 % LYMPHOCYTES 16 13 - 44 % MONOCYTES 8 4.0 - 12.0 % EOSINOPHILS 2 0.5 - 7.8 % BASOPHILS 1 0.0 - 2.0 % IMMATURE GRANULOCYTES 1 0.0 - 5.0 %  
 ABS. NEUTROPHILS 7.9 1.7 - 8.2 K/UL  
 ABS. LYMPHOCYTES 1.7 0.5 - 4.6 K/UL  
 ABS. MONOCYTES 0.9 0.1 - 1.3 K/UL ABS. EOSINOPHILS 0.2 0.0 - 0.8 K/UL  
 ABS. BASOPHILS 0.1 0.0 - 0.2 K/UL  
 ABS. IMM. GRANS. 0.1 0.0 - 0.5 K/UL METABOLIC PANEL, COMPREHENSIVE Collection Time: 06/16/20  6:16 PM  
Result Value Ref Range Sodium 136 136 - 145 mmol/L Potassium 4.0 3.5 - 5.1 mmol/L Chloride 101 98 - 107 mmol/L  
 CO2 30 21 - 32 mmol/L Anion gap 5 (L) 7 - 16 mmol/L Glucose 119 (H) 65 - 100 mg/dL BUN 12 6 - 23 MG/DL Creatinine 1.12 0.8 - 1.5 MG/DL  
 GFR est AA >60 >60 ml/min/1.73m2 GFR est non-AA >60 >60 ml/min/1.73m2 Calcium 8.9 8.3 - 10.4 MG/DL Bilirubin, total 0.4 0.2 - 1.1 MG/DL  
 ALT (SGPT) 36 12 - 65 U/L  
 AST (SGOT) 21 15 - 37 U/L Alk. phosphatase 146 (H) 50 - 136 U/L Protein, total 7.8 6.3 - 8.2 g/dL Albumin 3.5 3.5 - 5.0 g/dL Globulin 4.3 (H) 2.3 - 3.5 g/dL A-G Ratio 0.8 (L) 1.2 - 3.5    
TROPONIN-HIGH SENSITIVITY Collection Time: 06/16/20  6:16 PM  
Result Value Ref Range Troponin-High Sensitivity 6.1 0 - 14 pg/mL NT-PRO BNP Collection Time: 06/16/20  6:16 PM  
Result Value Ref Range NT pro-BNP 24 5 - 125 PG/ML  
LACTIC ACID Collection Time: 06/16/20  6:48 PM  
Result Value Ref Range Lactic acid 1.4 0.4 - 2.0 MMOL/L Ct Chest W Cont Result Date: 6/16/2020 CT OF THE CHEST WITH INTRAVENOUS CONTRAST. INDICATION: Fever and cough. COMPARISON: None. TECHNIQUE:   2.5 mm axial scans from above the aortic arch to the lung bases with 100 cc nonionic intravenous contrast without acute complication. Intravenous contrast was given to evaluate for pulmonary embolism. Radiation dose reduction techniques were used for this study. Our CT scanners use one or more of the following:  Automated exposure control, adjustment of the mA and or kV according to patient size, iterative reconstruction.  FINDINGS:  -Pulmonary artery opacification: Adequate.  -No intraluminal filling defects within the pulmonary arterial tree to suggest pulmonary embolism.  -Aorta is normal caliber with a uniform lumen without evidence of dissection. -Mediastinum/koki: Right hilar and subcarinal nodes smaller. -Lungs atelectasis right lung base. .  -Included portion of the upper abdomen right hemidiaphragm is elevated. Nodule left adrenal gland stable. . -Soft tissue mass anterior left chest stable, 22 mm IMPRESSION:  Negative for pulmonary embolism. Mediastinal adenopathy and chest wall mass and adrenal nodule and basilar atelectasis similar to prior exam  
 
Xr Chest [facility] Result Date: 6/16/2020 CHEST X-RAY, one view. HISTORY:  Shortness of breath TECHNIQUE:  AP upright portable view. COMPARISON: None. Chest CT April 2020 reviewed FINDINGS:   Small amount of atelectasis right base, otherwise lungs are clear. Right hemidiaphragm mildly elevated. Right-sided chest port is present. IMPRESSION:  Small amount of atelectasis right base. 8:53 PM 
Patient feeling better this point. Will discharge home to follow-up with oncology. Patient states he worked more than usual today. There may be some chest wall pain with splinting causing his issues.

## 2020-06-16 NOTE — ED TRIAGE NOTES
Pt given mask upon arrival to ED. Presents with worsening cough and shortness of breath since yesterday morning. Pt is a lung cancer patient that is currently only receiving radiation. Does not wear home O2. States yellow sputum production and subjective fever. Currently afebrile. States tenderness around port but no obvious signs of infection and has not been accessed as of this time.

## 2020-06-17 NOTE — PROGRESS NOTES
This note will not be viewable in [address] 1st attempt to contact patient regarding COVID-19 risk education with no answer on home phone. Message left to return call. CC will attempt outreach again within 1 business day.

## 2020-06-17 NOTE — ED NOTES
I have reviewed discharge instructions with the patient. The patient verbalized understanding. Patient left ED via Discharge Method: ambulatory to Home with wife. Opportunity for questions and clarification provided. Patient given 0 scripts. Pt ambulatory off unit without difficulty or signs of acute distress. To continue your aftercare when you leave the hospital, you may receive an automated call from our care team to check in on how you are doing. This is a free service and part of our promise to provide the best care and service to meet your aftercare needs.  If you have questions, or wish to unsubscribe from this service please call [phone]. Thank you for Choosing our [facility].

## 2020-06-17 NOTE — DISCHARGE INSTRUCTIONS
Keep appointment with oncology for your chemotherapy. Call them recheck sooner for worse shortness of breath high fever or pain. Patient Education        Musculoskeletal Chest Pain: Care Instructions  Your Care Instructions     Chest pain is not always a sign that something is wrong with your heart or that you have another serious problem. The doctor thinks your chest pain is caused by strained muscles or ligaments, inflamed chest cartilage, or another problem in your chest, rather than by your heart. You may need more tests to find the cause of your chest pain. Follow-up care is a key part of your treatment and safety. Be sure to make and go to all appointments, and call your doctor if you are having problems. It's also a good idea to know your test results and keep a list of the medicines you take. How can you care for yourself at home? · Take pain medicines exactly as directed. ? If the doctor gave you a prescription medicine for pain, take it as prescribed. ? If you are not taking a prescription pain medicine, ask your doctor if you can take an over-the-counter medicine. · Rest and protect the sore area. · Stop, change, or take a break from any activity that may be causing your pain or soreness. · Put ice or a cold pack on the sore area for 10 to 20 minutes at a time. Try to do this every 1 to 2 hours for the next 3 days (when you are awake) or until the swelling goes down. Put a thin cloth between the ice and your skin. · After 2 or 3 days, apply a heating pad set on low or a warm cloth to the area that hurts. Some doctors suggest that you go back and forth between hot and cold. · Do not wrap or tape your ribs for support. This may cause you to take smaller breaths, which could increase your risk of lung problems. · Mentholated creams such as Bengay or Icy Hot may soothe sore muscles. Follow the instructions on the package. · Follow your doctor's instructions for exercising.   · Gentle stretching and massage may help you get better faster. Stretch slowly to the point just before pain begins, and hold the stretch for at least 15 to 30 seconds. Do this 3 or 4 times a day. Stretch just after you have applied heat. · As your pain gets better, slowly return to your normal activities. Any increased pain may be a sign that you need to rest a while longer. When should you call for help? [id number] anytime you think you may need emergency care. For example, call if:  · You have chest pain or pressure. This may occur with:  ? Sweating. ? Shortness of breath. ? Nausea or vomiting. ? Pain that spreads from the chest to the neck, jaw, or one or both shoulders or arms. ? Dizziness or lightheadedness. ? A fast or uneven pulse. After calling 911, chew 1 adult-strength aspirin. Wait for an ambulance. Do not try to drive yourself. · You have sudden chest pain and shortness of breath, or you cough up blood. Call your doctor now or seek immediate medical care if:  · You have any trouble breathing. · Your chest pain gets worse. · Your chest pain occurs consistently with exercise and is relieved by rest.  Watch closely for changes in your health, and be sure to contact your doctor if:  · Your chest pain does not get better after 1 week. Where can you learn more? Go to http://cyn-estephanie.info/  Enter V293 in the search box to learn more about \"Musculoskeletal Chest Pain: Care Instructions. \"  Current as of: June 26, 2019               Content Version: 12.5  © 6370-7275 Healthwise, Incorporated. Care instructions adapted under license by [employer] (which disclaims liability or warranty for this information). If you have questions about a medical condition or this instruction, always ask your healthcare professional. [place] [address] any warranty or liability for your use of this information.          Patient Education        Shortness of Breath: Care Instructions  Your Care Instructions  Shortness of breath has many causes. Sometimes conditions such as anxiety can lead to shortness of breath. Some people get mild shortness of breath when they exercise. Trouble breathing also can be a symptom of a serious problem, such as asthma, lung disease, emphysema, heart problems, and pneumonia. If your shortness of breath continues, you may need tests and treatment. Watch for any changes in your breathing and other symptoms. Follow-up care is a key part of your treatment and safety. Be sure to make and go to all appointments, and call your doctor if you are having problems. It's also a good idea to know your test results and keep a list of the medicines you take. How can you care for yourself at home? · Do not smoke or allow others to smoke around you. If you need help quitting, talk to your doctor about stop-smoking programs and medicines. These can increase your chances of quitting for good. · Get plenty of rest and sleep. · Take your medicines exactly as prescribed. Call your doctor if you think you are having a problem with your medicine. · Find healthy ways to deal with stress. ? Exercise daily. ? Get plenty of sleep. ? Eat regularly and well. When should you call for help? [id number] anytime you think you may need emergency care. For example, call if:  · You have severe shortness of breath. · You have symptoms of a heart attack. These may include:  ? Chest pain or pressure, or a strange feeling in the chest.  ? Sweating. ? Shortness of breath. ? Nausea or vomiting. ? Pain, pressure, or a strange feeling in the back, neck, jaw, or upper belly or in one or both shoulders or arms. ? Lightheadedness or sudden weakness. ? A fast or irregular heartbeat. After you call 911, the  may tell you to chew 1 adult-strength or 2 to 4 low-dose aspirin. Wait for an ambulance. Do not try to drive yourself.   Call your doctor now or seek immediate medical care if:  · Your shortness of breath gets worse or you start to wheeze. Wheezing is a high-pitched sound when you breathe. · You wake up at night out of breath or have to prop your head up on several pillows to breathe. · You are short of breath after only light activity or while at rest.  Watch closely for changes in your health, and be sure to contact your doctor if:  · You do not get better over the next 1 to 2 days. Where can you learn more? Go to http://cyn-estephanie.info/  Enter S780 in the search box to learn more about \"Shortness of Breath: Care Instructions. \"  Current as of: February 24, 2020               Content Version: 12.5  © 2006-2020 Healthwise, Incorporated. Care instructions adapted under license by [employer] (which disclaims liability or warranty for this information). If you have questions about a medical condition or this instruction, always ask your healthcare professional. [address] any warranty or liability for your use of this information.

## 2020-06-18 NOTE — PROGRESS NOTES
Arrived to the [facility]. Assessment complete, labs reviewed. Consent obtained/verified for chemotherapy. D1C1 Alimta, Keytruda, Carboplatin completed. Patient tolerated without problems. Pt moniterd thirty minuets post completion of treatment, no issues noted. Any issues or concerns during appointment: Pt educated on how to take prescribed antiemetics. Pt instructed to call with any concerns or issues. Pt verbalized understanding. Time for questions allowed. Pt denies any questions at this time. Patient aware of next infusion appointment on 6/29/2020 (date) at 56 (time). Discharged ambulatory.

## 2020-06-18 NOTE — PROGRESS NOTES
Clinical Social Work Note  Name: Lien Mackey    : 1965    MRN: [id number]    Date of Service: 2020    Type of Service: Case Management & Health and Behavior Intervention - Initial Assessment (15497)    Length of Service: 15 minutes    Patient Diagnosis:   1. Malignant neoplasm of overlapping sites of left lung [facility])         Referral Source: Infusion RN    Reason for Visit: D1C1    CM Note: Seen patient by himself at Infusion. Patient has been  for 36 years and has great family support. Patient got early custodial from [employer]. He is a A/C specialist. SW introduced self, presented Support Services Program and others. At this moment, pt denies any psychosocial and economic needs.  Note: LISW-CP discussed Distress by using NCCN Guidelines for Patients, Distress with patient. LISW-CP overviewed relaxation and practiced breathing with the patient. Booklet addressing relaxation and breathing techniques were given. Mini Mental Status Exam: Lien Mackey was dressed properly. No abnormal psychomotor movements observed. Intellectual functioning appeared to be intact. Insight was adequate. Judgment was adequate. Patient did not report suicidal ideations, intent or plans. Speech was coherent. Thought process was clear. Patient did not report homicidal ideations, intent or plans. Patient was oriented to self, place, time and situation. Protective Factors: Current care for physical and mental illness, adequate insight and judgment, family support, cultural and Jain beliefs and values that support self-care. Next Steps: LISW-CP gave contact information and encouraged pt to call should any needs arise. Pt verbalized understanding. SW intends to follow up as needed.       3 most recent [facility] Screens 2020   Little interest or pleasure in doing things Not at all Not at all Not at all   Feeling down, depressed, irritable, or hopeless Not at all Not at all Not at all   Total Score PHQ 2 0 0 0           Electronically Signed By:  Enid Milton

## 2020-06-25 NOTE — NURSE NAVIGATOR
Consult for brain mets. MRI brain 6-23-20. Taking Decadron 4 mg bid. Pain in lungs with lying per pt. Taking Tramadol as needed for pain . Pt c/o vomiting on Sunday 6-21-20. CONSENTS SIGNED FOR RT BRAIN. CT [facility] SCHEDULED Monday 6-29-20 AT 10 AM. APT GIVEN TO THE PT. 
EMAILED DR. GUERRERO'S NURSE AND NAVIGATION REGARDING POSSIBLY NEEDING TO  MOVE OUT 7-9-20 CHEMO. PLAN IS 10 FRACTIONS PER DR. BHATTI.  
 
Sara Lundberg RN

## 2020-06-25 NOTE — CONSULTS
[facility] 
CONSULTATION Name:  Parker Egan 
MR#:  [id number] :  1965 ACCOUNT #:  [de-identified] DATE OF SERVICE:  2020 REASON FOR CONSULTATION:  Metastatic lung cancer to the brain for assessment for brain radiation treatment. HISTORY OF PRESENT ILLNESS:  The patient is well known to me since a few weeks ago. I saw him on 2020 with newly diagnosed lung cancer for assessment for radiation treatment. He was found to have a tumor, non-small cell, in the right mid lung with right infrahilar, hilar, and mediastinal heather disease. He also was found to have a nodule, metastatic, to the left chest wall. In addition, there was a pancreatic mass. Biopsy from the left chest wall nodule as well as the pancreatic mass revealed metastatic lung cancer. The patient was discussed in the Lung Tumor Conference, and the decision is to treat him with up front chemotherapy. MRI of the brain which was done on 2020 showed multiple brain metastasis with edema and 5-mm midline shift. The patient subsequently was started on Decadron and referred for radiation treatment. When I saw him, he basically denied any significant symptoms. He stated that he completed one cycle of chemotherapy. He denied any neurological symptoms. PAST MEDICAL HISTORY:   
Past Medical History:  
Diagnosis Date  Brain metastases ([address] 2020  Cancer ([facility] [address] lung  Hypertension   
 managed with meds  Psychiatric disorder   
 depression The patient denies history of collagen vascular diseases, pacemaker insertion, prior radiation or prior chemotherapy. PAST SURGICAL HISTORY:  
Past Surgical History:  
Procedure Laterality Date  HX ORTHOPAEDIC    
 right knee  IR INSERT TUNL CVC W PORT OVER 5 YEARS  2020 MEDICATIONS:  
 
Current Outpatient Medications:  
  dexAMETHasone (Decadron) 4 mg tablet, Take 4 mg by mouth two (2) times daily (with meals). , Disp: 30 Tab, Rfl: 1 
  HYDROcodone-homatropine (HYCODAN) 5-1.5 mg/5 mL (5 mL) syrup, Take 5 mL by mouth four (4) times daily for 7 days. Max Daily Amount: 20 mL., Disp: 140 mL, Rfl: 0 
  traMADoL (ULTRAM) 50 mg tablet, Take 1 Tab by mouth every six (6) hours as needed for Pain for up to 15 days. Max Daily Amount: 200 mg., Disp: 60 Tab, Rfl: 0 
  folic acid (FOLVITE) 1 mg tablet, Take 1 Tab by mouth daily. , Disp: 30 Tab, Rfl: 2 
  prochlorperazine (Compazine) 10 mg tablet, Take 1 Tab by mouth every six (6) hours as needed (as needed for nausea and vomiting). , Disp: 90 Tab, Rfl: 2 
  lisinopriL (PRINIVIL, ZESTRIL) 20 mg tablet, Take 1 Tab by mouth daily. Indications: high blood pressure, Disp: 30 Tab, Rfl: 1 
  sertraline (ZOLOFT) 100 mg tablet, Take 1 Tab by mouth daily. Indications: anxiousness associated with depression, Disp: 30 Tab, Rfl: 1 
  lidocaine-prilocaine (EMLA) topical cream, Apply  to affected area as needed for Pain. Apply 45 min to port site prior to needle stick, Disp: 30 g, Rfl: 3 
  ondansetron (ZOFRAN ODT) 8 mg disintegrating tablet, Take 1 Tab by mouth every eight (8) hours as needed for Nausea or Vomiting. Indications: prevent nausea and vomiting from cancer chemotherapy, Disp: 90 Tab, Rfl: 3 
  clonazePAM (KlonoPIN) 1 mg tablet, Take 1 Tab by mouth every eight (8) hours as needed for Anxiety. Max Daily Amount: 3 mg., Disp: 12 Tab, Rfl: 0 ALLERGIES:  
No Known Allergies SOCIAL HISTORY:  
Social History Socioeconomic History  Marital status:  Spouse name: Not on file  Number of children: Not on file  Years of education: Not on file  Highest education level: Not on file Occupational History  Not on file Social Needs  Financial resource strain: Not on file  Food insecurity Worry: Not on file Inability: Not on file  Transportation needs Medical: Not on file Non-medical: Not on file Tobacco Use  
  Smoking status: Former Smoker Packs/day: 1.50 Years: 25.00 Pack years: 37.50 Last attempt to quit: 2005 Years since quitting: 15.4  Smokeless tobacco: Former User Quit date: 4/16/2005 Substance and Sexual Activity  Alcohol use: Yes Comment: socially  Drug use: Not Currently  Sexual activity: Not on file Lifestyle  Physical activity Days per week: Not on file Minutes per session: Not on file  Stress: Not on file Relationships  Social connections Talks on phone: Not on file Gets together: Not on file Attends [employer] service: Not on file Active member of club or organization: Not on file Attends meetings of clubs or organizations: Not on file Relationship status: Not on file  Intimate partner violence Fear of current or ex partner: Not on file Emotionally abused: Not on file Physically abused: Not on file Forced sexual activity: Not on file Other Topics Concern [address] Service Not Asked  Blood Transfusions Not Asked  Caffeine Concern Not Asked  Occupational Exposure Not Asked Brendolyn Ashley Hazards Not Asked  Sleep Concern Not Asked  Stress Concern Not Asked  Weight Concern Not Asked  Special Diet Not Asked  Back Care Not Asked  Exercise Not Asked  Bike Helmet Not Asked  Seat Belt Not Asked  Self-Exams Not Asked Social History Narrative  Not on file FAMILY HISTORY:  
No family history on file. REVIEW OF SYSTEMS: A full 12 point review of systems was completed and was negative unless noted in the history of present illness. PHYSICAL EXAMINATION:  
ECOG Performance status :0 
VITAL SIGNS:  
Visit Vitals /73 (BP 1 Location: Right arm, BP Patient Position: Sitting) Pulse 65 Temp 97.1 °F (36.2 °C) Wt 109.8 kg (242 lb 1.6 oz) SpO2 96% BMI 33.77 kg/m² LABORATORY:  
Lab Results Component Value Date/Time  Sodium 136 06/16/2020 06:16 PM  
 Potassium 4.0 06/16/2020 06:16 PM  
 Chloride 101 06/16/2020 06:16 PM  
 CO2 30 06/16/2020 06:16 PM  
 Anion gap 5 (L) 06/16/2020 06:16 PM  
 Glucose 119 (H) 06/16/2020 06:16 PM  
 BUN 12 06/16/2020 06:16 PM  
 Creatinine 1.12 06/16/2020 06:16 PM  
 GFR est AA >60 06/16/2020 06:16 PM  
 GFR est non-AA >60 06/16/2020 06:16 PM  
 Calcium 8.9 06/16/2020 06:16 PM  
 Magnesium 2.3 06/12/2020 02:25 PM  
 Albumin 3.5 06/16/2020 06:16 PM  
 Protein, total 7.8 06/16/2020 06:16 PM  
 Globulin 4.3 (H) 06/16/2020 06:16 PM  
 A-G Ratio 0.8 (L) 06/16/2020 06:16 PM  
 ALT (SGPT) 36 06/16/2020 06:16 PM  
 
Lab Results Component Value Date/Time WBC 10.9 06/16/2020 06:16 PM  
 HGB 12.3 (L) 06/16/2020 06:16 PM  
 HCT 36.7 (L) 06/16/2020 06:16 PM  
 PLATELET 633 03/55/2247 06:16 PM  
 
 
RADIOLOGY:   
Ir Insert Tunl Cvc W Port Over 5 Years Result Date: 5/5/2020 Title: Chest port placement through the right internal jugular vein using ultrasound and fluoroscopic guidance with maximal sterile barrier appropriately documented and fluoroscopy time and images documented in report. History: 64-year-old male with lung cancer. :  Evert Britton PA-C Supervising Physician: Ricardo Shin M.D. Consent: Informed written and oral consent was obtained from the patient after explanation of benefits and risks (including, but not limited to: infection, vascular injury, lung injury, and thrombus formation) of procedure. The patient's questions were answered to their satisfaction. They stated understanding and requested that we proceed. Procedure: This port was inserted with all elements of maximal sterile barrier technique, cap and mask and sterile gown and sterile gloves and sterile full body drape and hygiene and 2% chlorhexidine for cutaneous antisepsis and sterile ultrasound gel and sterile ultrasound probe cover.  Following routine prep and drape of the right neck and chest, a local field block with lidocaine was achieved. Ultrasound evaluation of all potential access sites was performed due to lack of a palpable vein. The vein was not palpable due to overlying adipose tissue. Using real-time ultrasound guidance, with appropriate image recording, the patent right internal jugular vein was accessed. Using fluoroscopy, a peel-away sheath was placed over a wire. A 1-inch incision was made on the right chest wall and a subcutaneous pocket created. The catheter was tunneled subcutaneously and passed down the peel-away sheath positioning the tip in the right atrium, confirmed fluoroscopically. The catheter was cut to the appropriate length and connected to the [facility] which was then placed in the pocket. The Port-A-Cath was accessed, aspirated easily, and was filled with heparinized saline. All incisions were closed with absorbable suture. Sterile surgical glue was placed on the skin. Complications: None. Radiation dose: Fluoroscopy time: 6 seconds. Reference air kerma (mGy): 11 Kerma area product (cGy.cm2):  287 Fluoroscopic images: 1 Contrast:  0 milliliters. Medications:  MAC. Ancef was given for prophylactic antibiotic therapy. Impression: Uncomplicated right internal jugular vein tunneled power injectable chest port placement. Plan: The patient will recover for 1 hour. The chest port is ready for use. Mri Brain W Wo Cont Result Date: 6/24/2020 MRI BRAIN WITHOUT and WITH CONTRAST. HISTORY:  Lung cancer staging. COMPARISON:  None. Study performed within 24 hours of admission. TECHNIQUE:  Sagittal T1, axial T1, T2, FLAIR, gradient echo, diffusion with ADC map were followed by 23cc intravenous gadolinium after which axial and coronal T1 images were repeated. Intravenous contrast was given to increase specificity of T2 abnormalities. FINDINGS: -Contrast: There are multiple enhancing masses consistent metastatic disease.  The largest is in the right temporal lobe and measures 41 x 31 mm on an axial image. There is moderate surrounding vasogenic edema. A 21 x 19 mm enhancing nodule in the left occipital lobe. A 15 x 15 mm nodule right frontal lobe. There is a parafalcine uniformly enhancing 20 x 7 mm nodule, possibly a meningioma. There is 5 mm midline shift. -Diffusion images: Unremarkable. -Gradient echo images: Some susceptibility artifact in the left occipital metastasis. -FLAIR sequence images: Unremarkable. -Lateral ventricles are: Mildly effaced.  -Pituitary and parasellar structures: unremarkable on the sagittal T1 images.  -There are normal T2 flow-voids in the major vessels.   -Posterior fossa structures are unremarkable.  -Basal ganglia: appear symmetric.  -Orbits: are grossly normal. -Paranasal sinuses: are clear. IMPRESSION: At least 3 brain metastases as above which measure up to 41 mm in the right temporal lobe with moderate surrounding vasogenic edema and 5 mm of leftward midline shift. Results communicated to Dr Tatiana Tracy this morning. [address] Mri Abd W Wo Cont Result Date: 5/1/2020 EXAM: MRI ABDOMEN WITHOUT AND WITH IV CONTRAST Comparison: PET/CT dated 4/28/2020. Indication: Possible pancreatic tail mass. Technique:  Multiplanar, multisequence MR imaging was performed of the abdomen prior to and following gadolinium contrast. 22 mL  Dotarem contrast material was administrated intravenously for the examination. This study was acquired following the IV administration of contrast material, given the patient's indications for the examination. If IV contrast material had not been administered, the likely of detecting abnormalities relevant to the patient's condition would have been substantially decreased. Three dimensional MRCP was performed using maximum intensity projection reconstruction. FINDINGS: Visualized Lungs: Normal. Liver: Normal in size and morphology. No focal lesions.  Gallbladder/biliary: Normal gallbladder. No biliary dilatation. Pancreas: Previously seen lesion of the pancreatic tail is suboptimally evaluated because of motion artifact. Again demonstrated is a masslike lesion of the pancreatic tail measuring 2.5 x 1.9 cm with apparent mild enhancement. No pancreatic duct dilation or peripancreatic inflammatory changes. Spleen: Normal. Adrenals: Normal. Kidneys: No focal lesion or hydronephrosis. Gastrointestinal: Normal. Peritoneum/retroperitoneum: Normal. Lymph nodes: Normal. Bones/Soft tissues: Normal.  
 
IMPRESSION: 2.5 x 1.9 cm lesion of the pancreatic tail with apparent mild enhancement, which is indeterminate in the setting of a primary lung mass reflecting either metastasis or a primary pancreatic mass. Intrapancreatic splenule is unlikely given signal differences between the lesion and spleen. Endoscopic ultrasound may be useful in further evaluating. Ct Chest W Cont Result Date: 6/16/2020 CT OF THE CHEST WITH INTRAVENOUS CONTRAST. INDICATION: Fever and cough. COMPARISON: None. TECHNIQUE:   2.5 mm axial scans from above the aortic arch to the lung bases with 100 cc nonionic intravenous contrast without acute complication. Intravenous contrast was given to evaluate for pulmonary embolism. Radiation dose reduction techniques were used for this study. Our CT scanners use one or more of the following:  Automated exposure control, adjustment of the mA and or kV according to patient size, iterative reconstruction. FINDINGS:  -Pulmonary artery opacification: Adequate.  -No intraluminal filling defects within the pulmonary arterial tree to suggest pulmonary embolism.  -Aorta is normal caliber with a uniform lumen without evidence of dissection. -Mediastinum/koki: Right hilar and subcarinal nodes smaller. -Lungs atelectasis right lung base. .  -Included portion of the upper abdomen right hemidiaphragm is elevated. Nodule left adrenal gland stable. . -Soft tissue mass anterior left chest stable, 22 mm IMPRESSION:  Negative for pulmonary embolism. Mediastinal adenopathy and chest wall mass and adrenal nodule and basilar atelectasis similar to prior exam  
 
Ct Chest W Cont Result Date: 4/16/2020 CT OF THE CHEST WITH INTRAVENOUS CONTRAST, 4/16/2020 Indication: Recently treated for pneumonia with continued shortness of breath. Comparison: None Technique:   2.5 mm axial scans from above the aortic arch to the lung bases following the uneventful administration of 70 mL of Isovue-370. Intravenous contrast was given to evaluate for pulmonary embolism. All CT scans performed at this facility use one or all of the following: Automated exposure control, adjustment of the mA and/or kVp according to patient's size, iterative reconstruction. Findings: The base of the neck is unremarkable in appearance. Prominent paratracheal mediastinal lymph nodes are seen. Additional soft tissue density seen at the right hilum which could represent additional lymph nodes although will be described later in this report. The thoracic aorta is normal in caliber. Opacification of the pulmonary arteries is adequate. However, assessment particularly of lower arterial structures is somewhat limited by patient breathing motion artifact. No defined arterial filling defect is seen to suggest pulmonary embolism. Linear hypodensities occur within central left lung vascular structures most evident on axial images 40, and 44. However, these correspond to central pulmonary veins and therefore not consistent with pulmonary emboli. Given that pulmonary venous thrombi would be very unusual, it is favor that these are artifactual. If these did represent thrombus, then the nonocclusive appearance with suggest more chronic thrombus. Evaluation with lung windows demonstrates abnormal changes of the right lung which are incompletely characterized.  Previously, soft tissue density was seen at the right hilum which is now best appreciated on axial image 41 is appearance is most suggestive of adenopathy at this level. However, an additional soft tissue density is seen in the medial right middle lobe on axial image 48 and there is a collapsed appearance of the right middle lobe as well as partial collapse of the right lower lobe. There is abnormal \"cut off\" of the airway extending to the right middle lobe best appreciated on axial image 50. In addition, multiple central right infrahilar arterial structures appear attenuated. Given these findings, the possibility of a central obstructing abnormality such as malignancy cannot be excluded. No pleural effusion is seen. Lungs are expanded without evidence for pneumothorax. No acute osseous abnormality is seen. A small soft tissue nodule is seen in the left anterior upper chest on axial image 17 measuring 1.7 cm in size which does not extend to the skin surface and therefore cannot be confirmed to represent a benign structure such as a sebaceous cyst..  Limited evaluation of the upper abdomen demonstrates abnormal nodular thickening of the body of the left adrenal gland best appreciated on axial image 114 suggesting a small adrenal lesion. IMPRESSION:  1. No evidence for pulmonary embolism. Changes are seen in left lung vascular structures which appear to involve pulmonary veins as described above. 2. Abnormal changes in the right lung which are incompletely characterized. However, there appears to be collapse of the right middle lobe and partial collapse of the right lower lobe with \"cut off\" of right middle lobe airways. The appearance is concerning for a central obstructing abnormality with a potential malignancy not excluded. Further evaluation as clinically indicated is recommended.  3. Prominent mediastinal, and right hilar lymph nodes, left adrenal nodularity, and soft tissue lesion in the left anterior upper chest which are incompletely characterized. If the patient is found to have a malignancy, then metastatic lesions cannot be excluded. If the patient is not found to have a pulmonary malignancy, then the lymph nodes may represent benign reactive changes. The left adrenal nodule and left anterior upper chest lesion would remain incompletely characterized. The left adrenal nodule would be best further assessed with an adrenal mass protocol CT scan. The left anterior upper chest lesion would be best initially assessed with clinical exam, and ultrasound. Xr Chest [facility] Result Date: 6/16/2020 CHEST X-RAY, one view. HISTORY:  Shortness of breath TECHNIQUE:  AP upright portable view. COMPARISON: None. Chest CT April 2020 reviewed FINDINGS:   Small amount of atelectasis right base, otherwise lungs are clear. Right hemidiaphragm mildly elevated. Right-sided chest port is present. IMPRESSION:  Small amount of atelectasis right base. Pet/ct Tumor Image Skull Thigh W (ini) Result Date: 4/28/2020 PET/CT: 4/28/2020 INDICATION: Initial diagnosis of lung mass. TECHNIQUE: After oral administration of gastroview and intravenous administration of 17.32 mCi of F18 FDG, noncontrast CT images were obtained for attenuation correction and for fusion with emission PET images. A series of overlapping emission PET images were then obtained beginning 60 minutes after injection of FDG. The area imaged spanned the region from the skull base to the mid thighs. All CT scans performed at this facility use one or all of the following: Automated exposure control, adjustment of the mA and/or kVp according to patient's size, iterative reconstruction. COMPARISON: CT chest with contrast 4/16/2020 FINDINGS: NECK/CHEST: Only physiologic activity is seen in the neck. No adenopathy is suggested on limited CT images.  Previously, abnormal changes were seen at the central right inferior hilum. Abnormal hypermetabolic activity is seen at this level demonstrating a maximum SUV of 11.2 g/mL furthermore raising concern for potential neoplasm. This corresponds to abnormal soft tissue density which overall measures 5.7 cm x 3.8 cm in greatest transverse dimension although this could include benign vascular structures or atelectatic lung on this noncontrast study. Additional increased activity is seen associated with multiple paratracheal and subcarinal mediastinal lymph nodes concerning for heather metastases. The largest lymph node is a low paratracheal lymph node seen on image 79 measuring 1.1 cm short axis. In addition to mediastinal adenopathy, there is a prominent right hilar lymph node seen on axial image 85 measuring 1.6 cm short axis which demonstrates mild activity concerning for an additional metastatic lymph node. Lastly, increased activity is seen associated with the superficial soft tissue mass in the left anterior upper chest seen on axial image 64 measuring 1.9 cm in size and demonstrating a maximum SUV of 10 g/mL. The appearance is felt to be most concerning for a soft tissue metastasis. No aggressive osseous lesion is seen. ABDOMEN/PELVIS: Previously, abnormal nodularity was seen of the left adrenal gland. This is now seen on axial image 147 with thickening of the left adrenal body measuring up to 10 mm in width. Only mild PET activity is seen associated with this finding which is not clearly above background activity of the liver. Currently, this is favored to be benign and could represent a benign lesion such as an adenoma. Mild focal activity is seen in the most proximal pancreatic tail on axial PET/CT image 145. In retrospect, some questionable mild attenuation changes were seen at this level on the prior contrasted CT scan of the chest visualized on axial image 117.  A pancreatic tail lesion is difficult to exclude given these findings. This should be further assessed. No focal hypermetabolic hepatic lesion is seen. No adenopathy is seen. Moderate atherosclerotic calcification is seen of the abdominal aorta. No aggressive osseous lesion is seen. Only physiologic activity seen in the pelvis. No adenopathy is seen. No aggressive osseous lesion is seen. IMPRESSION: 1. Abnormal PET activity associated with abnormal CT changes at the right inferior hilum raising concern for an obstructing central neoplasm of the right lung. Further evaluation as clinically indicated is recommended. 2. Increased activity within mildly prominent mediastinal and right hilar lymph nodes. In addition, increased activity is seen associated with a soft tissue mass in the left anterior upper chest wall measuring 1.9 cm in size concerning for metastatic lesions. No evidence for metastatic disease is otherwise seen. Thickening is seen in the body of the left adrenal gland without clear worrisome activity favored to represent benign changes as described above. 3. Focal activity in the most proximal pancreatic tail which demonstrates questionable attenuation changes on the prior contrasted CT scan of the chest. A proximal pancreatic lesion cannot be excluded. This would be best further assessed with a pancreatic mass protocol MRI or CT scan. If the patient cannot adequately hold his breath given the currently visualized lung changes, then CT would be favored given that this would be less prone to motion degradation. PHYSICAL EXAMINATION: 
GENERAL:  He looks well, in no pain or distress. NECK:  There is no cervical or supraclavicular node enlargement. CHEST:  Left chest wall nodule feels to be slightly smaller after the first cycle of chemotherapy. Remaining of his chest examination revealed decreased air entry. ABDOMEN:  Unremarkable. EXTREMITIES:  Lower limbs reveal no edema. NEUROLOGICAL:  He is oriented with good mental status. Cranial nerves are intact. Motor power is good in the upper and lower limbs. He has intact sensation with good coordination. Deep tendon reflexes are 2+ bilaterally. IMPRESSION:  Metastatic non-small cell lung cancer to the left chest wall, pancreas, and MRI of the brain showed multiple metastatic lesions with edema and 5-mm midline shift. RECOMMENDATION:  The patient will undergo 3000 cGy in 10 treatments to the whole brain using 3D conformal.  Early and delayed side effects as well as benefits of treatment were discussed with him. This dose of radiation will control metastatic disease in the brain slightly over 50%. The large nodule which measures about 4 cm will be assessed down the road with MRI post treatment. Early side effects which include the patient might feel tired, might lose a couple of pounds, he will lose his hair usually on the third week, meaning after completing the two weeks of radiation treatment. The patient was advised to use baby shampoo to minimize the acute reactions of the scalp. The patient's short memory will be altered as a side effect of radiation to the brain. I should mention the tumor itself will cause short memory changes as well. The patient started on Decadron. He does not know the dose of Decadron. He mentioned that his wife is giving it to him, but next time, we will see how much he is taking the Decadron and we will taper him as soon as possible. Once the patient has completed radiation treatment, there are subacute radiation reactions which occur two months post radiation, and again, those are secondary to edema of the brain and they are managed successfully with Decadron and they occur in about 20% of patients. Late side effects which occur six months to many years and again those brain necrosis which should not occur with this palliative dose of radiation to the brain. The patient understands the benefits versus early and delayed side effects and agreed to the treatment. Again, I would like to thank you very much for letting me participate in his care. Yasir Polo MD 
 
 
[id number]/ 
D:  06/25/2020 14:01 
T:  06/25/2020 14:52 
JOB #:  [id number]

## 2020-07-02 NOTE — ED NOTES
I have reviewed discharge instructions with the patient. The patient verbalized understanding. Patient left ED via Discharge Method: ambulatory to Home with friend. Opportunity for questions and clarification provided. Patient in no acute distress upon discharge. Patient given 4 scripts. To continue your aftercare when you leave the hospital, you may receive an automated call from our care team to check in on how you are doing. This is a free service and part of our promise to provide the best care and service to meet your aftercare needs.  If you have questions, or wish to unsubscribe from this service please call [phone]. Thank you for Choosing our [facility] Emergency Department.

## 2020-07-02 NOTE — ED PROVIDER NOTES
53-year-old male patient presents to the emergency department with reports of ongoing nausea, vomiting and what he describes as bloody emesis. Patient states symptoms started today. He is experienced some nausea and vomiting intermittently since his most recent chemotherapy treatment. He is attempted medications prescribed to him by his oncologist without effect. He estimates approximately 7 episodes of vomiting today. He describes blood as light bright red appearing material mixed with his emesis. He denies associated fever or chills. He denies any significant pain with his symptoms. He reports normal bowel and bladder habits. Patient does have a chronic cough that is unchanged at this time. He is currently under treatment for lung cancer and last received chemotherapy last week. He is followed by Dr. Jenelle Graf of the oncology department. Past Medical History:  
Diagnosis Date  Brain metastases ([facility] [address] 06/23/2020  Cancer ([facility] [address] lung  Hypertension   
 managed with meds  Psychiatric disorder   
 depression Past Surgical History:  
Procedure Laterality Date  HX ORTHOPAEDIC    
 right knee  IR INSERT TUNL CVC W PORT OVER 5 YEARS  5/5/2020 No family history on file. Social History Socioeconomic History  Marital status:  Spouse name: Not on file  Number of children: Not on file  Years of education: Not on file  Highest education level: Not on file Occupational History  Not on file Social Needs  Financial resource strain: Not on file  Food insecurity Worry: Not on file Inability: Not on file  Transportation needs Medical: Not on file Non-medical: Not on file Tobacco Use  Smoking status: Former Smoker Packs/day: 1.50 Years: 25.00 Pack years: 37.50 Last attempt to quit: 2005 Years since quitting: 15.5  Smokeless tobacco: Former User Quit date: 4/16/2005 Substance and Sexual Activity  Alcohol use: Yes Comment: socially  Drug use: Not Currently  Sexual activity: Not on file Lifestyle  Physical activity Days per week: Not on file Minutes per session: Not on file  Stress: Not on file Relationships  Social connections Talks on phone: Not on file Gets together: Not on file Attends [employer]ism service: Not on file Active member of club or organization: Not on file Attends meetings of clubs or organizations: Not on file Relationship status: Not on file  Intimate partner violence Fear of current or ex partner: Not on file Emotionally abused: Not on file Physically abused: Not on file Forced sexual activity: Not on file Other Topics Concern [address] Service Not Asked  Blood Transfusions Not Asked  Caffeine Concern Not Asked  Occupational Exposure Not Asked D[employer] Maxcy Hazards Not Asked  Sleep Concern Not Asked  Stress Concern Not Asked  Weight Concern Not Asked  Special Diet Not Asked  Back Care Not Asked  Exercise Not Asked  Bike Helmet Not Asked  Seat Belt Not Asked  Self-Exams Not Asked Social History Narrative  Not on file ALLERGIES: Patient has no known allergies. Review of Systems Constitutional: Negative for chills, diaphoresis and fever. HENT: Negative for congestion, sneezing and sore throat. Eyes: Negative for visual disturbance. Respiratory: Positive for cough. Negative for chest tightness, shortness of breath and wheezing. Cardiovascular: Negative for chest pain and leg swelling. Gastrointestinal: Positive for nausea and vomiting. Negative for abdominal pain, blood in stool and diarrhea. Endocrine: Negative for polyuria. Genitourinary: Negative for difficulty urinating, dysuria, flank pain, hematuria and urgency. Musculoskeletal: Negative for back pain, myalgias, neck pain and neck stiffness. Skin: Negative for color change and rash. Neurological: Negative for dizziness, syncope, speech difficulty, weakness, light-headedness, numbness and headaches. Psychiatric/Behavioral: Negative for behavioral problems. All other systems reviewed and are negative. Vitals:  
 07/01/20 2201 07/01/20 2204 07/01/20 2213 BP: 141/74 141/74 Pulse: 80 80 Resp: 22 18 Temp: 98.3 °F (36.8 °C) 98.4 °F (36.9 °C) SpO2: 97% 97% 97% Weight: 109.8 kg (242 lb) 117.9 kg (260 lb) Height: 5' 11\" (1.803 m) 5' 10\" (1.778 m) Physical Exam 
Vitals signs and nursing note reviewed. Constitutional:   
   General: He is not in acute distress. Appearance: He is well-developed. He is not diaphoretic. Comments: Generally well-appearing male patient, alert and oriented to person place and time. No acute distress, speaks in clear, fluid sentences. HENT:  
   Head: Normocephalic and atraumatic. Right Ear: External ear normal.  
   Left Ear: External ear normal.  
   Nose: Nose normal.  
Eyes:  
   Pupils: Pupils are equal, round, and reactive to light. Neck: Musculoskeletal: Normal range of motion. Cardiovascular:  
   Rate and Rhythm: Normal rate and regular rhythm. Heart sounds: Normal heart sounds. No murmur. No friction rub. No gallop. Pulmonary:  
   Effort: Pulmonary effort is normal. No respiratory distress. Breath sounds: Normal breath sounds. No stridor. No decreased breath sounds, wheezing, rhonchi or rales. Comments: Lungs are coarse but clear, no evidence of respiratory difficulty or distress, no tachypnea. Chest:  
   Chest wall: No tenderness. Abdominal:  
   General: There is no distension. Palpations: Abdomen is soft. There is no mass. Tenderness: There is no abdominal tenderness. There is no guarding or rebound. Hernia: No hernia is present. Comments: Unremarkable abdominal exam.  
Musculoskeletal: Normal range of motion. General: No tenderness or deformity. Skin: 
   General: Skin is warm and dry. Neurological:  
   Mental Status: He is alert and oriented to person, place, and time. Cranial Nerves: No cranial nerve deficit. MDM Number of Diagnoses or Management Options Drug-induced insomnia ([address]): new and requires workup Nausea and vomiting, intractability of vomiting not specified, unspecified vomiting type: new and requires workup Diagnosis management comments: Patient's labs appear stable. His x-ray is unremarkable. He is exhibiting no evidence of return of nausea and vomiting following his initial treatment with Phenergan. He is now resting comfortably as well. Exam does not suggest abdominal pathology, I do not feel patient requires further imaging at this time. We will attempt p.o. challenge and plan for dispel with outpatient management of nausea and vomiting. Voice dictation software was used during the making of this note. This software is not perfect and grammatical and other typographical errors may be present. This note has been proofread, but may still contain errors. [address] [facility]; 7/1/2020 @11:38 PM  
=================================================================== Amount and/or Complexity of Data Reviewed Clinical lab tests: ordered and reviewed Tests in the radiology section of CPT®: ordered and reviewed Tests in the medicine section of CPT®: ordered and reviewed Independent visualization of images, tracings, or specimens: yes Risk of Complications, Morbidity, and/or Mortality Presenting problems: moderate Diagnostic procedures: low Management options: moderate Patient Progress Patient progress: stable Procedures

## 2020-07-02 NOTE — PROGRESS NOTES
Patient contacted regarding recent discharge and COVID-19 risk. Discussed COVID-19 related testing which was not done at this time. Test results were not done. Patient informed of results, if available? no   
LPN Care Coordinator  contacted the patient by telephone to perform post discharge assessment. Verified name and  with patient as identifiers. Patient has following risk factors of: COPD and lung cancer presently on Chemotherapy . CC reviewed discharge instructions, medical action plan and red flags related to discharge diagnosis. Reviewed and educated them on any new and changed medications related to discharge diagnosis. Advised obtaining a 90-day supply of all daily and as-needed medications. Education provided regarding infection prevention, and signs and symptoms of COVID-19 and when to seek medical attention with patient who verbalized understanding. Discussed exposure protocols and quarantine from [address] you at higher risk for severe illness  and given an opportunity for questions and concerns. The patient agrees to contact the COVID-19 hotline [phone] or PCP office for questions related to their healthcare. CC provided contact information for future reference. From CDC: Are you at higher risk for severe illness?  Wash your hands often.  Avoid close contact (6 feet, which is about two arm lengths) with people who are sick.  Put distance between yourself and other people if COVID-19 is spreading in your community.  Clean and disinfect frequently touched surfaces.  Avoid all cruise travel and non-essential air travel.  Call your healthcare professional if you have concerns about COVID-19 and your underlying condition or if you are sick. For more information on steps you can take to protect yourself, see CDC's How to Protect Yourself Patient/family/caregiver given information for [employer] and agrees to enroll no Patient's preferred e-mail:  N/A Patient's preferred phone number: [phone] Based on Loop alert triggers, patient will be contacted by nurse care manager for worsening symptoms. Plan for follow-up call in 7-14 days based on severity of symptoms and risk factors.

## 2020-07-02 NOTE — ED TRIAGE NOTES
Last chemo was on the 15th and about 4 hours ago started having nausea /vomiting  and diarrhea x 4. Pt started to have streaks of blood in vomit which cause family and him to have alarm     Ems started a 22g in left hand and gave 4mg zofran

## 2020-07-02 NOTE — DISCHARGE INSTRUCTIONS
Patient Education      The medication prescribed for nausea and vomiting should be taken as directed. Take the other medications as directed daily. Arrange follow up with your primary oncologist.   Nausea and Vomiting: Care Instructions  Your Care Instructions     When you are nauseated, you may feel weak and sweaty and notice a lot of saliva in your mouth. Nausea often leads to vomiting. Most of the time you do not need to worry about nausea and vomiting, but they can be signs of other illnesses. Two common causes of nausea and vomiting are stomach flu and food poisoning. Nausea and vomiting from viral stomach flu will usually start to improve within 24 hours. Nausea and vomiting from food poisoning may last from 12 to 48 hours. The doctor has checked you carefully, but problems can develop later. If you notice any problems or new symptoms, get medical treatment right away. Follow-up care is a key part of your treatment and safety. Be sure to make and go to all appointments, and call your doctor if you are having problems. It's also a good idea to know your test results and keep a list of the medicines you take. How can you care for yourself at home? · To prevent dehydration, drink plenty of fluids, enough so that your urine is light yellow or clear like water. Choose water and other caffeine-free clear liquids until you feel better. If you have kidney, heart, or liver disease and have to limit fluids, talk with your doctor before you increase the amount of fluids you drink. · Rest in bed until you feel better. · When you are able to eat, try clear soups, mild foods, and liquids until all symptoms are gone for 12 to 48 hours. Other good choices include dry toast, crackers, cooked cereal, and gelatin dessert, such as Jell-O. When should you call for help? [id number] anytime you think you may need emergency care. For example, call if:  · You passed out (lost consciousness).   Call your doctor now or seek immediate medical care if:  · You have symptoms of dehydration, such as:  ? Dry eyes and a dry mouth. ? Passing only a little dark urine. ? Feeling thirstier than usual.  · You have new or worsening belly pain. · You have a new or higher fever. · You vomit blood or what looks like coffee grounds. Watch closely for changes in your health, and be sure to contact your doctor if:  · You have ongoing nausea and vomiting. · Your vomiting is getting worse. · Your vomiting lasts longer than 2 days. · You are not getting better as expected. Where can you learn more? Go to http://cyn-estephanie.info/  Enter H591 in the search box to learn more about \"Nausea and Vomiting: Care Instructions. \"  Current as of: June 26, 2019               Content Version: 12.5  © 8522-5600 Healthwise, Incorporated. Care instructions adapted under license by [employer] (which disclaims liability or warranty for this information). If you have questions about a medical condition or this instruction, always ask your healthcare professional. [place] [address] any warranty or liability for your use of this information.

## 2020-07-07 NOTE — CONSULTS
[facility] 
CONSULTATION Name:  Flavio Massey 
MR#:  [id number] :  1965 ACCOUNT #:  [de-identified] DATE OF SERVICE:  2020 ADDENDUM Mr. Maria R Newton is a 20-year-old man with diagnosis of metastatic non-small cell lung cancer to the contralateral chest wall, pancreas, and recent MRI of the brain showed at least 3 brain metastases, the largest one located in the right temporal lobe and measured 41 x 31 mm and the second one measured 21 x 19 mm located in the left occipital lobe and a third one 15 x 15 mm right frontal lobe. There is uniform enhancement 20 x 7 mm, possibly meningioma, and there is 5 mm midline shift. The patient was put on Decadron because of edema. When I saw him, I decided to treat the whole brain with 3000 cGy in 10 treatments. The advantage of that is the chance of recurring out of those tumors in the brain about 30%. Dr. Nika Graham suggested to send him to the [facility] for stereotactic radiation. The advantage of that is giving much higher dose to the tumor with stereotactic treatment will have a much better chance of controlling those 3 lesions then was conventional whole brain radiation. The disadvantage is the fact that the remaining brain tissue will show metastatic disease in 50% of cases, that means the patient will be evaluated for a second stereotactic treatment shortly after the first one. MRI of the brain needs to be done every 2 to 3 months and observe the patient closely for recurrent metastatic disease. [facility] option was discussed with the patient and he agreed to go for the stereotactic treatment for better upfront local control for those 3 lesions. Mr. Maria R Newton was scheduled to have [facility] treatment to those 3 lesions and he subsequently will be followed by MRI and if he relapses in the remaining areas of the brain, he will be retreated with another [facility]. MD MELANY [email or web address] D:  07/07/2020 10:59 T:  07/07/2020 15:08 JOB #:  [id number]

## 2020-07-08 PROBLEM — C79.31 BRAIN METASTASES (HCC): Status: ACTIVE | Noted: 2020-01-01

## 2020-07-16 NOTE — PROGRESS NOTES
Patient resolved from Transition of Care episode on 7/16/2020  Discussed COVID-19 related testing which was not done at this time. Test results were not done. Patient informed of results, if available? no     Patient/family has been provided the following resources and education related to COVID-19:                         Signs, symptoms and red flags related to COVID-19            CDC exposure and quarantine guidelines            [facility] exposure contact - [phone]            Contact for their local Department of Health                 Patient currently reports that he has no current Covid 19 related symptoms. No further outreach scheduled with this CTN/ACM/LPN/HC/ MA. Episode of Care resolved. Patient has this CTN/ACM/LPN/HC/MA contact information if future needs arise.

## 2020-07-30 NOTE — PROGRESS NOTES
7/30/20 saw pt today with Dr. Katie Fuller for pre chemo cycle 2 carbo/alimta/keytruda. Last radiation session today. He is tolerating treatment well. PO intake is good. Proceed to infusion tomorrow. Follow up in 3 weeks with NP and 6 weeks with Katie Fuller. Restaging PET prior to cycle 5. Encouraged to call with any concerns. Navigation will continue to follow.

## 2020-07-31 NOTE — PROGRESS NOTES
Arrived to the [facility]. Keytruda/Alimta/Carbo completed. Patient tolerated wel. Any issues or concerns during appointment: none. Discharged ambulatory.     Nati Balderas RN

## 2020-08-20 NOTE — PROGRESS NOTES
8/20/2020  Patient seen with Brielle Mederos NP for pre-chemo office visit. Patient denies pain, SOB, and states PO intake is adequate. Patient wishes to continue with treatment and will continue to be navigated.

## 2020-08-21 NOTE — PROGRESS NOTES
Arrived to the [facility]. Assessment completed. Labs reviewed. Patient tolerated chemotherapy well. Any issues or concerns during appointment: call placed to Kendall Bullock NP, re: clarifying to see if TSH needs checked today. She states that we do need to check the TSH. TSH results were normal today. Patient aware of next infusion appointment on 9/11/20 (date) at 0900 (time) with IV infusion center. Discharged ambulatory, per self. Patient instructed to call Dr. Caceres Ou office immediately for any problems or concerns. He verbalizes understanding.

## 2020-08-21 NOTE — PROGRESS NOTES
Patient here for chemotherapy. Reviewed the procedure and process with patient and he verbalizes understanding.

## 2020-09-10 NOTE — PROGRESS NOTES
9/10/20 saw pt today with Dr. Cheryle Beauvais for pre chemo cycle 4 carbo/alimta/keytruda. He is tolerating chemo well. PO intake is good. His son passed away a few weeks ago from cancer. Offered counseling but pt declined at this time. Restaging MRI brain and PET prior to next infusion. Infusion on 9/12. Encouraged to call with any questions or concerns. Navigation will continue to follow.

## 2020-09-12 NOTE — PROGRESS NOTES
Arrived to infusion No concerns today Chemo complete,carbo/alimta/keytruda. Tolerated well Next appt 10/3

## 2020-10-01 NOTE — PROGRESS NOTES
10/1/20 saw pt today with Dr. Alex Gray for pre chemo cycle 5 alimta/keytruda. Restaging scans reviewed with pt. Plan to continue current treatment and repeat scans in 9 weeks. PO Intake is good. Denies any issues. Infusion on Saturday. Follow up in 3 weeks. Encouraged to call with any concerns. Navigation will continue to follow.

## 2020-10-03 PROBLEM — E53.8 B12 DEFICIENCY: Status: ACTIVE | Noted: 2020-01-01

## 2020-10-03 NOTE — PROGRESS NOTES
Arrived to the [facility]. Port accessed and chemo & B12 completed. Patient tolerated well. Port flushed and de-accessed. Any issues or concerns during appointment: None. Patient aware of next infusion appointment on 10/24 (date) at 0900 (time). Discharged ambulatory in stable condition.

## 2020-10-22 NOTE — PROGRESS NOTES
10/22/20 saw pt today with Dr. Altagracia Morse for pre chemo cycle 6 alimta/keytruda. Liver function up today, will hold chemo this week. PO intake is good. Follow up in 3 weeks. Encouraged to call with any concerns. Navigation will continue to follow.

## 2020-11-10 NOTE — PROGRESS NOTES
11/10/20 saw pt today with Dr. Twila Brittle for pre chemo cycle 6 alimta/keytruda. He is reporting 3 episodes of coughing up blood, will monitor closely. He is feeling well otherwise. PO intake is good. Infusion on 11/14. Follow up in 3 weeks with repeat scans. Encouraged to call with any concerns prior to next appt. Navigation will continue to follow.

## 2020-11-13 NOTE — ADDENDUM NOTE
Encounter addended by: Chente Barraza [facility] on: 11/13/2020 4:20 PM   Actions taken: i-Vent created or edited

## 2020-11-14 NOTE — PROGRESS NOTES
Arrived to the [facility]. Assessment completed, labs reviewed. Damien Morales completed. Patient tolerated without problems. Any issues or concerns during appointment: None  Instructed to call Dr Racquel Saenz  with any side effects or concerns  Patient aware of next infusion appointment on 12/5/20 (date) at 5 AM (time).   Discharged ambulatory

## 2020-12-03 NOTE — PROGRESS NOTES
12/3/20 saw pt today with Dr. Adelaida Iglesias for pre chemo cycle 7 alimta/keytruda. He is tolerating treatment well. PO intake is good. He is having some delayed N/V. Will start reglan. Instructed to not take any compazine while on reglan. Infusion on 12/5. Follow up in 3 weeks with restaging scan. Encouraged to call with any concerns. Navigation will continue to follow.

## 2020-12-04 NOTE — ADDENDUM NOTE
Encounter addended by: Debra Olivares on: 12/4/2020 1:07 PM   Actions taken: i-Vent created or edited

## 2020-12-05 NOTE — PROGRESS NOTES
Arrived to the [facility]. Keytruda and Alimta infusion completed. Patient tolerated well. Any issues or concerns during appointment: NO.  Patient aware of next infusion appointment on 12/26/2020  at 0900 . Discharged ambulatory.

## 2020-12-26 NOTE — PROGRESS NOTES
Arrived to the [facility]. Gisela Cadena completed. Patient tolerated well Any issues or concerns during appointment: No 
Patient aware of next infusion appointment on Saturday,January 16,2021 @ 0900 Discharged home ambulatory

## 2021-01-01 ENCOUNTER — HOSPITAL ENCOUNTER (EMERGENCY)
Age: 56
Discharge: HOME | End: 2021-02-15
Attending: EMERGENCY MEDICINE
Payer: MEDICAID

## 2021-01-01 ENCOUNTER — PATIENT OUTREACH (OUTPATIENT)
Dept: CASE MANAGEMENT | Age: 56
End: 2021-01-01

## 2021-01-01 ENCOUNTER — HOSPITAL ENCOUNTER (OUTPATIENT)
Dept: INFUSION THERAPY | Age: 56
Discharge: HOME | End: 2021-04-03
Payer: MEDICAID

## 2021-01-01 ENCOUNTER — HOSPITAL ENCOUNTER (OUTPATIENT)
Dept: INFUSION THERAPY | Age: 56
Discharge: HOME | End: 2021-01-16
Payer: MEDICAID

## 2021-01-01 ENCOUNTER — HOSPITAL ENCOUNTER (EMERGENCY)
Age: 56
Discharge: HOME | End: 2021-02-21
Attending: EMERGENCY MEDICINE
Payer: MEDICAID

## 2021-01-01 ENCOUNTER — HOSPITAL ENCOUNTER (OUTPATIENT)
Dept: LAB | Age: 56
Discharge: HOME | End: 2021-03-04
Payer: MEDICAID

## 2021-01-01 ENCOUNTER — HOSPITAL ENCOUNTER (EMERGENCY)
Age: 56
Discharge: HOME | End: 2021-02-12
Payer: MEDICAID

## 2021-01-01 ENCOUNTER — APPOINTMENT (OUTPATIENT)
Dept: CT IMAGING | Age: 56
End: 2021-01-01
Payer: MEDICAID

## 2021-01-01 ENCOUNTER — APPOINTMENT (OUTPATIENT)
Dept: GENERAL RADIOLOGY | Age: 56
End: 2021-01-01
Attending: EMERGENCY MEDICINE
Payer: MEDICAID

## 2021-01-01 ENCOUNTER — APPOINTMENT (OUTPATIENT)
Dept: GENERAL RADIOLOGY | Age: 56
DRG: 720 | End: 2021-01-01
Attending: EMERGENCY MEDICINE
Payer: MEDICAID

## 2021-01-01 ENCOUNTER — HOSPITAL ENCOUNTER (OUTPATIENT)
Dept: LAB | Age: 56
Discharge: HOME | End: 2021-02-04
Payer: MEDICAID

## 2021-01-01 ENCOUNTER — HOSPITAL ENCOUNTER (OUTPATIENT)
Dept: LAB | Age: 56
Discharge: HOME | End: 2021-01-14
Payer: MEDICAID

## 2021-01-01 ENCOUNTER — APPOINTMENT (OUTPATIENT)
Dept: GENERAL RADIOLOGY | Age: 56
DRG: 720 | End: 2021-01-01
Attending: FAMILY MEDICINE
Payer: MEDICAID

## 2021-01-01 ENCOUNTER — HOSPITAL ENCOUNTER (EMERGENCY)
Age: 56
Discharge: STILL ADMITTED OR ADMINISTRATIVE | DRG: 720 | End: 2021-04-13
Attending: EMERGENCY MEDICINE
Payer: MEDICAID

## 2021-01-01 ENCOUNTER — HOSPITAL ENCOUNTER (OUTPATIENT)
Dept: INFUSION THERAPY | Age: 56
Discharge: HOME | End: 2021-02-06
Payer: MEDICAID

## 2021-01-01 ENCOUNTER — HOSPITAL ENCOUNTER (OUTPATIENT)
Dept: INFUSION THERAPY | Age: 56
Discharge: HOME | End: 2021-03-06
Payer: MEDICAID

## 2021-01-01 ENCOUNTER — HOSPITAL ENCOUNTER (OUTPATIENT)
Dept: INFUSION THERAPY | Age: 56
End: 2021-01-01

## 2021-01-01 ENCOUNTER — HOSPITAL ENCOUNTER (INPATIENT)
Age: 56
LOS: 2 days | DRG: 720 | End: 2021-04-15
Attending: INTERNAL MEDICINE | Admitting: FAMILY MEDICINE
Payer: MEDICAID

## 2021-01-01 ENCOUNTER — HOSPITAL ENCOUNTER (OUTPATIENT)
Dept: LAB | Age: 56
Discharge: HOME | End: 2021-02-24
Payer: MEDICAID

## 2021-01-01 ENCOUNTER — HOSPITAL ENCOUNTER (OUTPATIENT)
Dept: LAB | Age: 56
Discharge: HOME | End: 2021-04-01
Payer: MEDICAID

## 2021-01-01 VITALS
WEIGHT: 229 LBS | HEART RATE: 71 BPM | DIASTOLIC BLOOD PRESSURE: 63 MMHG | BODY MASS INDEX: 32.06 KG/M2 | TEMPERATURE: 97.8 F | OXYGEN SATURATION: 100 % | RESPIRATION RATE: 20 BRPM | SYSTOLIC BLOOD PRESSURE: 145 MMHG | HEIGHT: 71 IN

## 2021-01-01 VITALS
BODY MASS INDEX: 31.16 KG/M2 | WEIGHT: 223.4 LBS | SYSTOLIC BLOOD PRESSURE: 167 MMHG | DIASTOLIC BLOOD PRESSURE: 88 MMHG | TEMPERATURE: 97.4 F | RESPIRATION RATE: 18 BRPM | HEART RATE: 54 BPM | OXYGEN SATURATION: 98 %

## 2021-01-01 VITALS
DIASTOLIC BLOOD PRESSURE: 78 MMHG | HEART RATE: 63 BPM | SYSTOLIC BLOOD PRESSURE: 148 MMHG | BODY MASS INDEX: 31.38 KG/M2 | TEMPERATURE: 97.4 F | OXYGEN SATURATION: 100 % | RESPIRATION RATE: 18 BRPM | WEIGHT: 225 LBS

## 2021-01-01 VITALS
OXYGEN SATURATION: 97 % | WEIGHT: 232.2 LBS | RESPIRATION RATE: 18 BRPM | TEMPERATURE: 97.8 F | DIASTOLIC BLOOD PRESSURE: 85 MMHG | SYSTOLIC BLOOD PRESSURE: 145 MMHG | HEART RATE: 63 BPM | BODY MASS INDEX: 32.39 KG/M2

## 2021-01-01 VITALS
BODY MASS INDEX: 32.06 KG/M2 | DIASTOLIC BLOOD PRESSURE: 67 MMHG | HEART RATE: 95 BPM | TEMPERATURE: 97.6 F | HEIGHT: 71 IN | WEIGHT: 229 LBS | RESPIRATION RATE: 20 BRPM | SYSTOLIC BLOOD PRESSURE: 120 MMHG | OXYGEN SATURATION: 100 %

## 2021-01-01 VITALS
HEART RATE: 87 BPM | HEIGHT: 71 IN | BODY MASS INDEX: 32.2 KG/M2 | OXYGEN SATURATION: 99 % | WEIGHT: 230 LBS | TEMPERATURE: 98.2 F | DIASTOLIC BLOOD PRESSURE: 71 MMHG | RESPIRATION RATE: 16 BRPM | SYSTOLIC BLOOD PRESSURE: 122 MMHG

## 2021-01-01 VITALS
WEIGHT: 247.8 LBS | RESPIRATION RATE: 18 BRPM | SYSTOLIC BLOOD PRESSURE: 142 MMHG | HEART RATE: 78 BPM | HEIGHT: 71 IN | BODY MASS INDEX: 34.69 KG/M2 | OXYGEN SATURATION: 95 % | TEMPERATURE: 97.6 F | DIASTOLIC BLOOD PRESSURE: 77 MMHG

## 2021-01-01 VITALS
TEMPERATURE: 99.7 F | DIASTOLIC BLOOD PRESSURE: 47 MMHG | WEIGHT: 278.44 LBS | BODY MASS INDEX: 38.98 KG/M2 | SYSTOLIC BLOOD PRESSURE: 66 MMHG | HEIGHT: 71 IN | OXYGEN SATURATION: 100 %

## 2021-01-01 VITALS
TEMPERATURE: 98.3 F | RESPIRATION RATE: 23 BRPM | WEIGHT: 219 LBS | HEIGHT: 71 IN | HEART RATE: 109 BPM | SYSTOLIC BLOOD PRESSURE: 105 MMHG | OXYGEN SATURATION: 95 % | DIASTOLIC BLOOD PRESSURE: 55 MMHG | BODY MASS INDEX: 30.66 KG/M2

## 2021-01-01 VITALS
DIASTOLIC BLOOD PRESSURE: 61 MMHG | BODY MASS INDEX: 31.99 KG/M2 | HEART RATE: 65 BPM | TEMPERATURE: 97 F | SYSTOLIC BLOOD PRESSURE: 108 MMHG | RESPIRATION RATE: 18 BRPM | OXYGEN SATURATION: 99 % | WEIGHT: 229.4 LBS

## 2021-01-01 DIAGNOSIS — R65.21 SEPTIC SHOCK (HCC): ICD-10-CM

## 2021-01-01 DIAGNOSIS — C78.89 METASTASIS TO PANCREAS OF UNKNOWN ORIGIN (HCC): ICD-10-CM

## 2021-01-01 DIAGNOSIS — C34.82 MALIGNANT NEOPLASM OF OVERLAPPING SITES OF LEFT LUNG (HCC): Primary | ICD-10-CM

## 2021-01-01 DIAGNOSIS — E53.8 B12 DEFICIENCY: Primary | ICD-10-CM

## 2021-01-01 DIAGNOSIS — J20.9 ACUTE BRONCHITIS, UNSPECIFIED ORGANISM: Primary | ICD-10-CM

## 2021-01-01 DIAGNOSIS — C34.92 MALIGNANT NEOPLASM OF LEFT LUNG, UNSPECIFIED PART OF LUNG (HCC): ICD-10-CM

## 2021-01-01 DIAGNOSIS — R06.09 EXERTIONAL DYSPNEA: Primary | ICD-10-CM

## 2021-01-01 DIAGNOSIS — E53.8 B12 DEFICIENCY: ICD-10-CM

## 2021-01-01 DIAGNOSIS — Z79.899 HIGH RISK MEDICATION USE: ICD-10-CM

## 2021-01-01 DIAGNOSIS — J96.01 ACUTE RESPIRATORY FAILURE WITH HYPOXIA (HCC): ICD-10-CM

## 2021-01-01 DIAGNOSIS — Z71.89 ACP (ADVANCE CARE PLANNING): ICD-10-CM

## 2021-01-01 DIAGNOSIS — D61.818 PANCYTOPENIA (HCC): ICD-10-CM

## 2021-01-01 DIAGNOSIS — C34.82 MALIGNANT NEOPLASM OF OVERLAPPING SITES OF LEFT LUNG (HCC): ICD-10-CM

## 2021-01-01 DIAGNOSIS — E87.1 HYPONATREMIA: ICD-10-CM

## 2021-01-01 DIAGNOSIS — C79.31 BRAIN METASTASES (HCC): ICD-10-CM

## 2021-01-01 DIAGNOSIS — Z51.5 ENCOUNTER FOR PALLIATIVE CARE: ICD-10-CM

## 2021-01-01 DIAGNOSIS — R53.83 FATIGUE, UNSPECIFIED TYPE: ICD-10-CM

## 2021-01-01 DIAGNOSIS — R06.09 DOE (DYSPNEA ON EXERTION): ICD-10-CM

## 2021-01-01 DIAGNOSIS — J18.9 COMMUNITY ACQUIRED PNEUMONIA OF RIGHT LUNG, UNSPECIFIED PART OF LUNG: ICD-10-CM

## 2021-01-01 DIAGNOSIS — J43.2 CENTRILOBULAR EMPHYSEMA (HCC): Chronic | ICD-10-CM

## 2021-01-01 DIAGNOSIS — J18.9 PNEUMONIA OF LEFT LOWER LOBE DUE TO INFECTIOUS ORGANISM: Primary | ICD-10-CM

## 2021-01-01 DIAGNOSIS — C80.1 METASTASIS TO PANCREAS OF UNKNOWN ORIGIN (HCC): ICD-10-CM

## 2021-01-01 DIAGNOSIS — A41.9 SEPTIC SHOCK (HCC): ICD-10-CM

## 2021-01-01 DIAGNOSIS — R06.00 DYSPNEA, UNSPECIFIED TYPE: ICD-10-CM

## 2021-01-01 LAB
ALBUMIN SERPL-MCNC: 3.3 G/DL (ref 3.5–5)
ALBUMIN SERPL-MCNC: 3.5 G/DL (ref 3.5–5)
ALBUMIN SERPL-MCNC: 3.5 G/DL (ref 3.5–5)
ALBUMIN SERPL-MCNC: 3.6 G/DL (ref 3.5–5)
ALBUMIN SERPL-MCNC: 3.6 G/DL (ref 3.5–5)
ALBUMIN SERPL-MCNC: 3.7 G/DL (ref 3.5–5)
ALBUMIN SERPL-MCNC: 3.7 G/DL (ref 3.5–5)
ALBUMIN SERPL-MCNC: 3.9 G/DL (ref 3.5–5)
ALBUMIN SERPL-MCNC: 4 G/DL (ref 3.5–5)
ALBUMIN/GLOB SERPL: 0.8 {RATIO} (ref 1.2–3.5)
ALBUMIN/GLOB SERPL: 0.8 {RATIO} (ref 1.2–3.5)
ALBUMIN/GLOB SERPL: 0.9 {RATIO} (ref 1.2–3.5)
ALBUMIN/GLOB SERPL: 1 {RATIO} (ref 1.2–3.5)
ALBUMIN/GLOB SERPL: 1.1 {RATIO} (ref 1.2–3.5)
ALBUMIN/GLOB SERPL: 1.2 {RATIO} (ref 1.2–3.5)
ALP SERPL-CCNC: 111 U/L (ref 50–136)
ALP SERPL-CCNC: 119 U/L (ref 50–136)
ALP SERPL-CCNC: 131 U/L (ref 50–136)
ALP SERPL-CCNC: 137 U/L (ref 50–136)
ALP SERPL-CCNC: 144 U/L (ref 50–136)
ALP SERPL-CCNC: 161 U/L (ref 50–136)
ALP SERPL-CCNC: 173 U/L (ref 50–136)
ALP SERPL-CCNC: 174 U/L (ref 50–136)
ALP SERPL-CCNC: 178 U/L (ref 50–136)
ALT SERPL-CCNC: 101 U/L (ref 12–65)
ALT SERPL-CCNC: 114 U/L (ref 12–65)
ALT SERPL-CCNC: 153 U/L (ref 12–65)
ALT SERPL-CCNC: 69 U/L (ref 12–65)
ALT SERPL-CCNC: 83 U/L (ref 12–65)
ALT SERPL-CCNC: 90 U/L (ref 12–65)
ALT SERPL-CCNC: 96 U/L (ref 12–65)
ANION GAP SERPL CALC-SCNC: 10 MMOL/L (ref 7–16)
ANION GAP SERPL CALC-SCNC: 10 MMOL/L (ref 7–16)
ANION GAP SERPL CALC-SCNC: 11 MMOL/L (ref 7–16)
ANION GAP SERPL CALC-SCNC: 12 MMOL/L (ref 7–16)
ANION GAP SERPL CALC-SCNC: 14 MMOL/L (ref 7–16)
ANION GAP SERPL CALC-SCNC: 19 MMOL/L (ref 7–16)
ANION GAP SERPL CALC-SCNC: 5 MMOL/L (ref 7–16)
ANION GAP SERPL CALC-SCNC: 7 MMOL/L (ref 7–16)
ANION GAP SERPL CALC-SCNC: 8 MMOL/L (ref 7–16)
ANION GAP SERPL CALC-SCNC: 9 MMOL/L (ref 7–16)
ARTERIAL PATENCY WRIST A: ABNORMAL
ARTERIAL PATENCY WRIST A: NEGATIVE
ARTERIAL PATENCY WRIST A: POSITIVE
AST SERPL-CCNC: 34 U/L (ref 15–37)
AST SERPL-CCNC: 35 U/L (ref 15–37)
AST SERPL-CCNC: 36 U/L (ref 15–37)
AST SERPL-CCNC: 37 U/L (ref 15–37)
AST SERPL-CCNC: 40 U/L (ref 15–37)
AST SERPL-CCNC: 45 U/L (ref 15–37)
AST SERPL-CCNC: 57 U/L (ref 15–37)
AST SERPL-CCNC: 62 U/L (ref 15–37)
AST SERPL-CCNC: 72 U/L (ref 15–37)
ATRIAL RATE: 107 BPM
ATRIAL RATE: 67 BPM
ATRIAL RATE: 75 BPM
BASE DEFICIT BLD-SCNC: 1 MMOL/L
BASE DEFICIT BLD-SCNC: 10.5 MMOL/L
BASE DEFICIT BLD-SCNC: 5.4 MMOL/L
BASE DEFICIT BLD-SCNC: 6.6 MMOL/L
BASE DEFICIT BLD-SCNC: 6.7 MMOL/L
BASOPHILS # BLD: 0 K/UL (ref 0–0.2)
BASOPHILS # BLD: 0.1 K/UL (ref 0–0.2)
BASOPHILS # BLD: 0.2 K/UL (ref 0–0.2)
BASOPHILS NFR BLD: 0 % (ref 0–2)
BASOPHILS NFR BLD: 1 % (ref 0–2)
BASOPHILS NFR BLD: 2 % (ref 0–2)
BASOPHILS NFR BLD: 2 % (ref 0–2)
BDY SITE: ABNORMAL
BILIRUB SERPL-MCNC: 0.4 MG/DL (ref 0.2–1.1)
BILIRUB SERPL-MCNC: 0.4 MG/DL (ref 0.2–1.1)
BILIRUB SERPL-MCNC: 0.5 MG/DL (ref 0.2–1.1)
BILIRUB SERPL-MCNC: 0.6 MG/DL (ref 0.2–1.1)
BILIRUB SERPL-MCNC: 0.7 MG/DL (ref 0.2–1.1)
BILIRUB SERPL-MCNC: 1 MG/DL (ref 0.2–1.1)
BILIRUB SERPL-MCNC: 1.4 MG/DL (ref 0.2–1.1)
BNP SERPL-MCNC: 48 PG/ML (ref 5–125)
BNP SERPL-MCNC: 93 PG/ML (ref 5–125)
BUN SERPL-MCNC: 15 MG/DL (ref 6–23)
BUN SERPL-MCNC: 17 MG/DL (ref 6–23)
BUN SERPL-MCNC: 19 MG/DL (ref 6–23)
BUN SERPL-MCNC: 20 MG/DL (ref 6–23)
BUN SERPL-MCNC: 20 MG/DL (ref 6–23)
BUN SERPL-MCNC: 21 MG/DL (ref 6–23)
BUN SERPL-MCNC: 23 MG/DL (ref 6–23)
BUN SERPL-MCNC: 26 MG/DL (ref 6–23)
BUN SERPL-MCNC: 28 MG/DL (ref 6–23)
BUN SERPL-MCNC: 32 MG/DL (ref 6–23)
BUN SERPL-MCNC: 42 MG/DL (ref 6–23)
BUN SERPL-MCNC: 65 MG/DL (ref 6–23)
CALCIUM SERPL-MCNC: 6.5 MG/DL (ref 8.3–10.4)
CALCIUM SERPL-MCNC: 7.4 MG/DL (ref 8.3–10.4)
CALCIUM SERPL-MCNC: 7.6 MG/DL (ref 8.3–10.4)
CALCIUM SERPL-MCNC: 9 MG/DL (ref 8.3–10.4)
CALCIUM SERPL-MCNC: 9 MG/DL (ref 8.3–10.4)
CALCIUM SERPL-MCNC: 9.1 MG/DL (ref 8.3–10.4)
CALCIUM SERPL-MCNC: 9.2 MG/DL (ref 8.3–10.4)
CALCIUM SERPL-MCNC: 9.4 MG/DL (ref 8.3–10.4)
CALCIUM SERPL-MCNC: 9.5 MG/DL (ref 8.3–10.4)
CALCIUM SERPL-MCNC: 9.5 MG/DL (ref 8.3–10.4)
CALCIUM SERPL-MCNC: 9.6 MG/DL (ref 8.3–10.4)
CALCIUM SERPL-MCNC: 9.6 MG/DL (ref 8.3–10.4)
CALCULATED P AXIS, ECG09: 44 DEGREES
CALCULATED P AXIS, ECG09: 45 DEGREES
CALCULATED P AXIS, ECG09: 46 DEGREES
CALCULATED R AXIS, ECG10: -57 DEGREES
CALCULATED R AXIS, ECG10: -62 DEGREES
CALCULATED R AXIS, ECG10: -63 DEGREES
CALCULATED T AXIS, ECG11: 44 DEGREES
CALCULATED T AXIS, ECG11: 45 DEGREES
CALCULATED T AXIS, ECG11: 52 DEGREES
CHLORIDE SERPL-SCNC: 100 MMOL/L (ref 98–107)
CHLORIDE SERPL-SCNC: 100 MMOL/L (ref 98–107)
CHLORIDE SERPL-SCNC: 101 MMOL/L (ref 98–107)
CHLORIDE SERPL-SCNC: 102 MMOL/L (ref 98–107)
CHLORIDE SERPL-SCNC: 104 MMOL/L (ref 98–107)
CHLORIDE SERPL-SCNC: 95 MMOL/L (ref 98–107)
CHLORIDE SERPL-SCNC: 96 MMOL/L (ref 98–107)
CHLORIDE SERPL-SCNC: 98 MMOL/L (ref 98–107)
CHLORIDE SERPL-SCNC: 99 MMOL/L (ref 98–107)
CHLORIDE SERPL-SCNC: 99 MMOL/L (ref 98–107)
CO2 BLD-SCNC: 18 MMOL/L (ref 13–23)
CO2 BLD-SCNC: 19 MMOL/L (ref 13–23)
CO2 BLD-SCNC: 20 MMOL/L (ref 13–23)
CO2 SERPL-SCNC: 13 MMOL/L (ref 21–32)
CO2 SERPL-SCNC: 16 MMOL/L (ref 21–32)
CO2 SERPL-SCNC: 20 MMOL/L (ref 21–32)
CO2 SERPL-SCNC: 23 MMOL/L (ref 21–32)
CO2 SERPL-SCNC: 24 MMOL/L (ref 21–32)
CO2 SERPL-SCNC: 24 MMOL/L (ref 21–32)
CO2 SERPL-SCNC: 25 MMOL/L (ref 21–32)
CO2 SERPL-SCNC: 26 MMOL/L (ref 21–32)
CO2 SERPL-SCNC: 27 MMOL/L (ref 21–32)
CO2 SERPL-SCNC: 29 MMOL/L (ref 21–32)
COVID-19 RAPID TEST, COVR: NOT DETECTED
COVID-19 RAPID TEST, COVR: NOT DETECTED
CREAT SERPL-MCNC: 1.49 MG/DL (ref 0.8–1.5)
CREAT SERPL-MCNC: 1.49 MG/DL (ref 0.8–1.5)
CREAT SERPL-MCNC: 1.5 MG/DL (ref 0.8–1.5)
CREAT SERPL-MCNC: 1.6 MG/DL (ref 0.8–1.5)
CREAT SERPL-MCNC: 1.6 MG/DL (ref 0.8–1.5)
CREAT SERPL-MCNC: 1.62 MG/DL (ref 0.8–1.5)
CREAT SERPL-MCNC: 1.64 MG/DL (ref 0.8–1.5)
CREAT SERPL-MCNC: 1.8 MG/DL (ref 0.8–1.5)
CREAT SERPL-MCNC: 1.9 MG/DL (ref 0.8–1.5)
CREAT SERPL-MCNC: 2.62 MG/DL (ref 0.8–1.5)
CREAT SERPL-MCNC: 3.29 MG/DL (ref 0.8–1.5)
CREAT SERPL-MCNC: 5.1 MG/DL (ref 0.8–1.5)
DIAGNOSIS, 93000: NORMAL
DIFFERENTIAL METHOD BLD: ABNORMAL
EOSINOPHIL # BLD: 0 K/UL (ref 0–0.8)
EOSINOPHIL # BLD: 0.1 K/UL (ref 0–0.8)
EOSINOPHIL # BLD: 0.1 K/UL (ref 0–0.8)
EOSINOPHIL NFR BLD: 0 % (ref 0.5–7.8)
EOSINOPHIL NFR BLD: 1 % (ref 0.5–7.8)
EOSINOPHIL NFR BLD: 2 % (ref 0.5–7.8)
ERYTHROCYTE [DISTWIDTH] IN BLOOD BY AUTOMATED COUNT: 14.3 % (ref 11.9–14.6)
ERYTHROCYTE [DISTWIDTH] IN BLOOD BY AUTOMATED COUNT: 14.5 % (ref 11.9–14.6)
ERYTHROCYTE [DISTWIDTH] IN BLOOD BY AUTOMATED COUNT: 14.6 % (ref 11.9–14.6)
ERYTHROCYTE [DISTWIDTH] IN BLOOD BY AUTOMATED COUNT: 14.8 % (ref 11.9–14.6)
ERYTHROCYTE [DISTWIDTH] IN BLOOD BY AUTOMATED COUNT: 17.1 % (ref 11.9–14.6)
ERYTHROCYTE [DISTWIDTH] IN BLOOD BY AUTOMATED COUNT: 17.7 % (ref 11.9–14.6)
ERYTHROCYTE [DISTWIDTH] IN BLOOD BY AUTOMATED COUNT: 17.7 % (ref 11.9–14.6)
ERYTHROCYTE [DISTWIDTH] IN BLOOD BY AUTOMATED COUNT: 18.9 % (ref 11.9–14.6)
ERYTHROCYTE [DISTWIDTH] IN BLOOD BY AUTOMATED COUNT: 19 % (ref 11.9–14.6)
ERYTHROCYTE [DISTWIDTH] IN BLOOD BY AUTOMATED COUNT: 19.9 % (ref 11.9–14.6)
ERYTHROCYTE [DISTWIDTH] IN BLOOD BY AUTOMATED COUNT: 20.6 % (ref 11.9–14.6)
ERYTHROCYTE [DISTWIDTH] IN BLOOD BY AUTOMATED COUNT: 21.2 % (ref 11.9–14.6)
GAS FLOW.O2 O2 DELIVERY SYS: ABNORMAL L/MIN
GLOBULIN SER CALC-MCNC: 3.2 G/DL (ref 2.3–3.5)
GLOBULIN SER CALC-MCNC: 3.4 G/DL (ref 2.3–3.5)
GLOBULIN SER CALC-MCNC: 3.9 G/DL (ref 2.3–3.5)
GLOBULIN SER CALC-MCNC: 4 G/DL (ref 2.3–3.5)
GLOBULIN SER CALC-MCNC: 4.1 G/DL (ref 2.3–3.5)
GLOBULIN SER CALC-MCNC: 4.2 G/DL (ref 2.3–3.5)
GLOBULIN SER CALC-MCNC: 4.3 G/DL (ref 2.3–3.5)
GLOBULIN SER CALC-MCNC: 4.4 G/DL (ref 2.3–3.5)
GLOBULIN SER CALC-MCNC: 4.4 G/DL (ref 2.3–3.5)
GLUCOSE BLD STRIP.AUTO-MCNC: 110 MG/DL (ref 65–100)
GLUCOSE BLD STRIP.AUTO-MCNC: 18 MG/DL (ref 65–100)
GLUCOSE BLD STRIP.AUTO-MCNC: 43 MG/DL (ref 65–100)
GLUCOSE BLD STRIP.AUTO-MCNC: 65 MG/DL (ref 65–100)
GLUCOSE BLD STRIP.AUTO-MCNC: 66 MG/DL (ref 65–100)
GLUCOSE BLD STRIP.AUTO-MCNC: 72 MG/DL (ref 65–100)
GLUCOSE BLD STRIP.AUTO-MCNC: 84 MG/DL (ref 65–100)
GLUCOSE BLD STRIP.AUTO-MCNC: 98 MG/DL (ref 65–100)
GLUCOSE SERPL-MCNC: 100 MG/DL (ref 65–100)
GLUCOSE SERPL-MCNC: 101 MG/DL (ref 65–100)
GLUCOSE SERPL-MCNC: 108 MG/DL (ref 65–100)
GLUCOSE SERPL-MCNC: 110 MG/DL (ref 65–100)
GLUCOSE SERPL-MCNC: 111 MG/DL (ref 65–100)
GLUCOSE SERPL-MCNC: 122 MG/DL (ref 65–100)
GLUCOSE SERPL-MCNC: 127 MG/DL (ref 65–100)
GLUCOSE SERPL-MCNC: 135 MG/DL (ref 65–100)
GLUCOSE SERPL-MCNC: 139 MG/DL (ref 65–100)
GLUCOSE SERPL-MCNC: 53 MG/DL (ref 65–100)
GLUCOSE SERPL-MCNC: 63 MG/DL (ref 65–100)
GLUCOSE SERPL-MCNC: 92 MG/DL (ref 65–100)
HCO3 BLD-SCNC: 16.9 MMOL/L (ref 22–26)
HCO3 BLD-SCNC: 18.6 MMOL/L (ref 22–26)
HCO3 BLD-SCNC: 19.1 MMOL/L (ref 22–26)
HCO3 BLD-SCNC: 20.2 MMOL/L (ref 22–26)
HCO3 BLD-SCNC: 23 MMOL/L (ref 22–26)
HCT VFR BLD AUTO: 23.6 % (ref 41.1–50.3)
HCT VFR BLD AUTO: 24.4 % (ref 41.1–50.3)
HCT VFR BLD AUTO: 24.5 % (ref 41.1–50.3)
HCT VFR BLD AUTO: 25.1 % (ref 41.1–50.3)
HCT VFR BLD AUTO: 27.2 % (ref 41.1–50.3)
HCT VFR BLD AUTO: 27.9 % (ref 41.1–50.3)
HCT VFR BLD AUTO: 28.5 % (ref 41.1–50.3)
HCT VFR BLD AUTO: 28.7 % (ref 41.1–50.3)
HCT VFR BLD AUTO: 28.7 % (ref 41.1–50.3)
HCT VFR BLD AUTO: 29.1 % (ref 41.1–50.3)
HCT VFR BLD AUTO: 29.6 %
HCT VFR BLD AUTO: 30.9 % (ref 41.1–50.3)
HGB BLD-MCNC: 10 G/DL (ref 13.6–17.2)
HGB BLD-MCNC: 7.7 G/DL (ref 13.6–17.2)
HGB BLD-MCNC: 8.1 G/DL (ref 13.6–17.2)
HGB BLD-MCNC: 8.2 G/DL (ref 13.6–17.2)
HGB BLD-MCNC: 8.4 G/DL (ref 13.6–17.2)
HGB BLD-MCNC: 9.1 G/DL (ref 13.6–17.2)
HGB BLD-MCNC: 9.2 G/DL (ref 13.6–17.2)
HGB BLD-MCNC: 9.3 G/DL (ref 13.6–17.2)
HGB BLD-MCNC: 9.4 G/DL (ref 13.6–17.2)
HGB BLD-MCNC: 9.5 G/DL (ref 13.6–17.2)
HGB BLD-MCNC: 9.6 G/DL (ref 13.6–17.2)
HGB BLD-MCNC: 9.7 G/DL (ref 13.6–17.2)
IMM GRANULOCYTES # BLD AUTO: 0 K/UL (ref 0–0.5)
IMM GRANULOCYTES # BLD AUTO: 0.4 K/UL (ref 0–0.5)
IMM GRANULOCYTES # BLD AUTO: 0.4 K/UL (ref 0–0.5)
IMM GRANULOCYTES # BLD AUTO: 0.5 K/UL (ref 0–0.5)
IMM GRANULOCYTES # BLD AUTO: 0.7 K/UL (ref 0–0.5)
IMM GRANULOCYTES # BLD AUTO: 1.2 K/UL (ref 0–0.5)
IMM GRANULOCYTES # BLD AUTO: 1.3 K/UL (ref 0–0.5)
IMM GRANULOCYTES # BLD AUTO: 1.5 K/UL (ref 0–0.5)
IMM GRANULOCYTES NFR BLD AUTO: 0 % (ref 0–5)
IMM GRANULOCYTES NFR BLD AUTO: 1 % (ref 0–5)
IMM GRANULOCYTES NFR BLD AUTO: 11 % (ref 0–5)
IMM GRANULOCYTES NFR BLD AUTO: 12 % (ref 0–5)
IMM GRANULOCYTES NFR BLD AUTO: 16 % (ref 0–5)
IMM GRANULOCYTES NFR BLD AUTO: 2 % (ref 0–5)
IMM GRANULOCYTES NFR BLD AUTO: 3 % (ref 0–5)
IMM GRANULOCYTES NFR BLD AUTO: 3 % (ref 0–5)
IMM GRANULOCYTES NFR BLD AUTO: 4 % (ref 0–5)
IMM GRANULOCYTES NFR BLD AUTO: 6 % (ref 0–5)
IMM GRANULOCYTES NFR BLD AUTO: 9 % (ref 0–5)
INSPIRATION.DURATION SETTING TIME VENT: 0.9 SEC
LACTATE SERPL-SCNC: 2 MMOL/L (ref 0.4–2)
LACTATE SERPL-SCNC: 5.6 MMOL/L (ref 0.4–2)
LYMPHOCYTES # BLD: 0.3 K/UL (ref 0.5–4.6)
LYMPHOCYTES # BLD: 0.4 K/UL (ref 0.5–4.6)
LYMPHOCYTES # BLD: 0.5 K/UL (ref 0.5–4.6)
LYMPHOCYTES # BLD: 0.6 K/UL (ref 0.5–4.6)
LYMPHOCYTES # BLD: 0.8 K/UL (ref 0.5–4.6)
LYMPHOCYTES # BLD: 0.9 K/UL (ref 0.5–4.6)
LYMPHOCYTES # BLD: 1.1 K/UL (ref 0.5–4.6)
LYMPHOCYTES # BLD: 2.6 K/UL (ref 0.5–4.6)
LYMPHOCYTES # BLD: 2.8 K/UL (ref 0.5–4.6)
LYMPHOCYTES NFR BLD: 10 % (ref 13–44)
LYMPHOCYTES NFR BLD: 13 % (ref 13–44)
LYMPHOCYTES NFR BLD: 14 % (ref 13–44)
LYMPHOCYTES NFR BLD: 22 % (ref 13–44)
LYMPHOCYTES NFR BLD: 24 % (ref 13–44)
LYMPHOCYTES NFR BLD: 26 % (ref 13–44)
LYMPHOCYTES NFR BLD: 46 % (ref 13–44)
LYMPHOCYTES NFR BLD: 49 % (ref 13–44)
LYMPHOCYTES NFR BLD: 6 % (ref 13–44)
LYMPHOCYTES NFR BLD: 7 % (ref 13–44)
LYMPHOCYTES NFR BLD: 7 % (ref 13–44)
MAGNESIUM SERPL-MCNC: 1.6 MG/DL (ref 1.8–2.4)
MCH RBC QN AUTO: 32.7 PG (ref 26.1–32.9)
MCH RBC QN AUTO: 33.4 PG (ref 26.1–32.9)
MCH RBC QN AUTO: 34.3 PG (ref 26.1–32.9)
MCH RBC QN AUTO: 34.5 PG (ref 26.1–32.9)
MCH RBC QN AUTO: 35 PG (ref 26.1–32.9)
MCH RBC QN AUTO: 35 PG (ref 26.1–32.9)
MCH RBC QN AUTO: 35.2 PG (ref 26.1–32.9)
MCH RBC QN AUTO: 35.4 PG (ref 26.1–32.9)
MCH RBC QN AUTO: 35.5 PG (ref 26.1–32.9)
MCH RBC QN AUTO: 35.6 PG (ref 26.1–32.9)
MCH RBC QN AUTO: 36.1 PG (ref 26.1–32.9)
MCH RBC QN AUTO: 36.3 PG (ref 26.1–32.9)
MCHC RBC AUTO-ENTMCNC: 32.1 G/DL (ref 31.4–35)
MCHC RBC AUTO-ENTMCNC: 32.4 G/DL (ref 31.4–35)
MCHC RBC AUTO-ENTMCNC: 32.6 G/DL (ref 31.4–35)
MCHC RBC AUTO-ENTMCNC: 32.6 G/DL (ref 31.4–35)
MCHC RBC AUTO-ENTMCNC: 32.8 G/DL (ref 31.4–35)
MCHC RBC AUTO-ENTMCNC: 33 G/DL (ref 31.4–35)
MCHC RBC AUTO-ENTMCNC: 33.1 G/DL (ref 31.4–35)
MCHC RBC AUTO-ENTMCNC: 33.1 G/DL (ref 31.4–35)
MCHC RBC AUTO-ENTMCNC: 33.5 G/DL (ref 31.4–35)
MCHC RBC AUTO-ENTMCNC: 33.5 G/DL (ref 31.4–35)
MCHC RBC AUTO-ENTMCNC: 33.6 G/DL (ref 31.4–35)
MCHC RBC AUTO-ENTMCNC: 33.7 G/DL (ref 31.4–35)
MCV RBC AUTO: 101 FL (ref 79.6–97.8)
MCV RBC AUTO: 101.4 FL (ref 79.6–97.8)
MCV RBC AUTO: 102.1 FL (ref 79.6–97.8)
MCV RBC AUTO: 103 FL (ref 79.6–97.8)
MCV RBC AUTO: 104.5 FL (ref 79.6–97.8)
MCV RBC AUTO: 107.1 FL (ref 79.6–97.8)
MCV RBC AUTO: 107.1 FL (ref 79.6–97.8)
MCV RBC AUTO: 107.5 FL (ref 79.6–97.8)
MCV RBC AUTO: 107.7 FL (ref 79.6–97.8)
MCV RBC AUTO: 109.1 FL (ref 79.6–97.8)
MCV RBC AUTO: 109.3 FL (ref 79.6–97.8)
MCV RBC AUTO: 110.9 FL (ref 79.6–97.8)
MONOCYTES # BLD: 0.1 K/UL (ref 0.1–1.3)
MONOCYTES # BLD: 0.2 K/UL (ref 0.1–1.3)
MONOCYTES # BLD: 0.2 K/UL (ref 0.1–1.3)
MONOCYTES # BLD: 0.4 K/UL (ref 0.1–1.3)
MONOCYTES # BLD: 0.5 K/UL (ref 0.1–1.3)
MONOCYTES # BLD: 0.5 K/UL (ref 0.1–1.3)
MONOCYTES # BLD: 1.1 K/UL (ref 0.1–1.3)
MONOCYTES # BLD: 1.6 K/UL (ref 0.1–1.3)
MONOCYTES # BLD: 1.6 K/UL (ref 0.1–1.3)
MONOCYTES NFR BLD: 10 % (ref 4–12)
MONOCYTES NFR BLD: 14 % (ref 4–12)
MONOCYTES NFR BLD: 15 % (ref 4–12)
MONOCYTES NFR BLD: 2 % (ref 4–12)
MONOCYTES NFR BLD: 3 % (ref 4–12)
MONOCYTES NFR BLD: 4 % (ref 4–12)
MONOCYTES NFR BLD: 4 % (ref 4–12)
MONOCYTES NFR BLD: 5 % (ref 4–12)
MONOCYTES NFR BLD: 6 % (ref 4–12)
MONOCYTES NFR BLD: 8 % (ref 4–12)
MONOCYTES NFR BLD: 8 % (ref 4–12)
NEUTS SEG # BLD: 0.4 K/UL (ref 1.7–8.2)
NEUTS SEG # BLD: 0.8 K/UL (ref 1.7–8.2)
NEUTS SEG # BLD: 1 K/UL (ref 1.7–8.2)
NEUTS SEG # BLD: 1.7 K/UL (ref 1.7–8.2)
NEUTS SEG # BLD: 10.2 K/UL (ref 1.7–8.2)
NEUTS SEG # BLD: 10.4 K/UL (ref 1.7–8.2)
NEUTS SEG # BLD: 4.9 K/UL (ref 1.7–8.2)
NEUTS SEG # BLD: 5.1 K/UL (ref 1.7–8.2)
NEUTS SEG # BLD: 6.7 K/UL (ref 1.7–8.2)
NEUTS SEG # BLD: 8.8 K/UL (ref 1.7–8.2)
NEUTS SEG # BLD: 9 K/UL (ref 1.7–8.2)
NEUTS SEG NFR BLD: 41 % (ref 43–78)
NEUTS SEG NFR BLD: 43 % (ref 43–78)
NEUTS SEG NFR BLD: 44 % (ref 43–78)
NEUTS SEG NFR BLD: 57 % (ref 43–78)
NEUTS SEG NFR BLD: 60 % (ref 43–78)
NEUTS SEG NFR BLD: 67 % (ref 43–78)
NEUTS SEG NFR BLD: 75 % (ref 43–78)
NEUTS SEG NFR BLD: 77 % (ref 43–78)
NEUTS SEG NFR BLD: 82 % (ref 43–78)
NEUTS SEG NFR BLD: 85 % (ref 43–78)
NEUTS SEG NFR BLD: 87 % (ref 43–78)
NRBC # BLD: 0 K/UL (ref 0–0.2)
NRBC # BLD: 0.03 K/UL (ref 0–0.2)
NRBC # BLD: 0.03 K/UL (ref 0–0.2)
NRBC # BLD: 0.05 K/UL (ref 0–0.2)
NRBC # BLD: 0.05 K/UL (ref 0–0.2)
NRBC # BLD: 0.15 K/UL (ref 0–0.2)
O2/TOTAL GAS SETTING VFR VENT: 100 %
O2/TOTAL GAS SETTING VFR VENT: 100 %
O2/TOTAL GAS SETTING VFR VENT: 50 %
O2/TOTAL GAS SETTING VFR VENT: 70 %
O2/TOTAL GAS SETTING VFR VENT: 95 %
P-R INTERVAL, ECG05: 174 MS
P-R INTERVAL, ECG05: 194 MS
P-R INTERVAL, ECG05: 200 MS
PCO2 BLD: 34.6 MMHG (ref 35–45)
PCO2 BLD: 34.8 MMHG (ref 35–45)
PCO2 BLD: 38.8 MMHG (ref 35–45)
PCO2 BLD: 39.3 MMHG (ref 35–45)
PCO2 BLD: 44.4 MMHG (ref 35–45)
PEEP RESPIRATORY: 10 CMH2O
PEEP RESPIRATORY: 10 CMH2O
PH BLD: 7.19 [PH] (ref 7.35–7.45)
PH BLD: 7.3 [PH] (ref 7.35–7.45)
PH BLD: 7.32 [PH] (ref 7.35–7.45)
PH BLD: 7.34 [PH] (ref 7.35–7.45)
PH BLD: 7.43 [PH] (ref 7.35–7.45)
PIP ISTAT,IPIP: 23
PLATELET # BLD AUTO: 145 K/UL (ref 150–450)
PLATELET # BLD AUTO: 151 K/UL (ref 150–450)
PLATELET # BLD AUTO: 21 K/UL (ref 150–450)
PLATELET # BLD AUTO: 234 K/UL (ref 150–450)
PLATELET # BLD AUTO: 236 K/UL (ref 150–450)
PLATELET # BLD AUTO: 261 K/UL (ref 150–450)
PLATELET # BLD AUTO: 266 K/UL (ref 150–450)
PLATELET # BLD AUTO: 283 K/UL (ref 150–450)
PLATELET # BLD AUTO: 30 K/UL (ref 150–450)
PLATELET # BLD AUTO: 342 K/UL (ref 150–450)
PLATELET # BLD AUTO: 72 K/UL (ref 150–450)
PLATELET # BLD AUTO: 95 K/UL (ref 150–450)
PLATELET COMMENTS,PCOM: ABNORMAL
PLATELET COMMENTS,PCOM: ADEQUATE
PLATELET COMMENTS,PCOM: SLIGHT
PMV BLD AUTO: 10 FL (ref 9.4–12.3)
PMV BLD AUTO: 10.2 FL (ref 9.4–12.3)
PMV BLD AUTO: 10.7 FL (ref 9.4–12.3)
PMV BLD AUTO: 12.2 FL (ref 9.4–12.3)
PMV BLD AUTO: 13.1 FL (ref 9.4–12.3)
PMV BLD AUTO: 8.6 FL (ref 9.4–12.3)
PMV BLD AUTO: 8.6 FL (ref 9.4–12.3)
PMV BLD AUTO: 8.7 FL (ref 9.4–12.3)
PMV BLD AUTO: 8.7 FL (ref 9.4–12.3)
PMV BLD AUTO: 8.9 FL (ref 9.4–12.3)
PMV BLD AUTO: 8.9 FL (ref 9.4–12.3)
PMV BLD AUTO: 9.1 FL (ref 9.4–12.3)
PO2 BLD: 117 MMHG (ref 75–100)
PO2 BLD: 140 MMHG (ref 75–100)
PO2 BLD: 56 MMHG (ref 75–100)
PO2 BLD: 88 MMHG (ref 75–100)
PO2 BLD: 92 MMHG (ref 75–100)
POTASSIUM SERPL-SCNC: 3.6 MMOL/L (ref 3.5–5.1)
POTASSIUM SERPL-SCNC: 3.7 MMOL/L (ref 3.5–5.1)
POTASSIUM SERPL-SCNC: 4 MMOL/L (ref 3.5–5.1)
POTASSIUM SERPL-SCNC: 4.1 MMOL/L (ref 3.5–5.1)
POTASSIUM SERPL-SCNC: 4.3 MMOL/L (ref 3.5–5.1)
POTASSIUM SERPL-SCNC: 4.3 MMOL/L (ref 3.5–5.1)
POTASSIUM SERPL-SCNC: 4.4 MMOL/L (ref 3.5–5.1)
POTASSIUM SERPL-SCNC: 4.7 MMOL/L (ref 3.5–5.1)
POTASSIUM SERPL-SCNC: 5.1 MMOL/L (ref 3.5–5.1)
POTASSIUM SERPL-SCNC: 7 MMOL/L (ref 3.5–5.1)
PRESSURE SUPPORT SETTING VENT: 20 CMH2O
PRESSURE SUPPORT SETTING VENT: 6 CMH2O
PROCALCITONIN SERPL-MCNC: 0.8 NG/ML
PROT SERPL-MCNC: 6.9 G/DL (ref 6.3–8.2)
PROT SERPL-MCNC: 7 G/DL (ref 6.3–8.2)
PROT SERPL-MCNC: 7.4 G/DL (ref 6.3–8.2)
PROT SERPL-MCNC: 7.5 G/DL (ref 6.3–8.2)
PROT SERPL-MCNC: 7.6 G/DL (ref 6.3–8.2)
PROT SERPL-MCNC: 7.9 G/DL (ref 6.3–8.2)
PROT SERPL-MCNC: 8 G/DL (ref 6.3–8.2)
PROT SERPL-MCNC: 8 G/DL (ref 6.3–8.2)
PROT SERPL-MCNC: 8.4 G/DL (ref 6.3–8.2)
Q-T INTERVAL, ECG07: 328 MS
Q-T INTERVAL, ECG07: 376 MS
Q-T INTERVAL, ECG07: 380 MS
QRS DURATION, ECG06: 86 MS
QRS DURATION, ECG06: 86 MS
QRS DURATION, ECG06: 92 MS
QTC CALCULATION (BEZET), ECG08: 397 MS
QTC CALCULATION (BEZET), ECG08: 424 MS
QTC CALCULATION (BEZET), ECG08: 437 MS
RBC # BLD AUTO: 2.16 M/UL (ref 4.23–5.6)
RBC # BLD AUTO: 2.28 M/UL (ref 4.23–5.6)
RBC # BLD AUTO: 2.33 M/UL (ref 4.23–5.6)
RBC # BLD AUTO: 2.39 M/UL (ref 4.23–5.6)
RBC # BLD AUTO: 2.63 M/UL (ref 4.23–5.67)
RBC # BLD AUTO: 2.64 M/UL (ref 4.23–5.6)
RBC # BLD AUTO: 2.66 M/UL (ref 4.23–5.67)
RBC # BLD AUTO: 2.67 M/UL (ref 4.23–5.6)
RBC # BLD AUTO: 2.67 M/UL (ref 4.23–5.6)
RBC # BLD AUTO: 2.68 M/UL (ref 4.23–5.6)
RBC # BLD AUTO: 2.87 M/UL (ref 4.23–5.67)
RBC # BLD AUTO: 3.06 M/UL (ref 4.23–5.67)
RBC MORPH BLD: ABNORMAL
SAO2 % BLD: 86.6 % (ref 95–98)
SAO2 % BLD: 96.2 % (ref 95–98)
SAO2 % BLD: 97.1 % (ref 95–98)
SAO2 % BLD: 98.4 % (ref 95–98)
SAO2 % BLD: 98.4 % (ref 95–98)
SARS COV-2, XPGCVT: NEGATIVE
SARS-COV-2, COV2: NORMAL
SARS-COV-2, COV2: NORMAL
SARS-COV-2, COV2: NOT DETECTED
SERVICE CMNT-IMP: ABNORMAL
SERVICE CMNT-IMP: NORMAL
SODIUM SERPL-SCNC: 129 MMOL/L (ref 136–145)
SODIUM SERPL-SCNC: 131 MMOL/L (ref 136–145)
SODIUM SERPL-SCNC: 131 MMOL/L (ref 136–145)
SODIUM SERPL-SCNC: 132 MMOL/L (ref 136–145)
SODIUM SERPL-SCNC: 133 MMOL/L (ref 136–145)
SODIUM SERPL-SCNC: 134 MMOL/L (ref 136–145)
SODIUM SERPL-SCNC: 134 MMOL/L (ref 138–145)
SODIUM SERPL-SCNC: 135 MMOL/L (ref 136–145)
SODIUM SERPL-SCNC: 135 MMOL/L (ref 136–145)
SODIUM SERPL-SCNC: 138 MMOL/L (ref 136–145)
SOURCE, COVRS: NORMAL
SOURCE, COVRS: NORMAL
SPECIMEN SOURCE, FCOV2M: NORMAL
SPECIMEN TYPE: ABNORMAL
T3 SERPL-MCNC: 1.19 NG/ML (ref 0.6–1.81)
T4 FREE SERPL-MCNC: 1 NG/DL (ref 0.78–1.4)
T4 FREE SERPL-MCNC: 1 NG/DL (ref 0.78–1.4)
TOTAL RESP. RATE, ITRR: 35
TROPONIN-HIGH SENSITIVITY: 19.1 PG/ML (ref 0–14)
TROPONIN-HIGH SENSITIVITY: 37.3 PG/ML (ref 0–14)
TSH SERPL DL<=0.005 MIU/L-ACNC: 0.47 UIU/ML (ref 0.36–3)
TSH SERPL DL<=0.005 MIU/L-ACNC: 0.47 UIU/ML (ref 0.36–3.74)
TSH SERPL DL<=0.005 MIU/L-ACNC: 0.6 UIU/ML (ref 0.36–3)
VANCOMYCIN SERPL-MCNC: 19.7 UG/ML
VENTILATION MODE VENT: ABNORMAL
VENTRICULAR RATE, ECG03: 107 BPM
VENTRICULAR RATE, ECG03: 67 BPM
VENTRICULAR RATE, ECG03: 75 BPM
WBC # BLD AUTO: 1 K/UL (ref 4.3–11.1)
WBC # BLD AUTO: 1 K/UL (ref 4.3–11.1)
WBC # BLD AUTO: 1.4 K/UL (ref 4.3–11.1)
WBC # BLD AUTO: 10.3 K/UL (ref 4.3–11.1)
WBC # BLD AUTO: 10.8 K/UL (ref 4.3–11.1)
WBC # BLD AUTO: 10.9 K/UL (ref 4.3–11.1)
WBC # BLD AUTO: 11.7 K/UL (ref 4.3–11.1)
WBC # BLD AUTO: 13.4 K/UL (ref 4.3–11.1)
WBC # BLD AUTO: 13.5 K/UL (ref 4.3–11.1)
WBC # BLD AUTO: 2 K/UL (ref 4.3–11.1)
WBC # BLD AUTO: 2.9 K/UL (ref 4.3–11.1)
WBC # BLD AUTO: 6 K/UL (ref 4.3–11.1)
WBC MORPH BLD: ABNORMAL

## 2021-01-01 PROCEDURE — 93005 ELECTROCARDIOGRAM TRACING: CPT | Performed by: EMERGENCY MEDICINE

## 2021-01-01 PROCEDURE — 83605 ASSAY OF LACTIC ACID: CPT

## 2021-01-01 PROCEDURE — 99283 EMERGENCY DEPT VISIT LOW MDM: CPT

## 2021-01-01 PROCEDURE — 96411 CHEMO IV PUSH ADDL DRUG: CPT

## 2021-01-01 PROCEDURE — 74011000250 HC RX REV CODE- 250: Performed by: INTERNAL MEDICINE

## 2021-01-01 PROCEDURE — 85025 COMPLETE CBC W/AUTO DIFF WBC: CPT

## 2021-01-01 PROCEDURE — 77030005402 HC CATH RAD ART LN KT TELE -B

## 2021-01-01 PROCEDURE — 74011000258 HC RX REV CODE- 258: Performed by: FAMILY MEDICINE

## 2021-01-01 PROCEDURE — 80048 BASIC METABOLIC PNL TOTAL CA: CPT

## 2021-01-01 PROCEDURE — 99223 1ST HOSP IP/OBS HIGH 75: CPT | Performed by: NURSE PRACTITIONER

## 2021-01-01 PROCEDURE — 80053 COMPREHEN METABOLIC PANEL: CPT

## 2021-01-01 PROCEDURE — 84439 ASSAY OF FREE THYROXINE: CPT

## 2021-01-01 PROCEDURE — 84480 ASSAY TRIIODOTHYRONINE (T3): CPT

## 2021-01-01 PROCEDURE — 93005 ELECTROCARDIOGRAM TRACING: CPT

## 2021-01-01 PROCEDURE — 74011250636 HC RX REV CODE- 250/636: Performed by: NURSE PRACTITIONER

## 2021-01-01 PROCEDURE — 82962 GLUCOSE BLOOD TEST: CPT

## 2021-01-01 PROCEDURE — 74011250636 HC RX REV CODE- 250/636: Performed by: INTERNAL MEDICINE

## 2021-01-01 PROCEDURE — 74011250636 HC RX REV CODE- 250/636: Performed by: FAMILY MEDICINE

## 2021-01-01 PROCEDURE — 74011000250 HC RX REV CODE- 250: Performed by: FAMILY MEDICINE

## 2021-01-01 PROCEDURE — 82803 BLOOD GASES ANY COMBINATION: CPT

## 2021-01-01 PROCEDURE — 77030034850

## 2021-01-01 PROCEDURE — 87040 BLOOD CULTURE FOR BACTERIA: CPT

## 2021-01-01 PROCEDURE — 2709999900 HC NON-CHARGEABLE SUPPLY

## 2021-01-01 PROCEDURE — 99239 HOSP IP/OBS DSCHRG MGMT >30: CPT | Performed by: INTERNAL MEDICINE

## 2021-01-01 PROCEDURE — 96375 TX/PRO/DX INJ NEW DRUG ADDON: CPT

## 2021-01-01 PROCEDURE — 36415 COLL VENOUS BLD VENIPUNCTURE: CPT

## 2021-01-01 PROCEDURE — 94640 AIRWAY INHALATION TREATMENT: CPT

## 2021-01-01 PROCEDURE — 5A09357 ASSISTANCE WITH RESPIRATORY VENTILATION, LESS THAN 24 CONSECUTIVE HOURS, CONTINUOUS POSITIVE AIRWAY PRESSURE: ICD-10-PCS | Performed by: FAMILY MEDICINE

## 2021-01-01 PROCEDURE — 96372 THER/PROPH/DIAG INJ SC/IM: CPT

## 2021-01-01 PROCEDURE — 76450000000

## 2021-01-01 PROCEDURE — 84145 PROCALCITONIN (PCT): CPT

## 2021-01-01 PROCEDURE — 94660 CPAP INITIATION&MGMT: CPT

## 2021-01-01 PROCEDURE — 96409 CHEMO IV PUSH SNGL DRUG: CPT

## 2021-01-01 PROCEDURE — U0003 INFECTIOUS AGENT DETECTION BY NUCLEIC ACID (DNA OR RNA); SEVERE ACUTE RESPIRATORY SYNDROME CORONAVIRUS 2 (SARS-COV-2) (CORONAVIRUS DISEASE [COVID-19]), AMPLIFIED PROBE TECHNIQUE, MAKING USE OF HIGH THROUGHPUT TECHNOLOGIES AS DESCRIBED BY CMS-2020-01-R: HCPCS

## 2021-01-01 PROCEDURE — 74011000258 HC RX REV CODE- 258: Performed by: EMERGENCY MEDICINE

## 2021-01-01 PROCEDURE — 36600 WITHDRAWAL OF ARTERIAL BLOOD: CPT

## 2021-01-01 PROCEDURE — 80202 ASSAY OF VANCOMYCIN: CPT

## 2021-01-01 PROCEDURE — 74011250636 HC RX REV CODE- 250/636

## 2021-01-01 PROCEDURE — 74011000250 HC RX REV CODE- 250: Performed by: EMERGENCY MEDICINE

## 2021-01-01 PROCEDURE — 84443 ASSAY THYROID STIM HORMONE: CPT

## 2021-01-01 PROCEDURE — 83880 ASSAY OF NATRIURETIC PEPTIDE: CPT

## 2021-01-01 PROCEDURE — 83735 ASSAY OF MAGNESIUM: CPT

## 2021-01-01 PROCEDURE — 74011250637 HC RX REV CODE- 250/637: Performed by: INTERNAL MEDICINE

## 2021-01-01 PROCEDURE — 71046 X-RAY EXAM CHEST 2 VIEWS: CPT

## 2021-01-01 PROCEDURE — 74011250636 HC RX REV CODE- 250/636: Performed by: EMERGENCY MEDICINE

## 2021-01-01 PROCEDURE — 77030013140 HC MSK NEB VYRM -A

## 2021-01-01 PROCEDURE — 96361 HYDRATE IV INFUSION ADD-ON: CPT

## 2021-01-01 PROCEDURE — 87635 SARS-COV-2 COVID-19 AMP PRB: CPT

## 2021-01-01 PROCEDURE — 94664 DEMO&/EVAL PT USE INHALER: CPT

## 2021-01-01 PROCEDURE — 77030040393 HC DRSG OPTIFOAM GENT MDII -B

## 2021-01-01 PROCEDURE — 96365 THER/PROPH/DIAG IV INF INIT: CPT

## 2021-01-01 PROCEDURE — 71045 X-RAY EXAM CHEST 1 VIEW: CPT

## 2021-01-01 PROCEDURE — 77030018798 HC PMP KT ENTRL FED COVD -A

## 2021-01-01 PROCEDURE — 99284 EMERGENCY DEPT VISIT MOD MDM: CPT

## 2021-01-01 PROCEDURE — 74011000258 HC RX REV CODE- 258: Performed by: NURSE PRACTITIONER

## 2021-01-01 PROCEDURE — 74011000258 HC RX REV CODE- 258: Performed by: INTERNAL MEDICINE

## 2021-01-01 PROCEDURE — 77030013794 HC KT TRNSDUC BLD EDWD -B

## 2021-01-01 PROCEDURE — 74011000636 HC RX REV CODE- 636

## 2021-01-01 PROCEDURE — 74011000258 HC RX REV CODE- 258

## 2021-01-01 PROCEDURE — 96413 CHEMO IV INFUSION 1 HR: CPT

## 2021-01-01 PROCEDURE — 84484 ASSAY OF TROPONIN QUANT: CPT

## 2021-01-01 PROCEDURE — 3E033XZ INTRODUCTION OF VASOPRESSOR INTO PERIPHERAL VEIN, PERCUTANEOUS APPROACH: ICD-10-PCS | Performed by: INTERNAL MEDICINE

## 2021-01-01 PROCEDURE — 99291 CRITICAL CARE FIRST HOUR: CPT | Performed by: INTERNAL MEDICINE

## 2021-01-01 PROCEDURE — 74177 CT ABD & PELVIS W/CONTRAST: CPT

## 2021-01-01 PROCEDURE — 65620000000 HC RM CCU GENERAL

## 2021-01-01 PROCEDURE — 74011250637 HC RX REV CODE- 250/637: Performed by: FAMILY MEDICINE

## 2021-01-01 PROCEDURE — 74011636637 HC RX REV CODE- 636/637: Performed by: INTERNAL MEDICINE

## 2021-01-01 PROCEDURE — 99292 CRITICAL CARE ADDL 30 MIN: CPT | Performed by: INTERNAL MEDICINE

## 2021-01-01 PROCEDURE — 99285 EMERGENCY DEPT VISIT HI MDM: CPT

## 2021-01-01 PROCEDURE — 85027 COMPLETE CBC AUTOMATED: CPT

## 2021-01-01 PROCEDURE — 4A133J1 MONITORING OF ARTERIAL PULSE, PERIPHERAL, PERCUTANEOUS APPROACH: ICD-10-PCS | Performed by: EMERGENCY MEDICINE

## 2021-01-01 PROCEDURE — 74011250637 HC RX REV CODE- 250/637: Performed by: EMERGENCY MEDICINE

## 2021-01-01 PROCEDURE — 96360 HYDRATION IV INFUSION INIT: CPT

## 2021-01-01 PROCEDURE — 4A133B1 MONITORING OF ARTERIAL PRESSURE, PERIPHERAL, PERCUTANEOUS APPROACH: ICD-10-PCS | Performed by: EMERGENCY MEDICINE

## 2021-01-01 PROCEDURE — 96374 THER/PROPH/DIAG INJ IV PUSH: CPT

## 2021-01-01 PROCEDURE — 74011250636 HC RX REV CODE- 250/636: Performed by: PHYSICIAN ASSISTANT

## 2021-01-01 RX ORDER — DEXAMETHASONE SODIUM PHOSPHATE 4 MG/ML
8 INJECTION, SOLUTION INTRA-ARTICULAR; INTRALESIONAL; INTRAMUSCULAR; INTRAVENOUS; SOFT TISSUE ONCE
Status: COMPLETED | OUTPATIENT
Start: 2021-01-01 | End: 2021-01-01

## 2021-01-01 RX ORDER — SODIUM CHLORIDE 0.9 % (FLUSH) 0.9 %
10 SYRINGE (ML) INJECTION AS NEEDED
Status: ACTIVE | OUTPATIENT
Start: 2021-01-01 | End: 2021-01-01

## 2021-01-01 RX ORDER — CYANOCOBALAMIN 1000 UG/ML
1000 INJECTION, SOLUTION INTRAMUSCULAR; SUBCUTANEOUS ONCE
Status: CANCELLED | OUTPATIENT
Start: 2021-01-01 | End: 2021-01-01

## 2021-01-01 RX ORDER — DOXYCYCLINE 100 MG/1
100 CAPSULE ORAL 2 TIMES DAILY
Qty: 20 CAP | Refills: 0 | Status: SHIPPED | OUTPATIENT
Start: 2021-01-01 | End: 2021-01-01

## 2021-01-01 RX ORDER — SODIUM CHLORIDE 0.9 % (FLUSH) 0.9 %
5-40 SYRINGE (ML) INJECTION AS NEEDED
Status: DISCONTINUED | OUTPATIENT
Start: 2021-01-01 | End: 2021-01-01 | Stop reason: HOSPADM

## 2021-01-01 RX ORDER — PANTOPRAZOLE SODIUM 40 MG/1
40 TABLET, DELAYED RELEASE ORAL DAILY
Status: DISCONTINUED | OUTPATIENT
Start: 2021-01-01 | End: 2021-01-01

## 2021-01-01 RX ORDER — SODIUM BICARBONATE 84 MG/ML
100 INJECTION, SOLUTION INTRAVENOUS ONCE
Status: COMPLETED | OUTPATIENT
Start: 2021-01-01 | End: 2021-01-01

## 2021-01-01 RX ORDER — DEXTROSE 50 % IN WATER (D50W) INTRAVENOUS SYRINGE
25
Status: COMPLETED | OUTPATIENT
Start: 2021-01-01 | End: 2021-01-01

## 2021-01-01 RX ORDER — CYANOCOBALAMIN 1000 UG/ML
1000 INJECTION, SOLUTION INTRAMUSCULAR; SUBCUTANEOUS ONCE
Status: CANCELLED
Start: 2021-01-01 | End: 2021-01-01

## 2021-01-01 RX ORDER — PREDNISONE 5 MG/1
TABLET ORAL
Qty: 21 TAB | Refills: 0 | Status: SHIPPED | OUTPATIENT
Start: 2021-01-01 | End: 2021-01-01 | Stop reason: SDUPTHER

## 2021-01-01 RX ORDER — SODIUM CHLORIDE 9 MG/ML
25 INJECTION, SOLUTION INTRAVENOUS CONTINUOUS
Status: ACTIVE | OUTPATIENT
Start: 2021-01-01 | End: 2021-01-01

## 2021-01-01 RX ORDER — ALBUTEROL SULFATE 90 UG/1
2 AEROSOL, METERED RESPIRATORY (INHALATION)
Qty: 1 INHALER | Refills: 0 | Status: SHIPPED | OUTPATIENT
Start: 2021-01-01 | End: 2021-01-01

## 2021-01-01 RX ORDER — SODIUM BICARBONATE 84 MG/ML
50 INJECTION, SOLUTION INTRAVENOUS ONCE
Status: COMPLETED | OUTPATIENT
Start: 2021-01-01 | End: 2021-01-01

## 2021-01-01 RX ORDER — SODIUM CHLORIDE 0.9 % (FLUSH) 0.9 %
5-40 SYRINGE (ML) INJECTION EVERY 8 HOURS
Status: DISCONTINUED | OUTPATIENT
Start: 2021-01-01 | End: 2021-01-01

## 2021-01-01 RX ORDER — IPRATROPIUM BROMIDE AND ALBUTEROL SULFATE 2.5; .5 MG/3ML; MG/3ML
3 SOLUTION RESPIRATORY (INHALATION)
Status: DISCONTINUED | OUTPATIENT
Start: 2021-01-01 | End: 2021-01-01 | Stop reason: SDUPTHER

## 2021-01-01 RX ORDER — ONDANSETRON 2 MG/ML
8 INJECTION INTRAMUSCULAR; INTRAVENOUS AS NEEDED
Status: DISPENSED | OUTPATIENT
Start: 2021-01-01 | End: 2021-01-01

## 2021-01-01 RX ORDER — HYDROCORTISONE SODIUM SUCCINATE 100 MG/2ML
100 INJECTION, POWDER, FOR SOLUTION INTRAMUSCULAR; INTRAVENOUS AS NEEDED
Status: CANCELLED | OUTPATIENT
Start: 2021-01-01

## 2021-01-01 RX ORDER — HYDROCORTISONE SODIUM SUCCINATE 100 MG/2ML
50 INJECTION, POWDER, FOR SOLUTION INTRAMUSCULAR; INTRAVENOUS EVERY 6 HOURS
Status: DISCONTINUED | OUTPATIENT
Start: 2021-01-01 | End: 2021-01-01

## 2021-01-01 RX ORDER — VANCOMYCIN/0.9 % SOD CHLORIDE 1.5G/250ML
1500 PLASTIC BAG, INJECTION (ML) INTRAVENOUS
Status: DISCONTINUED | OUTPATIENT
Start: 2021-01-01 | End: 2021-01-01

## 2021-01-01 RX ORDER — VANCOMYCIN/0.9 % SOD CHLORIDE 1.5G/250ML
1500 PLASTIC BAG, INJECTION (ML) INTRAVENOUS SEE ADMIN INSTRUCTIONS
Status: DISCONTINUED | OUTPATIENT
Start: 2021-01-01 | End: 2021-01-01

## 2021-01-01 RX ORDER — ONDANSETRON 2 MG/ML
8 INJECTION INTRAMUSCULAR; INTRAVENOUS AS NEEDED
Status: CANCELLED | OUTPATIENT
Start: 2021-01-01

## 2021-01-01 RX ORDER — ONDANSETRON 2 MG/ML
4 INJECTION INTRAMUSCULAR; INTRAVENOUS ONCE
Status: COMPLETED | OUTPATIENT
Start: 2021-01-01 | End: 2021-01-01

## 2021-01-01 RX ORDER — CYANOCOBALAMIN 1000 UG/ML
1000 INJECTION, SOLUTION INTRAMUSCULAR; SUBCUTANEOUS ONCE
Status: COMPLETED | OUTPATIENT
Start: 2021-01-01 | End: 2021-01-01

## 2021-01-01 RX ORDER — ACETAMINOPHEN 325 MG/1
650 TABLET ORAL
Status: DISCONTINUED | OUTPATIENT
Start: 2021-01-01 | End: 2021-01-01 | Stop reason: HOSPADM

## 2021-01-01 RX ORDER — PREDNISONE 5 MG/1
TABLET ORAL
Qty: 21 TAB | Refills: 0 | Status: SHIPPED | OUTPATIENT
Start: 2021-01-01 | End: 2021-01-01 | Stop reason: ALTCHOICE

## 2021-01-01 RX ORDER — MORPHINE SULFATE 4 MG/ML
4 INJECTION INTRAVENOUS
Status: DISCONTINUED | OUTPATIENT
Start: 2021-01-01 | End: 2021-01-01 | Stop reason: HOSPADM

## 2021-01-01 RX ORDER — SODIUM CHLORIDE 9 MG/ML
10 INJECTION INTRAMUSCULAR; INTRAVENOUS; SUBCUTANEOUS AS NEEDED
Status: CANCELLED | OUTPATIENT
Start: 2021-01-01

## 2021-01-01 RX ORDER — DIPHENHYDRAMINE HCL 25 MG
25 CAPSULE ORAL ONCE
Status: COMPLETED | OUTPATIENT
Start: 2021-01-01 | End: 2021-01-01

## 2021-01-01 RX ORDER — ONDANSETRON 2 MG/ML
INJECTION INTRAMUSCULAR; INTRAVENOUS
Status: COMPLETED
Start: 2021-01-01 | End: 2021-01-01

## 2021-01-01 RX ORDER — ADHESIVE BANDAGE
30 BANDAGE TOPICAL DAILY PRN
Status: DISCONTINUED | OUTPATIENT
Start: 2021-01-01 | End: 2021-01-01 | Stop reason: HOSPADM

## 2021-01-01 RX ORDER — IPRATROPIUM BROMIDE AND ALBUTEROL SULFATE 2.5; .5 MG/3ML; MG/3ML
3 SOLUTION RESPIRATORY (INHALATION)
Status: DISCONTINUED | OUTPATIENT
Start: 2021-01-01 | End: 2021-01-01 | Stop reason: HOSPADM

## 2021-01-01 RX ORDER — NOREPINEPHRINE BITARTRATE/D5W 4MG/250ML
.5-3 PLASTIC BAG, INJECTION (ML) INTRAVENOUS
Status: DISCONTINUED | OUTPATIENT
Start: 2021-01-01 | End: 2021-01-01

## 2021-01-01 RX ORDER — EPINEPHRINE 1 MG/ML
0.3 INJECTION, SOLUTION, CONCENTRATE INTRAVENOUS AS NEEDED
Status: CANCELLED | OUTPATIENT
Start: 2021-01-01

## 2021-01-01 RX ORDER — IPRATROPIUM BROMIDE AND ALBUTEROL SULFATE 2.5; .5 MG/3ML; MG/3ML
3 SOLUTION RESPIRATORY (INHALATION)
Status: COMPLETED | OUTPATIENT
Start: 2021-01-01 | End: 2021-01-01

## 2021-01-01 RX ORDER — DIPHENHYDRAMINE HYDROCHLORIDE 50 MG/ML
25 INJECTION, SOLUTION INTRAMUSCULAR; INTRAVENOUS AS NEEDED
Status: CANCELLED
Start: 2021-01-01

## 2021-01-01 RX ORDER — ONDANSETRON 2 MG/ML
4 INJECTION INTRAMUSCULAR; INTRAVENOUS
Status: DISCONTINUED | OUTPATIENT
Start: 2021-01-01 | End: 2021-01-01 | Stop reason: HOSPADM

## 2021-01-01 RX ORDER — SODIUM CHLORIDE, SODIUM LACTATE, POTASSIUM CHLORIDE, CALCIUM CHLORIDE 600; 310; 30; 20 MG/100ML; MG/100ML; MG/100ML; MG/100ML
250 INJECTION, SOLUTION INTRAVENOUS CONTINUOUS
Status: DISCONTINUED | OUTPATIENT
Start: 2021-01-01 | End: 2021-01-01

## 2021-01-01 RX ORDER — SODIUM CHLORIDE 0.9 % (FLUSH) 0.9 %
10 SYRINGE (ML) INJECTION
Status: COMPLETED | OUTPATIENT
Start: 2021-01-01 | End: 2021-01-01

## 2021-01-01 RX ORDER — SODIUM CHLORIDE 9 MG/ML
1000 INJECTION, SOLUTION INTRAVENOUS ONCE
Status: COMPLETED | OUTPATIENT
Start: 2021-01-01 | End: 2021-01-01

## 2021-01-01 RX ORDER — FOLIC ACID 1 MG/1
1 TABLET ORAL DAILY
Status: DISCONTINUED | OUTPATIENT
Start: 2021-01-01 | End: 2021-01-01

## 2021-01-01 RX ORDER — ENOXAPARIN SODIUM 100 MG/ML
40 INJECTION SUBCUTANEOUS DAILY
Status: DISCONTINUED | OUTPATIENT
Start: 2021-01-01 | End: 2021-01-01 | Stop reason: SDUPTHER

## 2021-01-01 RX ORDER — ACETAMINOPHEN 500 MG
1000 TABLET ORAL
Status: COMPLETED | OUTPATIENT
Start: 2021-01-01 | End: 2021-01-01

## 2021-01-01 RX ORDER — ENOXAPARIN SODIUM 100 MG/ML
40 INJECTION SUBCUTANEOUS EVERY 24 HOURS
Status: DISCONTINUED | OUTPATIENT
Start: 2021-01-01 | End: 2021-01-01

## 2021-01-01 RX ORDER — ONDANSETRON 2 MG/ML
INJECTION INTRAMUSCULAR; INTRAVENOUS
Status: DISCONTINUED
Start: 2021-01-01 | End: 2021-01-01 | Stop reason: HOSPADM

## 2021-01-01 RX ORDER — ACETAMINOPHEN 325 MG/1
650 TABLET ORAL AS NEEDED
Status: CANCELLED
Start: 2021-01-01

## 2021-01-01 RX ORDER — MORPHINE SULFATE 2 MG/ML
1 INJECTION, SOLUTION INTRAMUSCULAR; INTRAVENOUS
Status: DISCONTINUED | OUTPATIENT
Start: 2021-01-01 | End: 2021-01-01 | Stop reason: HOSPADM

## 2021-01-01 RX ORDER — CALCIUM GLUCONATE 20 MG/ML
1 INJECTION, SOLUTION INTRAVENOUS ONCE
Status: COMPLETED | OUTPATIENT
Start: 2021-01-01 | End: 2021-01-01

## 2021-01-01 RX ORDER — ALBUTEROL SULFATE 0.83 MG/ML
2.5 SOLUTION RESPIRATORY (INHALATION) AS NEEDED
Status: CANCELLED
Start: 2021-01-01

## 2021-01-01 RX ORDER — HEPARIN 100 UNIT/ML
300-500 SYRINGE INTRAVENOUS AS NEEDED
Status: CANCELLED
Start: 2021-01-01

## 2021-01-01 RX ORDER — DEXTROSE 50 % IN WATER (D50W) INTRAVENOUS SYRINGE
25-50 AS NEEDED
Status: DISCONTINUED | OUTPATIENT
Start: 2021-01-01 | End: 2021-01-01 | Stop reason: HOSPADM

## 2021-01-01 RX ORDER — DIPHENHYDRAMINE HYDROCHLORIDE 50 MG/ML
50 INJECTION, SOLUTION INTRAMUSCULAR; INTRAVENOUS AS NEEDED
Status: CANCELLED
Start: 2021-01-01

## 2021-01-01 RX ADMIN — IPRATROPIUM BROMIDE AND ALBUTEROL SULFATE 3 ML: .5; 3 SOLUTION RESPIRATORY (INHALATION) at 08:57

## 2021-01-01 RX ADMIN — IPRATROPIUM BROMIDE AND ALBUTEROL SULFATE 3 ML: .5; 3 SOLUTION RESPIRATORY (INHALATION) at 08:15

## 2021-01-01 RX ADMIN — DEXTROSE MONOHYDRATE 16 MCG/MIN: 50 INJECTION, SOLUTION INTRAVENOUS at 23:59

## 2021-01-01 RX ADMIN — SODIUM BICARBONATE: 84 INJECTION, SOLUTION INTRAVENOUS at 10:48

## 2021-01-01 RX ADMIN — Medication 10 ML: at 21:30

## 2021-01-01 RX ADMIN — DIATRIZOATE MEGLUMINE AND DIATRIZOATE SODIUM 15 ML: 660; 100 LIQUID ORAL; RECTAL at 11:13

## 2021-01-01 RX ADMIN — CEFEPIME HYDROCHLORIDE 2 G: 2 INJECTION, POWDER, FOR SOLUTION INTRAVENOUS at 20:12

## 2021-01-01 RX ADMIN — HYDROCORTISONE SODIUM SUCCINATE 50 MG: 100 INJECTION, POWDER, FOR SOLUTION INTRAMUSCULAR; INTRAVENOUS at 00:19

## 2021-01-01 RX ADMIN — CEFEPIME HYDROCHLORIDE 2 G: 2 INJECTION, POWDER, FOR SOLUTION INTRAVENOUS at 08:51

## 2021-01-01 RX ADMIN — VASOPRESSIN 0.1 UNITS/MIN: 20 INJECTION INTRAVENOUS at 12:30

## 2021-01-01 RX ADMIN — Medication 10 ML: at 08:44

## 2021-01-01 RX ADMIN — PHENYLEPHRINE HYDROCHLORIDE 30 MCG/MIN: 10 INJECTION INTRAVENOUS at 08:57

## 2021-01-01 RX ADMIN — SODIUM CHLORIDE 1000 ML: 900 INJECTION, SOLUTION INTRAVENOUS at 23:42

## 2021-01-01 RX ADMIN — HYDROCORTISONE SODIUM SUCCINATE 50 MG: 100 INJECTION, POWDER, FOR SOLUTION INTRAMUSCULAR; INTRAVENOUS at 17:51

## 2021-01-01 RX ADMIN — IOPAMIDOL 100 ML: 755 INJECTION, SOLUTION INTRAVENOUS at 13:19

## 2021-01-01 RX ADMIN — SODIUM CHLORIDE 1000 ML: 900 INJECTION, SOLUTION INTRAVENOUS at 11:27

## 2021-01-01 RX ADMIN — SODIUM CHLORIDE 1100 MG: 9 INJECTION, SOLUTION INTRAVENOUS at 08:17

## 2021-01-01 RX ADMIN — VANCOMYCIN HYDROCHLORIDE 750 MG: 750 INJECTION, POWDER, LYOPHILIZED, FOR SOLUTION INTRAVENOUS at 08:36

## 2021-01-01 RX ADMIN — DEXTROSE MONOHYDRATE 4 MCG/MIN: 50 INJECTION, SOLUTION INTRAVENOUS at 02:42

## 2021-01-01 RX ADMIN — IPRATROPIUM BROMIDE AND ALBUTEROL SULFATE 3 ML: .5; 3 SOLUTION RESPIRATORY (INHALATION) at 04:10

## 2021-01-01 RX ADMIN — VASOPRESSIN 0.1 UNITS/MIN: 20 INJECTION INTRAVENOUS at 08:41

## 2021-01-01 RX ADMIN — Medication 10 ML: at 10:50

## 2021-01-01 RX ADMIN — HYDROCORTISONE SODIUM SUCCINATE 50 MG: 100 INJECTION, POWDER, FOR SOLUTION INTRAMUSCULAR; INTRAVENOUS at 05:55

## 2021-01-01 RX ADMIN — Medication 10 ML: at 07:25

## 2021-01-01 RX ADMIN — CEFEPIME HYDROCHLORIDE 2 G: 2 INJECTION, POWDER, FOR SOLUTION INTRAVENOUS at 08:57

## 2021-01-01 RX ADMIN — VASOPRESSIN 0.01 UNITS/MIN: 20 INJECTION INTRAVENOUS at 04:49

## 2021-01-01 RX ADMIN — Medication 10 ML: at 07:50

## 2021-01-01 RX ADMIN — Medication 10 ML: at 08:42

## 2021-01-01 RX ADMIN — ACETAMINOPHEN 1000 MG: 325 SUSPENSION ORAL at 02:28

## 2021-01-01 RX ADMIN — SODIUM CHLORIDE 200 MG: 9 INJECTION, SOLUTION INTRAVENOUS at 09:26

## 2021-01-01 RX ADMIN — Medication 10 ML: at 05:14

## 2021-01-01 RX ADMIN — MORPHINE SULFATE 4 MG: 4 INJECTION INTRAVENOUS at 13:40

## 2021-01-01 RX ADMIN — IPRATROPIUM BROMIDE AND ALBUTEROL SULFATE 3 ML: .5; 3 SOLUTION RESPIRATORY (INHALATION) at 15:40

## 2021-01-01 RX ADMIN — CEFEPIME HYDROCHLORIDE 2 G: 2 INJECTION, POWDER, FOR SOLUTION INTRAVENOUS at 00:42

## 2021-01-01 RX ADMIN — Medication 10 ML: at 13:43

## 2021-01-01 RX ADMIN — Medication 10 ML: at 05:56

## 2021-01-01 RX ADMIN — HYDROCORTISONE SODIUM SUCCINATE 50 MG: 100 INJECTION, POWDER, FOR SOLUTION INTRAMUSCULAR; INTRAVENOUS at 06:28

## 2021-01-01 RX ADMIN — VASOPRESSIN 0.06 UNITS/MIN: 20 INJECTION INTRAVENOUS at 05:55

## 2021-01-01 RX ADMIN — SODIUM CHLORIDE 1000 ML: 900 INJECTION, SOLUTION INTRAVENOUS at 11:13

## 2021-01-01 RX ADMIN — HYDROCORTISONE SODIUM SUCCINATE 50 MG: 100 INJECTION, POWDER, FOR SOLUTION INTRAMUSCULAR; INTRAVENOUS at 18:29

## 2021-01-01 RX ADMIN — HYDROCORTISONE SODIUM SUCCINATE 50 MG: 100 INJECTION, POWDER, FOR SOLUTION INTRAMUSCULAR; INTRAVENOUS at 05:13

## 2021-01-01 RX ADMIN — DEXTROSE MONOHYDRATE 30 MCG/MIN: 50 INJECTION, SOLUTION INTRAVENOUS at 08:28

## 2021-01-01 RX ADMIN — DEXTROSE MONOHYDRATE 25 G: 25 INJECTION, SOLUTION INTRAVENOUS at 08:13

## 2021-01-01 RX ADMIN — VASOPRESSIN 0.09 UNITS/MIN: 20 INJECTION INTRAVENOUS at 22:26

## 2021-01-01 RX ADMIN — DEXTROSE MONOHYDRATE 25 G: 25 INJECTION, SOLUTION INTRAVENOUS at 17:50

## 2021-01-01 RX ADMIN — MORPHINE SULFATE 4 MG: 4 INJECTION INTRAVENOUS at 00:10

## 2021-01-01 RX ADMIN — MORPHINE SULFATE 4 MG: 4 INJECTION INTRAVENOUS at 14:41

## 2021-01-01 RX ADMIN — MORPHINE SULFATE 4 MG: 4 INJECTION INTRAVENOUS at 16:39

## 2021-01-01 RX ADMIN — SODIUM CHLORIDE 1000 ML: 900 INJECTION, SOLUTION INTRAVENOUS at 20:59

## 2021-01-01 RX ADMIN — IPRATROPIUM BROMIDE AND ALBUTEROL SULFATE 3 ML: .5; 3 SOLUTION RESPIRATORY (INHALATION) at 11:46

## 2021-01-01 RX ADMIN — SODIUM CHLORIDE 25 ML/HR: 900 INJECTION, SOLUTION INTRAVENOUS at 09:17

## 2021-01-01 RX ADMIN — INSULIN HUMAN 6 UNITS: 100 INJECTION, SOLUTION PARENTERAL at 04:57

## 2021-01-01 RX ADMIN — SODIUM CHLORIDE 1000 ML: 900 INJECTION, SOLUTION INTRAVENOUS at 22:48

## 2021-01-01 RX ADMIN — IPRATROPIUM BROMIDE AND ALBUTEROL SULFATE 3 ML: .5; 3 SOLUTION RESPIRATORY (INHALATION) at 23:04

## 2021-01-01 RX ADMIN — IPRATROPIUM BROMIDE AND ALBUTEROL SULFATE 3 ML: .5; 3 SOLUTION RESPIRATORY (INHALATION) at 07:26

## 2021-01-01 RX ADMIN — IPRATROPIUM BROMIDE AND ALBUTEROL SULFATE 3 ML: .5; 3 SOLUTION RESPIRATORY (INHALATION) at 03:47

## 2021-01-01 RX ADMIN — CEFEPIME HYDROCHLORIDE 2 G: 2 INJECTION, POWDER, FOR SOLUTION INTRAVENOUS at 08:35

## 2021-01-01 RX ADMIN — SODIUM CHLORIDE, SODIUM LACTATE, POTASSIUM CHLORIDE, AND CALCIUM CHLORIDE 250 ML/HR: 600; 310; 30; 20 INJECTION, SOLUTION INTRAVENOUS at 04:50

## 2021-01-01 RX ADMIN — IPRATROPIUM BROMIDE AND ALBUTEROL SULFATE 3 ML: .5; 3 SOLUTION RESPIRATORY (INHALATION) at 15:59

## 2021-01-01 RX ADMIN — DEXTROSE MONOHYDRATE 30 MCG/MIN: 50 INJECTION, SOLUTION INTRAVENOUS at 10:59

## 2021-01-01 RX ADMIN — VASOPRESSIN 0.1 UNITS/MIN: 20 INJECTION INTRAVENOUS at 12:26

## 2021-01-01 RX ADMIN — DEXTROSE MONOHYDRATE 12.5 G: 25 INJECTION, SOLUTION INTRAVENOUS at 00:14

## 2021-01-01 RX ADMIN — SODIUM CHLORIDE 100 ML/HR: 900 INJECTION, SOLUTION INTRAVENOUS at 07:50

## 2021-01-01 RX ADMIN — SERTRALINE 150 MG: 100 TABLET, FILM COATED ORAL at 08:34

## 2021-01-01 RX ADMIN — MORPHINE SULFATE 4 MG: 4 INJECTION INTRAVENOUS at 22:34

## 2021-01-01 RX ADMIN — MORPHINE SULFATE 1 MG: 2 INJECTION, SOLUTION INTRAMUSCULAR; INTRAVENOUS at 04:48

## 2021-01-01 RX ADMIN — SODIUM CHLORIDE 200 MG: 9 INJECTION, SOLUTION INTRAVENOUS at 09:25

## 2021-01-01 RX ADMIN — SODIUM CHLORIDE 25 ML/HR: 9 INJECTION, SOLUTION INTRAVENOUS at 09:15

## 2021-01-01 RX ADMIN — Medication 10 ML: at 22:34

## 2021-01-01 RX ADMIN — SODIUM CHLORIDE 1150 MG: 9 INJECTION, SOLUTION INTRAVENOUS at 10:27

## 2021-01-01 RX ADMIN — HYDROCORTISONE SODIUM SUCCINATE 50 MG: 100 INJECTION, POWDER, FOR SOLUTION INTRAMUSCULAR; INTRAVENOUS at 12:38

## 2021-01-01 RX ADMIN — DEXTROSE MONOHYDRATE 30 MCG/MIN: 50 INJECTION, SOLUTION INTRAVENOUS at 05:42

## 2021-01-01 RX ADMIN — VASOPRESSIN 0.1 UNITS/MIN: 20 INJECTION INTRAVENOUS at 16:26

## 2021-01-01 RX ADMIN — IPRATROPIUM BROMIDE AND ALBUTEROL SULFATE 3 ML: .5; 3 SOLUTION RESPIRATORY (INHALATION) at 19:16

## 2021-01-01 RX ADMIN — CALCIUM GLUCONATE 1000 MG: 20 INJECTION, SOLUTION INTRAVENOUS at 05:06

## 2021-01-01 RX ADMIN — DEXTROSE MONOHYDRATE 25 G: 25 INJECTION, SOLUTION INTRAVENOUS at 04:58

## 2021-01-01 RX ADMIN — PHENYLEPHRINE HYDROCHLORIDE 30 MCG/MIN: 10 INJECTION INTRAVENOUS at 05:27

## 2021-01-01 RX ADMIN — PANTOPRAZOLE SODIUM 40 MG: 40 TABLET, DELAYED RELEASE ORAL at 08:34

## 2021-01-01 RX ADMIN — Medication 10 ML: at 22:36

## 2021-01-01 RX ADMIN — MORPHINE SULFATE 1 MG: 2 INJECTION, SOLUTION INTRAMUSCULAR; INTRAVENOUS at 13:40

## 2021-01-01 RX ADMIN — VASOPRESSIN 0.09 UNITS/MIN: 20 INJECTION INTRAVENOUS at 06:25

## 2021-01-01 RX ADMIN — SODIUM CHLORIDE 25 ML/HR: 900 INJECTION, SOLUTION INTRAVENOUS at 07:25

## 2021-01-01 RX ADMIN — IPRATROPIUM BROMIDE AND ALBUTEROL SULFATE 3 ML: .5; 3 SOLUTION RESPIRATORY (INHALATION) at 03:12

## 2021-01-01 RX ADMIN — SODIUM CHLORIDE 100 ML: 900 INJECTION, SOLUTION INTRAVENOUS at 13:19

## 2021-01-01 RX ADMIN — VASOPRESSIN 0.08 UNITS/MIN: 20 INJECTION INTRAVENOUS at 06:15

## 2021-01-01 RX ADMIN — Medication 10 ML: at 10:21

## 2021-01-01 RX ADMIN — ONDANSETRON 4 MG: 2 INJECTION INTRAMUSCULAR; INTRAVENOUS at 11:27

## 2021-01-01 RX ADMIN — DEXAMETHASONE SODIUM PHOSPHATE 8 MG: 4 INJECTION, SOLUTION INTRAMUSCULAR; INTRAVENOUS at 08:32

## 2021-01-01 RX ADMIN — HYDROCORTISONE SODIUM SUCCINATE 50 MG: 100 INJECTION, POWDER, FOR SOLUTION INTRAMUSCULAR; INTRAVENOUS at 12:39

## 2021-01-01 RX ADMIN — IPRATROPIUM BROMIDE AND ALBUTEROL SULFATE 3 ML: .5; 3 SOLUTION RESPIRATORY (INHALATION) at 08:13

## 2021-01-01 RX ADMIN — SODIUM CHLORIDE 1100 MG: 9 INJECTION, SOLUTION INTRAVENOUS at 08:55

## 2021-01-01 RX ADMIN — CYANOCOBALAMIN 1000 MCG: 1000 INJECTION, SOLUTION INTRAMUSCULAR; SUBCUTANEOUS at 09:29

## 2021-01-01 RX ADMIN — ONDANSETRON 4 MG: 2 INJECTION INTRAMUSCULAR; INTRAVENOUS at 23:55

## 2021-01-01 RX ADMIN — MORPHINE SULFATE 4 MG: 4 INJECTION INTRAVENOUS at 21:34

## 2021-01-01 RX ADMIN — ONDANSETRON 8 MG: 2 INJECTION INTRAMUSCULAR; INTRAVENOUS at 08:30

## 2021-01-01 RX ADMIN — MORPHINE SULFATE 4 MG: 4 INJECTION INTRAVENOUS at 16:44

## 2021-01-01 RX ADMIN — METHYLPREDNISOLONE SODIUM SUCCINATE 125 MG: 125 INJECTION, POWDER, FOR SOLUTION INTRAMUSCULAR; INTRAVENOUS at 09:58

## 2021-01-01 RX ADMIN — IPRATROPIUM BROMIDE AND ALBUTEROL SULFATE 3 ML: .5; 3 SOLUTION RESPIRATORY (INHALATION) at 20:18

## 2021-01-01 RX ADMIN — DEXTROSE MONOHYDRATE 30 MCG/MIN: 50 INJECTION, SOLUTION INTRAVENOUS at 13:26

## 2021-01-01 RX ADMIN — CEFEPIME HYDROCHLORIDE 2 G: 2 INJECTION, POWDER, FOR SOLUTION INTRAVENOUS at 20:59

## 2021-01-01 RX ADMIN — CYANOCOBALAMIN 1000 MCG: 1000 INJECTION, SOLUTION INTRAMUSCULAR; SUBCUTANEOUS at 07:42

## 2021-01-01 RX ADMIN — DEXAMETHASONE SODIUM PHOSPHATE 8 MG: 4 INJECTION, SOLUTION INTRAMUSCULAR; INTRAVENOUS at 10:05

## 2021-01-01 RX ADMIN — SODIUM BICARBONATE 50 MEQ: 84 INJECTION, SOLUTION INTRAVENOUS at 05:19

## 2021-01-01 RX ADMIN — VANCOMYCIN HYDROCHLORIDE 2500 MG: 10 INJECTION, POWDER, LYOPHILIZED, FOR SOLUTION INTRAVENOUS at 21:42

## 2021-01-01 RX ADMIN — DIPHENHYDRAMINE HYDROCHLORIDE 25 MG: 25 CAPSULE ORAL at 06:28

## 2021-01-01 RX ADMIN — IPRATROPIUM BROMIDE AND ALBUTEROL SULFATE 3 ML: .5; 3 SOLUTION RESPIRATORY (INHALATION) at 11:30

## 2021-01-01 RX ADMIN — HYDROCORTISONE SODIUM SUCCINATE 50 MG: 100 INJECTION, POWDER, FOR SOLUTION INTRAMUSCULAR; INTRAVENOUS at 00:10

## 2021-01-01 RX ADMIN — MORPHINE SULFATE 4 MG: 4 INJECTION INTRAVENOUS at 18:57

## 2021-01-01 RX ADMIN — SODIUM CHLORIDE 1100 MG: 9 INJECTION, SOLUTION INTRAVENOUS at 10:10

## 2021-01-01 RX ADMIN — DEXAMETHASONE SODIUM PHOSPHATE 8 MG: 4 INJECTION, SOLUTION INTRAMUSCULAR; INTRAVENOUS at 09:55

## 2021-01-01 RX ADMIN — Medication 10 ML: at 09:25

## 2021-01-01 RX ADMIN — DEXTROSE MONOHYDRATE 25 MCG/MIN: 50 INJECTION, SOLUTION INTRAVENOUS at 04:33

## 2021-01-01 RX ADMIN — SODIUM CHLORIDE 500 ML: 900 INJECTION, SOLUTION INTRAVENOUS at 16:49

## 2021-01-01 RX ADMIN — Medication 10 ML: at 13:20

## 2021-01-01 RX ADMIN — SODIUM BICARBONATE 100 MEQ: 84 INJECTION, SOLUTION INTRAVENOUS at 09:58

## 2021-01-01 RX ADMIN — MORPHINE SULFATE 1 MG: 2 INJECTION, SOLUTION INTRAMUSCULAR; INTRAVENOUS at 12:38

## 2021-01-01 RX ADMIN — IPRATROPIUM BROMIDE AND ALBUTEROL SULFATE 3 ML: .5; 3 SOLUTION RESPIRATORY (INHALATION) at 00:10

## 2021-01-01 RX ADMIN — Medication 10 ML: at 13:19

## 2021-01-01 RX ADMIN — DEXAMETHASONE SODIUM PHOSPHATE 8 MG: 4 INJECTION, SOLUTION INTRAMUSCULAR; INTRAVENOUS at 07:37

## 2021-01-01 RX ADMIN — FOLIC ACID 1 MG: 1 TABLET ORAL at 08:35

## 2021-01-01 RX ADMIN — VASOPRESSIN 0.09 UNITS/MIN: 20 INJECTION INTRAVENOUS at 05:17

## 2021-01-01 RX ADMIN — ACETAMINOPHEN 1000 MG: 500 TABLET, FILM COATED ORAL at 19:52

## 2021-01-14 NOTE — PROGRESS NOTES
1/14/2021  Pt seen with Daniel BONE follow up lung cancer. Due for Alimta/Keytruda Saturday, tolerating well. States N/V better, not needing meds for nausea at this time. Encouraged pt to increase hydration, verbalized understanding. Continue to Infusion for treatment. Encouraged to call with any questions/concerns. Navigation will continue to follow.

## 2021-01-15 NOTE — ADDENDUM NOTE
Encounter addended by: Jan Koenig [facility] on: 1/15/2021 10:08 AM   Actions taken: i-Vent created or edited

## 2021-01-16 NOTE — PROGRESS NOTES
Arrived to the [facility]. Meme Mena completed. Patient tolerated well. Any issues or concerns during appointment: none. Discharged ambulatory.  
 
Therese Ramires RN

## 2021-01-16 NOTE — ADDENDUM NOTE
Encounter addended by: Taylor Ortega on: 1/16/2021 9:04 AM   Actions taken: i-Jania created or edited

## 2021-02-04 NOTE — PROGRESS NOTES
2/4/21 saw pt today with Dr. Vikas Jay for pre chemo cycle 9 alimta/keytruda. He is tolerating treatment well. PO intake is good. Kidney function up, discussed the importance of hydration. Infusion on Saturday including B12. Follow up in 3 weeks. Encouraged to call with any concerns. Navigation will continue to follow.

## 2021-02-06 NOTE — PROGRESS NOTES
Problem: Knowledge Deficit Goal: *Verbalizes understanding of procedures and medications Outcome: Progressing Towards Goal

## 2021-02-06 NOTE — PROGRESS NOTES
Arrived to the [facility]. Chemo & B12 completed. Patient tolerated well. Any issues or concerns during appointment: well. Patient aware of next infusion appointment on 2/27 (date) at 0900 (time). Discharged ambulatory in stable condition.

## 2021-02-12 NOTE — ED TRIAGE NOTES
Pt states increased SOB for about three days. States hx of lung ca and has been getting chemo until a few months ago. States he talked with PCP yesterday about the increased SOB and told to come to the ER for evaluation if it was worse this morning. Pt does not use O2 at home. Pt has  Mask for triage.

## 2021-02-12 NOTE — ED PROVIDER NOTES
66-year-old male with history of lung cancer who has increasing shortness of breath of the last day or 2. He has a cough with no sputum production. Patient was a cigarette smoker but stopped patient has had radiation therapy chemotherapy is currently on pembrolizumab pemetrexed therapy. Oncology note:  
 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 He has evident metastatic disease to a subcutaneous lesion and a solitary pancreatic lesion. In addition, he was subsequently found to have brain lesions. These 3 lesions were irradiated. He received 4 cycles of pemetrexed pembrolizumab and carboplatin with a mixed response on PET scanning. Patient was seen virtual visit yesterday with primary care doctor 38 an upper respiratory illness with placement azithromycin. Shortness of Breath This is a new problem. The average episode lasts 2 days. The problem occurs intermittently. The current episode started 2 days ago. The problem has been gradually worsening. Associated symptoms include cough. Pertinent negatives include no fever, no headaches, no sputum production, no hemoptysis, no wheezing, no orthopnea, no chest pain and no syncope. It is unknown what precipitated the problem. He has tried nothing for the symptoms. He has had prior hospitalizations. He has had prior ED visits. Associated medical issues include chronic lung disease. Past Medical History:  
Diagnosis Date  Brain metastases ([address] 06/23/2020  Cancer ([facility] [address] lung  Hypertension   
 managed with meds  Psychiatric disorder   
 depression Past Surgical History:  
Procedure Laterality Date  HX ORTHOPAEDIC    
 right knee  HX VASCULAR ACCESS    
 IR BX PANCREAS NEEDLE PERC    
 IR INSERT TUNL CVC W PORT OVER 5 YEARS  5/5/2020 History reviewed. No pertinent family history. Social History Socioeconomic History  Marital status:  Spouse name: Not on file  Number of children: Not on file  Years of education: Not on file  Highest education level: Not on file Occupational History  Not on file Social Needs  Financial resource strain: Not on file  Food insecurity Worry: Not on file Inability: Not on file  Transportation needs Medical: Not on file Non-medical: Not on file Tobacco Use  Smoking status: Former Smoker Packs/day: 1.50 Years: 25.00 Pack years: 37.50 Quit date:  Years since quittin.1  Smokeless tobacco: Former User Quit date: 2005 Substance and Sexual Activity  Alcohol use: Yes Comment: socially  Drug use: Not Currently  Sexual activity: Not on file Lifestyle  Physical activity Days per week: Not on file Minutes per session: Not on file  Stress: Not on file Relationships  Social connections Talks on phone: Not on file Gets together: Not on file Attends Oriental orthodox service: Not on file Active member of club or organization: Not on file Attends meetings of clubs or organizations: Not on file Relationship status: Not on file  Intimate partner violence Fear of current or ex partner: Not on file Emotionally abused: Not on file Physically abused: Not on file Forced sexual activity: Not on file Other Topics Concern [address] Service Not Asked  Blood Transfusions Not Asked  Caffeine Concern Not Asked  Occupational Exposure Not Asked Berenice Keke Hazards Not Asked  Sleep Concern Not Asked  Stress Concern Not Asked  Weight Concern Not Asked  Special Diet Not Asked  Back Care Not Asked  Exercise Not Asked  Bike Helmet Not Asked  Seat Belt Not Asked  Self-Exams Not Asked Social History Narrative  Not on file ALLERGIES: Patient has no known allergies. Review of Systems Constitutional: Negative. Negative for activity change and fever. HENT: Negative. Eyes: Negative. Respiratory: Positive for cough and shortness of breath. Negative for hemoptysis, sputum production and wheezing. Cardiovascular: Negative. Negative for chest pain, orthopnea and syncope. Gastrointestinal: Negative. Genitourinary: Negative. Musculoskeletal: Negative. Skin: Negative. Neurological: Negative. Negative for headaches. Psychiatric/Behavioral: Negative. All other systems reviewed and are negative. Vitals:  
 02/12/21 1008 BP: (!) 110/56 Pulse: 78 Resp: 20 Temp: 97.8 °F (36.6 °C) SpO2: 97% Weight: 103.9 kg (229 lb) Height: 5' 11\" (1.803 m) Physical Exam 
Vitals signs and nursing note reviewed. Constitutional:   
   General: He is not in acute distress. Appearance: Normal appearance. He is well-developed. He is not ill-appearing, toxic-appearing or diaphoretic. HENT:  
   Head: Normocephalic and atraumatic. No right periorbital erythema or left periorbital erythema. Jaw: There is normal jaw occlusion. Salivary Glands: Right salivary gland is not diffusely enlarged. Left salivary gland is not diffusely enlarged. Right Ear: External ear normal.  
   Left Ear: External ear normal.  
   Nose: Nose normal. No congestion or rhinorrhea. Mouth/Throat:  
   Mouth: Mucous membranes are moist.  
   Pharynx: No oropharyngeal exudate or posterior oropharyngeal erythema. Eyes:  
   General: Lids are normal. No scleral icterus. Right eye: No discharge. Left eye: No discharge. Extraocular Movements: Extraocular movements intact. Conjunctiva/sclera: Conjunctivae normal.  
   Right eye: Right conjunctiva is not injected. Left eye: Left conjunctiva is not injected. Pupils: Pupils are equal, round, and reactive to light. Neck: Musculoskeletal: Full passive range of motion without pain, normal range of motion and neck supple. Normal range of motion. No erythema, neck rigidity, injury, pain with movement or muscular tenderness. Thyroid: No thyroid mass. Vascular: No JVD. Trachea: Trachea and phonation normal.  
Cardiovascular:  
   Rate and Rhythm: Normal rate and regular rhythm. Pulses: Normal pulses. Heart sounds: Normal heart sounds. Heart sounds not distant. No murmur. No friction rub. No gallop. Pulmonary:  
   Effort: Pulmonary effort is normal. No tachypnea, accessory muscle usage, respiratory distress or retractions. Breath sounds: No stridor. No decreased breath sounds, wheezing, rhonchi or rales. Chest:  
   Chest wall: No tenderness. Abdominal:  
   General: Abdomen is flat. Bowel sounds are normal. There is no distension. There are no signs of injury. Palpations: Abdomen is soft. There is no fluid wave, mass or pulsatile mass. Tenderness: There is no abdominal tenderness. There is no guarding or rebound. Musculoskeletal: Normal range of motion. General: No swelling, tenderness, deformity or signs of injury. Right lower leg: No edema. Left lower leg: No edema. Lymphadenopathy:  
   Cervical: No cervical adenopathy. Skin: 
   General: Skin is warm and dry. Capillary Refill: Capillary refill takes less than 2 seconds. Coloration: Skin is not jaundiced or pale. Findings: No bruising, erythema or rash. Neurological:  
   General: No focal deficit present. Mental Status: He is alert and oriented to person, place, and time. Mental status is at baseline. GCS: GCS eye subscore is 4. GCS verbal subscore is 5. GCS motor subscore is 6. Cranial Nerves: No dysarthria or facial asymmetry. Sensory: No sensory deficit. Motor: No weakness or tremor.   
   Coordination: Coordination normal.  
Psychiatric:     
 Mood and Affect: Mood normal.     
   Behavior: Behavior normal. Behavior is cooperative. Thought Content: Thought content normal.     
   Judgment: Judgment normal.  
 
  
 
MDM Number of Diagnoses or Management Options Diagnosis management comments: No obvious distress while lying supine in the bed. No peripheral edema. No previous history of CHF. Discussed with Dr. Tatiana Tracy the patient's oncologist we will monitor the patient to see if he gets hypoxic with exertion if he does that we will try arrange home O2. Dr. Tatiana Tracy does not believe this patient needs to be admitted regardless of the supplemental oxygen need for tonight. Patient will just need to not exert himself until we have oxygen delivered. Amount and/or Complexity of Data Reviewed Clinical lab tests: ordered and reviewed Tests in the radiology section of CPT®: ordered and reviewed Tests in the medicine section of CPT®: ordered and reviewed Decide to obtain previous medical records or to obtain history from someone other than the patient: yes Review and summarize past medical records: yes Independent visualization of images, tracings, or specimens: yes Risk of Complications, Morbidity, and/or Mortality Presenting problems: high Diagnostic procedures: high Management options: high Patient Progress Patient progress: stable Procedures

## 2021-02-15 NOTE — DISCHARGE INSTRUCTIONS
I discussed our findings with one of the oncologist who recommended a short course of steroids, albuterol inhaler, and antibiotics. Contact Dr. Shahrzad Hdz office tomorrow for a follow-up appointment. Return to the ER should you worsen prior to that reevaluation.

## 2021-02-15 NOTE — ED NOTES
I have reviewed discharge instructions with the patient. The patient verbalized understanding. Patient left ED via Discharge Method: ambulatory to Home with self Opportunity for questions and clarification provided. Patient given 3 scripts. To continue your aftercare when you leave the hospital, you may receive an automated call from our care team to check in on how you are doing. This is a free service and part of our promise to provide the best care and service to meet your aftercare needs.  If you have questions, or wish to unsubscribe from this service please call [phone]. Thank you for Choosing our [facility] Emergency Department.

## 2021-02-15 NOTE — ED PROVIDER NOTES
68-year-old male presenting for exertional dyspnea. Reports that he is had it for quite some time because he has known lung cancer but became worse over the past for 5 days. Was seen in the emergency department 3 days ago for the same thing. Patient's not really improved over the weekend but then he felt even worse this morning. Got out of bed was short of breath even just getting out of bed. Try to walk on take out some trash to the garbage can and became acutely short of breath. She does anything to become short of breath now. Up until last week the patient's working without any significant dyspnea upon exertion. Denies other symptoms such as fevers, chills, cough. He does wheeze sometimes and has an inhaler for this that he is used. The history is provided by the patient. Shortness of Breath This is a recurrent problem. The current episode started more than 2 days ago. The problem has not changed since onset. Associated symptoms include wheezing and orthopnea. Pertinent negatives include no fever, no headaches, no coryza, no rhinorrhea, no sore throat, no swollen glands, no ear pain, no neck pain, no cough, no sputum production, no hemoptysis, no PND, no chest pain, no syncope, no vomiting, no abdominal pain, no rash, no leg pain, no leg swelling and no claudication. It is unknown what precipitated the problem. He has tried nothing for the symptoms. The treatment provided no relief. He has had no prior hospitalizations. He has had prior ED visits. He has had no prior ICU admissions. Associated medical issues comments: Lung CA. Past Medical History:  
Diagnosis Date  Brain metastases ([facility] [address] 06/23/2020  Cancer ([facility] [address] lung  Hypertension   
 managed with meds  Psychiatric disorder   
 depression Past Surgical History:  
Procedure Laterality Date  HX ORTHOPAEDIC    
 right knee  HX VASCULAR ACCESS    
 IR BX PANCREAS NEEDLE PERC    
  IR INSERT TUNL CVC W PORT OVER 5 YEARS  2020 No family history on file. Social History Socioeconomic History  Marital status:  Spouse name: Not on file  Number of children: Not on file  Years of education: Not on file  Highest education level: Not on file Occupational History  Not on file Social Needs  Financial resource strain: Not on file  Food insecurity Worry: Not on file Inability: Not on file  Transportation needs Medical: Not on file Non-medical: Not on file Tobacco Use  Smoking status: Former Smoker Packs/day: 1.50 Years: 25.00 Pack years: 37.50 Quit date:  Years since quittin.1  Smokeless tobacco: Former User Quit date: 2005 Substance and Sexual Activity  Alcohol use: Yes Comment: socially  Drug use: Not Currently  Sexual activity: Not on file Lifestyle  Physical activity Days per week: Not on file Minutes per session: Not on file  Stress: Not on file Relationships  Social connections Talks on phone: Not on file Gets together: Not on file Attends Spiritism service: Not on file Active member of club or organization: Not on file Attends meetings of clubs or organizations: Not on file Relationship status: Not on file  Intimate partner violence Fear of current or ex partner: Not on file Emotionally abused: Not on file Physically abused: Not on file Forced sexual activity: Not on file Other Topics Concern [address] Service Not Asked  Blood Transfusions Not Asked  Caffeine Concern Not Asked  Occupational Exposure Not Asked [employer] Hazards Not Asked  Sleep Concern Not Asked  Stress Concern Not Asked  Weight Concern Not Asked  Special Diet Not Asked  Back Care Not Asked  Exercise Not Asked  Bike Helmet Not Asked  Seat Belt Not Asked  Self-Exams Not Asked Social History Narrative  Not on file ALLERGIES: Patient has no known allergies. Review of Systems Constitutional: Negative for fever. HENT: Negative for ear pain, rhinorrhea and sore throat. Respiratory: Positive for shortness of breath and wheezing. Negative for cough, hemoptysis and sputum production. Cardiovascular: Positive for orthopnea. Negative for chest pain, claudication, leg swelling, syncope and PND. Gastrointestinal: Negative for abdominal pain and vomiting. Musculoskeletal: Negative for neck pain. Skin: Negative for rash. Neurological: Negative for headaches. All other systems reviewed and are negative. Vitals:  
 02/15/21 9431 BP: (!) 107/57 Pulse: 95 Resp: 20 Temp: 97.6 °F (36.4 °C) SpO2: 92% Weight: 103.9 kg (229 lb) Height: 5' 11\" (1.803 m) Physical Exam 
Vitals signs and nursing note reviewed. Constitutional:   
   Appearance: He is well-developed. HENT:  
   Head: Normocephalic and atraumatic. Eyes:  
   Conjunctiva/sclera: Conjunctivae normal.  
   Pupils: Pupils are equal, round, and reactive to light. Neck: Musculoskeletal: Normal range of motion and neck supple. Cardiovascular:  
   Rate and Rhythm: Normal rate and regular rhythm. Heart sounds: Normal heart sounds. Pulmonary:  
   Effort: Pulmonary effort is normal.  
   Breath sounds: Examination of the right-lower field reveals decreased breath sounds. Decreased breath sounds present. Abdominal:  
   General: Bowel sounds are normal.  
   Palpations: Abdomen is soft. Musculoskeletal: Normal range of motion. General: No deformity. Skin: 
   General: Skin is warm and dry. Neurological:  
   Mental Status: He is alert and oriented to person, place, and time. Cranial Nerves: No cranial nerve deficit. Psychiatric:     
   Behavior: Behavior normal.  
 
  
 
MDM Number of Diagnoses or Management Options Acute bronchitis, unspecified organism Diagnosis management comments: 39-year-old male presenting for persistent and worsening dyspnea upon exertion in the setting of lung cancer. Concern for mucous plugging, pleural effusion, bronchiectasis, pneumonia, COVID-19, reactive airway disease. Amount and/or Complexity of Data Reviewed Clinical lab tests: ordered and reviewed (Results for orders placed or performed during the hospital encounter of 02/15/21 
-CBC WITH AUTOMATED DIFF Result                      Value             Ref Range WBC                         2.0 (LL)          4.3 - 11.1 K* 
     RBC                         2.39 (L)          4.23 - 5.6 M* HGB                         8.2 (L)           13.6 - 17.2 * HCT                         24.4 (L)          41.1 - 50.3 % MCV                         102.1 (H)         79.6 - 97.8 * MCH                         34.3 (H)          26.1 - 32.9 * MCHC                        33.6              31.4 - 35.0 * RDW                         17.7 (H)          11.9 - 14.6 % PLATELET                    145 (L)           150 - 450 K/* MPV                         8.6 (L)           9.4 - 12.3 FL ABSOLUTE NRBC               0.00              0.0 - 0.2 K/* DF                          AUTOMATED NEUTROPHILS                 41 (L)            43 - 78 % LYMPHOCYTES                 46 (H)            13 - 44 % MONOCYTES                   10                4.0 - 12.0 % EOSINOPHILS                 2                 0.5 - 7.8 % BASOPHILS                   1                 0.0 - 2.0 % IMMATURE GRANULOCYTES       2                 0.0 - 5.0 %   
     ABS. NEUTROPHILS            0.8 (L)           1.7 - 8.2 K/* ABS. LYMPHOCYTES            0.9               0.5 - 4.6 K/* ABS. MONOCYTES              0.2               0.1 - 1.3 K/* ABS. EOSINOPHILS            0.0               0.0 - 0.8 K/* ABS. BASOPHILS              0.0               0.0 - 0.2 K/* ABS. IMM. GRANS.            0.0               0.0 - 0.5 K/* 
-METABOLIC PANEL, COMPREHENSIVE Result                      Value             Ref Range Sodium                      135 (L)           136 - 145 mm* Potassium                   3.6               3.5 - 5.1 mm* Chloride                    99                98 - 107 mmo* CO2                         27                21 - 32 mmol* Anion gap                   9                 7 - 16 mmol/L Glucose                     127 (H)           65 - 100 mg/* BUN                         15                6 - 23 MG/DL Creatinine                  1.49              0.8 - 1.5 MG* 
     GFR est AA                  >60               >60 ml/min/1* GFR est non-AA              52 (L)            >60 ml/min/1* Calcium                     9.2               8.3 - 10.4 M* Bilirubin, total            0.7               0.2 - 1.1 MG* ALT (SGPT)                  101 (H)           12 - 65 U/L   
     AST (SGOT)                  72 (H)            15 - 37 U/L Alk. phosphatase            173 (H)           50 - 136 U/L Protein, total              7.5               6.3 - 8.2 g/* Albumin                     3.3 (L)           3.5 - 5.0 g/* Globulin                    4.2 (H)           2.3 - 3.5 g/* A-G Ratio                   0.8 (L)           1.2 - 3.5     
) Tests in the radiology section of CPT®: ordered and reviewed (Xr Chest Pa Lat Result Date: 2/15/2021 Chest 2 view CLINICAL INDICATION: Subacute progressive worsening moderate dyspnea with exertion and shortness of breath. History of right hilar lung cancer with regional adenopathy, left basilar atelectasis, left chest wall mass. COMPARISON: CT 2/12/2021, PET 9/29/2020, radiograph 6/16/2020 TECHNIQUE: Upright PA  and lateral views of the chest FINDINGS: Lung volumes are stable, chronic right hemidiaphragm elevation again noted. Cardiomediastinal silhouette and hilar contours are stable and further evaluated on recent CT. The right hilar and perihilar masslike opacity compatible with known malignancy was further evaluated on recent CT and has not definitely changed. Slight upper lobe predominant emphysematous changes are again evident. The lungs demonstrate mild linear atelectasis in the bases but no dense consolidation, focal infiltrate, pleural effusion, pneumothorax, or CHF. No acute osseous abnormalities are seen. 1. Mild bibasilar atelectasis. No consolidation. 2. Known right lung malignancy further evaluated on recent CT. Ct Chest Abd Pelv W Cont Result Date: 2/12/2021 Exam: CT CHEST ABD PELV W CONT on 2/12/2021 1:49 PM Clinical History: The Male patient is 60years old  presenting for increased SOB for about three days. States hx of lung ca and has been getting chemo until a few months ago. States he talked with PCP yesterday about the increased SOB and told to come to the ER for evaluation if it was worse this morning. Pt does not use O2 at home. Pt has  Mask for triage. . TECHNIQUE:  Thin slice axial pre and post contrast images were obtained through the chest, abdomen and pelvis without oral contrast.  Coronal reformatted images were also provided for review. A total of 100 ml of Iopamidol (ISOVUE-370) 76 % contrast was administered intravenously. All CT scans at this facility are performed using dose reduction/dose modulation techniques, as appropriate the performed exam, including the following: Automated Exposure Control; Adjustment of the mA and/or kV according to patient size (this includes techniques or standardized protocols for targeted exams where dose is matched to indication/reason for exam); and Use of Iterative Reconstruction Technique. Radiation Exposure Indices: Reference Air Kerma (Eve Ax) = 2170 mGy-cm COMPARISON:  Chest abdomen pelvis CT 12/22/2020. FINDINGS:  Chest: Lungs: Emphysematous changes are seen with a continued large right hilar lung mass measuring approximately 4.5 x 3.0 cm. There is mild postobstructive atelectasis at the right lung base with a stable small pulmonary nodule at the peripheral right lower lobe measuring 3 mm. A nodular consolidation is seen at the posterior left lung base measuring 19 mm. Vasculature:  Normal enhancement is demonstrated. No evidence of aortic dissection or aneurysmal dilatation is seen. There is no evidence of pulmonary embolic disease. Cardiac: Normal size Mediastinum: Right hilar mass is confluent with adenopathy in this location.  There are small nonspecific subcarinal and right paratracheal lymph nodes measuring up to 14 mm which must be presumed neoplastic until proven otherwise Chest wall: There is a stable subcutaneous left mid chest wall mass measuring 2.7 cm Osseous structures; No acute abnormality Abdomen: Liver: Normal size, contour, density and enhancement without focal mass. Biliary: Unremarkable gallbladder. No evidence of intra or extrahepatic biliary dilatation. Pancreas: Normal in size and contour without focal lesion. Spleen: Normal in size and contour without focal lesion. Adrenal glands: Right adrenal nodule measures 16 x 9 mm, with left adrenal nodule measuring 20 x 14 mm. Kidneys: Symmetric without evidence of hydronephrosis or nephrolithiasis. Normal enhancement throughout all visualized phases of contrast excretion. Bowel: No evidence of obstruction or focal lesion. Normal appendix Retroperitoneum/vasculature: No evidence of significant adenopathy. Unremarkable aorta and IVC. Abdominal soft tissues: Unremarkable. Osseous structures: No acute osseous abnormality. Pelvis: Unremarkable bladder. No significant adenopathy or free fluid..  
 
1. Stable right hilar mass confluent with regional adenopathy is associated mildly enlarged mediastinal lymph nodes which must be considered neoplastic. 2. Developing nodular area of atelectasis at posterior left lung base. 3. Stable left anterior chest wall mass. 4. Stable bilateral adrenal nodules 5. No evidence of pulmonary embolic disease. (CPT code(s)  38108, 30444, 75535  
 
) 
Tests in the medicine section of CPT®: ordered and reviewed 
Decide to obtain previous medical records or to obtain history from someone other than the patient: yes 
Discuss the patient with other providers: yes (Discussed case with the oncologist on-call.) 
Independent visualization of images, tracings, or specimens: yes 
 
Risk of Complications, Morbidity, and/or Mortality 
Presenting problems: high 
Diagnostic procedures: high 
Management options: moderate 
 General comments: Dr. Poon discussed findings with me.  Given how patient appears clinically well and to the current chest x-ray, vital signs, blood work findings he is fine with trying a short course of doxycycline, prednisone and an albuterol inhaler.  The patient can reach out to Dr. Washington's office and schedule a follow-up appointment for this week. 
 
I personally reviewed the patient's vital signs, laboratory tests, and/or radiological findings.  I discussed these findings with the patient and their significance.  I answered all questions and gave the patient clear return precautions.  The patient was discharged from the emergency department in stable condition 
 
 
 
Patient Progress 
Patient progress: improved 
 
ED Course as of Feb 15 1004  
Mon Feb 15, 2021  
0838 Patient's white blood cell count critically low.  Hemoglobin is down a gram since 3 days ago.  
 [JS]  
0845 Discussed patient's white blood cell count with him and he reports that he is on oral chemotherapy at this time but no infusions.  
 [JS]  
0845 Reviewed the patient's chest x-ray and I see no acute abnormality.  
 [JS]  
0920 Consulting hematology oncology to discuss findings today.  
 [JS]  
  
ED Course User Index 
[JS] Jasbir Sandoval MD  
 
 
Procedures

## 2021-02-16 NOTE — PROGRESS NOTES
2/16/21 - pt called to give us an update on recent visits to ER.  2/12 and 2/15 went to ER for shortness of breath. Diagnosed with bronchitis - inhaler and 2 antibiotics. Today he stated is day 2 of regimen and he feels much better. PO intake is good. Shortness of breath much improved. Follow up as scheduled next week. He will call with any concerns prior. Navigation will continue to follow.

## 2021-02-21 NOTE — ED PROVIDER NOTES
Pt returns to er c/o sob on excertion for past 2 weeks,has had chest ct, chest x ray and labs,all w/o acute process,  currently being treat for bronchitis with doxy and prednisone and mdi , no cp, no symptoms at rest, has appt with pmd and oncology next week The history is provided by the patient. Shortness of Breath This is a new problem. The average episode lasts 2 weeks. The problem occurs continuously. The current episode started more than 1 week ago. The problem has not changed since onset. Pertinent negatives include no cough and no sputum production. It is unknown what precipitated the problem. He has tried ipratropium inhalers (abx) for the symptoms. The treatment provided mild relief. He has had no prior hospitalizations. He has had prior ED visits. He has had no prior ICU admissions. Associated medical issues comments: lung ca . Past Medical History:  
Diagnosis Date  Brain metastases ([facility] [address] 2020  Cancer ([facility] [address] lung  Hypertension   
 managed with meds  Psychiatric disorder   
 depression Past Surgical History:  
Procedure Laterality Date  HX ORTHOPAEDIC    
 right knee  HX VASCULAR ACCESS    
 IR BX PANCREAS NEEDLE PERC    
 IR INSERT TUNL CVC W PORT OVER 5 YEARS  2020 History reviewed. No pertinent family history. Social History Socioeconomic History  Marital status:  Spouse name: Not on file  Number of children: Not on file  Years of education: Not on file  Highest education level: Not on file Occupational History  Not on file Social Needs  Financial resource strain: Not on file  Food insecurity Worry: Not on file Inability: Not on file  Transportation needs Medical: Not on file Non-medical: Not on file Tobacco Use  Smoking status: Former Smoker Packs/day: 1.50 Years: 25.00 Pack years: 37.50 Quit date:  Years since quittin.1  Smokeless tobacco: Former User Quit date: 4/16/2005 Substance and Sexual Activity  Alcohol use: Yes Comment: socially  Drug use: Not Currently  Sexual activity: Not on file Lifestyle  Physical activity Days per week: Not on file Minutes per session: Not on file  Stress: Not on file Relationships  Social connections Talks on phone: Not on file Gets together: Not on file Attends Orthodoxy service: Not on file Active member of club or organization: Not on file Attends meetings of clubs or organizations: Not on file Relationship status: Not on file  Intimate partner violence Fear of current or ex partner: Not on file Emotionally abused: Not on file Physically abused: Not on file Forced sexual activity: Not on file Other Topics Concern [address] Service Not Asked  Blood Transfusions Not Asked  Caffeine Concern Not Asked  Occupational Exposure Not Asked [place] Hazards Not Asked  Sleep Concern Not Asked  Stress Concern Not Asked  Weight Concern Not Asked  Special Diet Not Asked  Back Care Not Asked  Exercise Not Asked  Bike Helmet Not Asked  Seat Belt Not Asked  Self-Exams Not Asked Social History Narrative  Not on file ALLERGIES: Patient has no known allergies. Review of Systems Respiratory: Positive for shortness of breath. Negative for cough and sputum production. All other systems reviewed and are negative. Vitals:  
 02/21/21 1010 BP: 122/71 Pulse: 87 Resp: 16 Temp: 98.2 °F (36.8 °C) SpO2: 99% Weight: 104.3 kg (230 lb) Height: 5' 11\" (1.803 m) Physical Exam 
Vitals signs and nursing note reviewed. Constitutional:   
   General: He is not in acute distress. Appearance: He is well-developed and normal weight. He is not diaphoretic. HENT:  
   Head: Normocephalic and atraumatic. Eyes: Pupils: Pupils are equal, round, and reactive to light. Neck: Musculoskeletal: Normal range of motion and neck supple. Cardiovascular:  
   Rate and Rhythm: Normal rate and regular rhythm. Pulmonary:  
   Effort: Pulmonary effort is normal. No tachypnea or respiratory distress. Breath sounds: Normal breath sounds. No stridor. No wheezing or rhonchi. Abdominal:  
   General: Bowel sounds are normal.  
   Palpations: Abdomen is soft. Musculoskeletal: Normal range of motion. Skin: 
   General: Skin is warm. Neurological:  
   Mental Status: He is alert and oriented to person, place, and time. MDM Number of Diagnoses or Management Options Diagnosis management comments: Cbc normal, cmp  With slight increase in bun creatine 
bnp normal, chest x ray w/o acute process, ekg nsr no signs of stemi Pt was given 1 liter ns for renal function Pt seen by Dr. Berenice Virgen, will refer to cardiology for MEYER, urged to push fluids, use mdi and Mucinex Keep appt next week with pnd and oncology Amount and/or Complexity of Data Reviewed Clinical lab tests: ordered and reviewed Tests in the radiology section of CPT®: ordered and reviewed Review and summarize past medical records: yes Discuss the patient with other providers: yes Risk of Complications, Morbidity, and/or Mortality Presenting problems: moderate Diagnostic procedures: moderate Management options: low Patient Progress Patient progress: improved Procedures

## 2021-02-21 NOTE — ED NOTES
I have reviewed discharge instructions with the patient. The patient verbalized understanding. Patient left ED via Discharge Method: ambulatory to Home with self. Opportunity for questions and clarification provided. Patient given 0 scripts. To continue your aftercare when you leave the hospital, you may receive an automated call from our care team to check in on how you are doing. This is a free service and part of our promise to provide the best care and service to meet your aftercare needs.  If you have questions, or wish to unsubscribe from this service please call [phone]. Thank you for Choosing our [facility] Emergency Department.

## 2021-02-21 NOTE — DISCHARGE INSTRUCTIONS
Expect A call from [facility] cardiology for appointment to further evaluate your shortness of breath, continue at home medications to include your MDI push fluids, will add Mucinex to your current medications, return if symptoms worsen

## 2021-02-24 NOTE — PROGRESS NOTES
I saw patient today with Patti Koenig NP. We will hold chemo this Sat on 2-27-21 due to patient recent bronchial infection. Heri Andersen will start pt on 60mg Prednisone daily and Protonix daily. Pt will also return in one week with recheck and possible chemo again if physically able. Encouraged pt to call with any signs or symptoms.  Nurse navigation will be following

## 2021-02-26 PROBLEM — J18.9 PNEUMONITIS: Status: ACTIVE | Noted: 2021-01-01

## 2021-03-04 NOTE — PROGRESS NOTES
3/4/21 saw pt today with Alison Sanabria NP for pre chemo cycle 11 keytruda/alimta. He is currently on predisone 60 mg daily. He feels much better this week. Will start steroid taper starting tomorrow, pt verbalized understanding. Alimta only on Saturday. PO intake is good. Denies any other issues. Follow up in 3 weeks. Encouraged to call with any concerns. Navigation will continue to follow.

## 2021-03-05 NOTE — ADDENDUM NOTE
Encounter addended by: Loc Pierce [facility] on: 3/5/2021 3:11 PM   Actions taken: Flowsheet accepted, i-Vent created or edited

## 2021-03-06 NOTE — PROGRESS NOTES
Pt arrived ambulatory today at 0738, to receive IV chemotherapy.  Pt tolerated without difficulty.  Patient discharged via ambulatory accompanied by self.  Instructed to notify physician of any problems, questions or concerns.  Allowed opportunity for patient/family to ask questions.  Verbalized understanding.  Next appointment is March 27 at 0900 with [facility].

## 2021-03-06 NOTE — PROGRESS NOTES
Problem: Knowledge Deficit Goal: *Verbalizes understanding of procedures and medications Outcome: Progressing Towards Goal 
  
Problem: Patient Education:  Go to Education Activity Goal: Patient/Family Education Outcome: Progressing Towards Goal 
  
Problem: Chemotherapy Treatment Goal: *Chemotherapy regimen followed Outcome: Progressing Towards Goal 
Goal: *Hemodynamically stable Outcome: Progressing Towards Goal 
Goal: *Tolerating diet Outcome: Progressing Towards Goal

## 2021-03-06 NOTE — ADDENDUM NOTE
Encounter addended by: Baron Patterson, [id number] [facility] on: 3/6/2021 8:09 AM   Actions taken: i-Vent created or edited

## 2021-04-01 NOTE — PROGRESS NOTES
4/1/21 saw pt today with Sidney Shipley NP for pre chemo alimta only. Keytruda on hold due to steroid dose of 60 mg. He feels like breathing is improved. Will start steroid taper to 50 mg tomorrow. Decrease by 10 mg every 7 days. Infusion Saturday. Follow up in 3 weeks. Encouraged to call with any concerns. Navigation will continue to follow.

## 2021-04-03 NOTE — ADDENDUM NOTE
Encounter addended by: Moises Ryan RP on: 4/3/2021 7:26 AM   Actions taken: [employer] created or edited

## 2021-04-03 NOTE — PROGRESS NOTES
Arrived to the [facility]. Assessment completed. Labs reviewed. Patient tolerated chemotherapy well. He also received Vitamin B12, as ordered. Any issues or concerns during appointment: noted creatinine level of 1.9. Call placed to Shen Cruz NP. She states that it is ok to proceed with chemotherapy today. Patient aware of next infusion appointment on 4/24/21 (date) at [phone] (time) with [facility] infusion center. Discharged ambulatory, per self. Patient instructed to call Dr. Ortega Seen office immediately for any problems or concerns. He verbalizes understanding.

## 2021-04-12 NOTE — ED TRIAGE NOTES
Pt arrived to ED via EMS from home. Per EMS pt c/o shortness of breath, generalized body aches and dry cough that started today. Per EMS pt has lung cancer. Pt had first covid vaccine one week ago. Per EMS 
184/92 
98% 3L  
101.3 axillary 98 oral 
 Will send to PCP for okay to write letter for daughter.

## 2021-04-12 NOTE — ED PROVIDER NOTES
49-year-old male with history of depression, hypertension, lung cancer with reported brain metastasis, status post initial abdominal COVID-19 vaccine on last Wednesday who presents via EMS with complaint of worsening shortness of breath, nonproductive cough, fever, chills, myalgias of the past several days. Patient states he is followed by Dr. Rocio Marte with Oncology. Denies chest pain, abdominal pain, nausea, vomiting, hemoptysis, chest pain, dizziness. States that he is not on supplemental O2 at baseline. Patient noted to be hypoxic with O2 sats 83% on room air. Patient currently requiring 5 to 6 L O2 via nasal cannula. The history is provided by the patient. No  was used. Shortness of Breath This is a new problem. The problem occurs continuously. The current episode started more than 2 days ago. The problem has not changed since onset. Associated symptoms include a fever and cough. Pertinent negatives include no headaches, no coryza, no sore throat, no swollen glands, no ear pain, no neck pain, no hemoptysis, no wheezing, no PND, no orthopnea, no chest pain, no syncope, no vomiting, no abdominal pain, no rash, no leg pain and no leg swelling. He has tried nothing for the symptoms. The treatment provided no relief. Past Medical History:  
Diagnosis Date  Brain metastases ([facility] [address] 06/23/2020  Cancer ([facility] [address] lung  Hypertension   
 managed with meds  Psychiatric disorder   
 depression Past Surgical History:  
Procedure Laterality Date  HX ORTHOPAEDIC    
 right knee  HX VASCULAR ACCESS    
 IR BX PANCREAS NEEDLE PERC    
 IR INSERT TUNL CVC W PORT OVER 5 YEARS  5/5/2020 No family history on file. Social History Socioeconomic History  Marital status:  Spouse name: Not on file  Number of children: Not on file  Years of education: Not on file  Highest education level: Not on file Occupational History  Not on file Social Needs  Financial resource strain: Not on file  Food insecurity Worry: Not on file Inability: Not on file  Transportation needs Medical: Not on file Non-medical: Not on file Tobacco Use  Smoking status: Former Smoker Packs/day: 1.50 Years: 25.00 Pack years: 37.50 Quit date:  Years since quittin.2  Smokeless tobacco: Former User Quit date: 2005 Substance and Sexual Activity  Alcohol use: Yes Comment: socially  Drug use: Not Currently  Sexual activity: Not on file Lifestyle  Physical activity Days per week: Not on file Minutes per session: Not on file  Stress: Not on file Relationships  Social connections Talks on phone: Not on file Gets together: Not on file Attends Congregation service: Not on file Active member of club or organization: Not on file Attends meetings of clubs or organizations: Not on file Relationship status: Not on file  Intimate partner violence Fear of current or ex partner: Not on file Emotionally abused: Not on file Physically abused: Not on file Forced sexual activity: Not on file Other Topics Concern [address] Service Not Asked  Blood Transfusions Not Asked  Caffeine Concern Not Asked  Occupational Exposure Not Asked Mely Donna Hazards Not Asked  Sleep Concern Not Asked  Stress Concern Not Asked  Weight Concern Not Asked  Special Diet Not Asked  Back Care Not Asked  Exercise Not Asked  Bike Helmet Not Asked  Seat Belt Not Asked  Self-Exams Not Asked Social History Narrative  Not on file ALLERGIES: Patient has no known allergies. Review of Systems Constitutional: Positive for chills, fatigue and fever. HENT: Negative for ear pain, sore throat and trouble swallowing. Respiratory: Positive for cough and shortness of breath. Negative for hemoptysis and wheezing.    
Cardiovascular: Negative for chest pain, orthopnea, leg swelling, syncope and PND. Gastrointestinal: Negative for abdominal pain, diarrhea, nausea and vomiting. Genitourinary: Negative for dysuria and flank pain. Musculoskeletal: Positive for myalgias. Negative for arthralgias, back pain and neck pain. Skin: Negative for color change and rash. Neurological: Negative for dizziness, syncope, weakness, light-headedness and headaches. Psychiatric/Behavioral: Negative for confusion. Vitals:  
 04/12/21 1914 BP: (!) 163/77 Pulse: (!) 111 Resp: (!) 34 Temp: 98.8 °F (37.1 °C) SpO2: 93% Weight: 99.3 kg (219 lb) Height: 5' 11\" (1.803 m) Physical Exam 
Vitals signs and nursing note reviewed. Constitutional:   
   Comments: Ill-appearing. HENT:  
   Head: Normocephalic. Mouth/Throat:  
   Mouth: Mucous membranes are moist.  
Eyes:  
   Extraocular Movements: Extraocular movements intact. Pupils: Pupils are equal, round, and reactive to light. Neck: Musculoskeletal: Normal range of motion. No neck rigidity. Cardiovascular:  
   Rate and Rhythm: Regular rhythm. Tachycardia present. Pulses: Normal pulses. Heart sounds: Normal heart sounds. Pulmonary:  
   Effort: Respiratory distress present. Comments: Diminished breath sounds noted bilaterally. Tachypneic. Abdominal:  
   General: Bowel sounds are normal.  
   Palpations: Abdomen is soft. Tenderness: There is no abdominal tenderness. There is no guarding or rebound. Comments: Soft, nontender, nondistended. No rebound or guarding. Musculoskeletal: Normal range of motion. Comments: No lower extremity edema. No calf tenderness. Skin: 
   General: Skin is warm. Findings: No erythema or rash. Neurological:  
   General: No focal deficit present. Mental Status: He is alert and oriented to person, place, and time. Motor: No weakness. Comments: No focal deficits.   No meningismus. MDM Number of Diagnoses or Management Options Acute respiratory failure with hypoxia [facility]): new and requires workup Hyponatremia: new and requires workup Malignant neoplasm of left lung, unspecified part of lung [facility]): new and requires workup Pancytopenia [facility]): new and requires workup Pneumonia of left lower lobe due to infectious organism: new and requires workup Diagnosis management comments: Patient requiring 5-6 L O2 via nasal cannula. Patient with noted pancytopenia. Hyponatremia with sodium 129. Orders placed for hydration, Tylenol 1 g p.o., cefepime 2 g IV, vancomycin. Will consult hospitalist for admission. [facility] Jorge Honeycutt has accepted transfer of patient to [facility]. EMTALA documentation completed. 
============================================= 
Patient with increased agitation and altered mental status, hypotension and hypoxia. I was not informed of this. Hospitalist Dr. Nish Pennington was updated on patient as patient has been admitted to hospitalidt service several hours prior and was awaiting transport to [facility] Discussed patient with Dr. Nish Pennington who states that she will closely monitor and possibly placed on BiPAP and likely admit to ICU. Amount and/or Complexity of Data Reviewed Clinical lab tests: ordered and reviewed Tests in the radiology section of CPT®: reviewed and ordered Tests in the medicine section of CPT®: ordered and reviewed Review and summarize past medical records: yes Discuss the patient with other providers: yes Independent visualization of images, tracings, or specimens: yes Risk of Complications, Morbidity, and/or Mortality Presenting problems: high Diagnostic procedures: high Management options: high Patient Progress Patient progress: other (comment) (Guarded ) 
 
ED Course as of Apr 12 2036 Mon Apr 12, 2021 1955 CXR FINDINGS: The right hemidiaphragm is elevated. A right-sided chest port is 
present with catheter tip at the cavoatrial junction. 
  
There is new consolidation in the left perihilar lung and left lung base. EKG 
leads are present. 
  
IMPRESSION New consolidation in the left lung.  
 [DF] ED Course User Index 
[DF] Jd Condon MD  
 
 
EKG Date/Time: 4/12/2021 9:10 PM 
Performed by: Jd Condon MD 
Authorized by: Jd Condon MD  
 
ECG reviewed by ED Physician in the absence of a cardiologist: yes Rate:  
  ECG rate:  107 ECG rate assessment: tachycardic Rhythm:  
  Rhythm: sinus tachycardia Ectopy:  
  Ectopy: none QRS:  
  QRS axis:  Normal 
  QRS intervals:  Normal 
Conduction:  
  Conduction: normal   
ST segments: ST segments:  Normal 
T waves:  
  T waves: normal   
 
 
 
 
Results Include: 
 
Recent Results (from the past 24 hour(s)) METABOLIC PANEL, COMPREHENSIVE Collection Time: 04/12/21  7:42 PM  
Result Value Ref Range Sodium 129 (L) 136 - 145 mmol/L Potassium 4.1 3.5 - 5.1 mmol/L Chloride 95 (L) 98 - 107 mmol/L  
 CO2 23 21 - 32 mmol/L Anion gap 11 7 - 16 mmol/L Glucose 100 65 - 100 mg/dL BUN 26 (H) 6 - 23 MG/DL Creatinine 1.62 (H) 0.8 - 1.5 MG/DL  
 GFR est AA 57 (L) >60 ml/min/1.73m2 GFR est non-AA 47 (L) >60 ml/min/1.73m2 Calcium 9.1 8.3 - 10.4 MG/DL Bilirubin, total 1.4 (H) 0.2 - 1.1 MG/DL  
 ALT (SGPT) 69 (H) 12 - 65 U/L  
 AST (SGOT) 45 (H) 15 - 37 U/L Alk. phosphatase 111 50 - 136 U/L Protein, total 6.9 6.3 - 8.2 g/dL Albumin 3.7 3.5 - 5.0 g/dL Globulin 3.2 2.3 - 3.5 g/dL A-G Ratio 1.2 1.2 - 3.5 SARS-COV-2 Collection Time: 04/12/21  7:42 PM  
Result Value Ref Range SARS-CoV-2 Please find results under separate order TROPONIN-HIGH SENSITIVITY Collection Time: 04/12/21  7:42 PM  
Result Value Ref Range Troponin-High Sensitivity 19.1 (H) 0 - 14 pg/mL COVID-19 RAPID TEST  Collection Time: 04/12/21  7:42 PM  
Result Value Ref Range Specimen source Nasopharyngeal    
 COVID-19 rapid test Not detected NOTD LACTIC ACID Collection Time: 04/12/21  7:44 PM  
Result Value Ref Range Lactic acid 2.0 0.4 - 2.0 MMOL/L  
CBC WITH AUTOMATED DIFF Collection Time: 04/12/21  7:44 PM  
Result Value Ref Range WBC 1.4 (LL) 4.3 - 11.1 K/uL  
 RBC 2.68 (L) 4.23 - 5.6 M/uL HGB 9.5 (L) 13.6 - 17.2 g/dL HCT 28.7 (L) 41.1 - 50.3 % .1 (H) 79.6 - 97.8 FL  
 MCH 35.4 (H) 26.1 - 32.9 PG  
 MCHC 33.1 31.4 - 35.0 g/dL  
 RDW 14.6 11.9 - 14.6 % PLATELET 72 (L) 435 - 450 K/uL MPV 10.7 9.4 - 12.3 FL ABSOLUTE NRBC 0.00 0.0 - 0.2 K/uL DF PENDING   
POC G3 Collection Time: 04/12/21  7:46 PM  
Result Value Ref Range Device: NASAL CANNULA    
 FIO2 (POC) 50 % pH (POC) 7.43 7.35 - 7.45    
 pCO2 (POC) 34.6 (L) 35 - 45 MMHG  
 pO2 (POC) 88 75 - 100 MMHG  
 HCO3 (POC) 23.0 22 - 26 MMOL/L  
 sO2 (POC) 97.1 95 - 98 % Base deficit (POC) 1.0 mmol/L Allens test (POC) Positive Site RIGHT BRACHIAL Specimen type (POC) ARTERIAL Performed by [employer] Murphy MD; 4/12/2021 @7:21 PM Voice dictation software was used during the making of this note. This software is not perfect and grammatical and other typographical errors may be present.   This note has not been proofread for errors. 
===================================================================

## 2021-04-13 PROBLEM — J43.9 EMPHYSEMA LUNG (HCC): Chronic | Status: ACTIVE | Noted: 2020-01-01

## 2021-04-13 PROBLEM — J96.01 ACUTE RESPIRATORY FAILURE WITH HYPOXIA (HCC): Status: ACTIVE | Noted: 2021-01-01

## 2021-04-13 PROBLEM — A41.9 SEPTIC SHOCK (HCC): Status: ACTIVE | Noted: 2021-01-01

## 2021-04-13 PROBLEM — R65.21 SEPTIC SHOCK (HCC): Status: ACTIVE | Noted: 2021-01-01

## 2021-04-13 PROBLEM — I10 ESSENTIAL HYPERTENSION: Chronic | Status: ACTIVE | Noted: 2020-01-01

## 2021-04-13 NOTE — ED NOTES
Pt was experiencing very labored breathing. sats dropped to 89 %. Took off nasal cannula and put on non rebreather at 15 l/m. sats improved to 97 and breathing much more relaxed

## 2021-04-13 NOTE — PROGRESS NOTES
Patient was admitted to our service with pneumonia and septic shock. He developed progressive hypoxia requiring initiation of Bipap. He became persistently hypotensive and was transferred to the ICU with Pulmonary consult. He is maxed out on 2 vasopressors and a third has just been added. He remains on Bipap. Case d/w Dr. Jazz Reyes and will transfer care to the ICU service at this time and I will sign off. Please re-consult as needed. Thanks.  
 
Sue Griffin MD

## 2021-04-13 NOTE — H&P
[facility] Initial History and Physical Note Patient: Flor Goncalves Date: 4/13/2021 
male, 60 y.o. Admit Date: 4/13/2021 Attending: Naima Delong DO  
 
ASSESSMENT AND PLAN:  
 
Active Problems: 
  CAP (community acquired pneumonia) (2/26/2021) IV Vanc/Cefepime. Blood/sputum cultures pending. Rapid COVID test is negative, pt received first COVID vaccine last week. Low suspicion of COVID at this time, confirmatory test pending. Septic Shock IVF, IV Levophed, titrate to MAP > 65. Acute respiratory failure with hypoxia ([address] (4/13/2021) On BiPAP, follow ABG. May need intubation if not improving. Emphysema lung ([address] (4/23/2020) Duonebs q4h. IV Solumedrol. IV antibiotics per above. Essential hypertension (4/17/2020) Stable. Continue home meds. Malignant neoplasm of overlapping sites of left lung ([facility] [address] (6/12/2020) Contributing to above. Brain metastases ([address] (6/23/2020) Stable. DVT Prophylaxis: Lovenox Code Status: FULL CODE Disposition: Admit to ICU for evaluation and treatment as per above. Anticipated discharge: 5-7 days This visit took in excess of 45 minutes of Critical Care time in evaluation and management of a critically ill or injured patient, such that the critical illness or injury acutely impairs one or more organ system` such that there is a high probability of imminent or life-threatening deterioration in the patient's condition needing immediate attention or presence of critical care physician near bedside for the above time. The time listed is exclusive of any procedures which may have been performed.   Critical care time includes time spent at bedside performing patient interview and physical exam, time spent researching patient prior to interaction with patient, time spent discussing findings and treatment plan with patient and/or family, time spent discussing patient with consultants and colleagues, time spent reviewing pertinent laboratory and radiographic evaluations, time spent re-evaluating patient, and/or time spent discussing patient with nursing staff. CHIEF COMPLAINT:  I was asked to consult on this patient at the kind request of Justin Sandoval DO, for medical management. HISTORY OF PRESENT ILLNESS:   
 
Patient Active Problem List  
Diagnosis Code  Essential hypertension I10  
 Mass of right lung R91.8  Emphysema lung ([facility] [address] J43.9  History of prior cigarette smoking Z87.891  Nodule of anterior chest wall R22.2  Malignant neoplasm of overlapping sites of left lung (HCC) C34.82  Brain metastases (HCC) C79.31  
 B12 deficiency E53.8  CAP (community acquired pneumonia) J18.9  Acute respiratory failure with hypoxia [facility]) J96.01  
 
 
Augusta Kwong is a 60 y.o. male, who  has a past medical history of Brain metastases ([facility] [address] (06/23/2020), Cancer [facility]), Hypertension, and Psychiatric disorder. He also has no past medical history of Adverse effect of anesthesia, Difficult intubation, Malignant hyperthermia due to anesthesia, Nausea & vomiting, or Pseudocholinesterase deficiency. ,  has a past surgical history that includes ir insert tunl cvc w port over 5 years (5/5/2020); hx orthopaedic; ir bx pancreas needle perc; and hx vascular access. who presents as a direct admission from [facility] ER to our 5th floor med/surg menendez with complaint of worsening SOB. Per Dr. Riley Rodriguez in [facility] ER, pt has had worsening SOB, cough, fevers, and chills for several days. The patient was diagnosed with CAP at the [facility] ER and COVID-19 test was obtained, rapid test negative. He was transferred here to our COVID unit for rule-out. Upon arriving, he was found to be hypoxic with O2 sats in the 70s on AirVo and hypotensive with MAPs in the 50s-60s. I called a Rapid Response and initiated transfer to ICU. Pt reports that he feels like he can't breathe, even after being started on BiPAP.  Reports feeling chills. Allergy No Known Allergies Medication list 
Prior to Admission Medications Prescriptions Last Dose Informant Patient Reported? Taking? albuterol (PROVENTIL HFA, VENTOLIN HFA, PROAIR HFA) 90 mcg/actuation inhaler   No No  
Sig: Take 2 Puffs by inhalation every four (4) hours as needed for Wheezing. azithromycin (ZITHROMAX) 250 mg tablet   No No  
Sig: Take two tablets today then one tablet daily  
dexAMETHasone (Decadron) 4 mg tablet   No No  
Sig: Take 4 mg by mouth two (2) times daily (with meals). doxycycline (MONODOX) 100 mg capsule   No No  
Sig: Take 1 Cap by mouth two (2) times a day. folic acid (FOLVITE) 1 mg tablet   No No  
Sig: TAKE 1 TABLET BY MOUTH EVERY DAY  
lidocaine-prilocaine (EMLA) topical cream   No No  
Sig: Apply  to affected area as needed for Pain. Apply 45 min to port site prior to needle stick  
lisinopriL (PRINIVIL, ZESTRIL) 20 mg tablet   No No  
Sig: Take 1 Tab by mouth daily. Indications: high blood pressure  
pantoprazole (PROTONIX) 40 mg tablet   No No  
Sig: Take 1 Tab by mouth daily. predniSONE (DELTASONE) 20 mg tablet   No No  
Sig: Take 60 mg by mouth daily. sertraline (ZOLOFT) 100 mg tablet   No No  
Sig: Take 1.5 Tabs by mouth daily. Indications: anxiousness associated with depression  
zolpidem (Ambien) 5 mg tablet   No No  
Sig: Take 1 Tab by mouth nightly as needed for Sleep (insomnia). Max Daily Amount: 5 mg. Facility-Administered Medications: None Past Medical History Past Medical History:  
Diagnosis Date  Brain metastases ([facility] 06/23/2020  Cancer ([facility] [address] lung  Hypertension   
 managed with meds  Psychiatric disorder   
 depression Past Surgical History:  
Procedure Laterality Date  HX ORTHOPAEDIC    
 right knee  HX VASCULAR ACCESS    
 IR BX PANCREAS NEEDLE PERC    
 IR INSERT TUNL CVC W PORT OVER 5 YEARS  5/5/2020 Social History Social History Socioeconomic History  Marital status:  Spouse name: Not on file  Number of children: Not on file  Years of education: Not on file  Highest education level: Not on file Tobacco Use  Smoking status: Former Smoker Packs/day: 1.50 Years: 25.00 Pack years: 37.50 Quit date:  Years since quittin.2  Smokeless tobacco: Former User Quit date: 2005 Substance and Sexual Activity  Alcohol use: Yes Comment: socially  Drug use: Not Currently Other Topics Concern Family History: Reviewed, negative REVIEW OF SYSTEMS:  
A 14 point review of systems was taken and pertinent positive as per HPI. PHYSICAL EXAMINATION: 
Vital 24 Hour Range Most Recent Value Temperature Temp  Min: 98.3 °F (36.8 °C)  Max: 98.8 °F (37.1 °C) Pulse Pulse  Min: 97  Max: 119 Respiratory Resp  Min: 8  Max: 56 Blood Pressure BP  Min: 77/39  Max: 163/77 Pulse Oximetry SpO2  Min: 84 %  Max: 99 % O2 No data recorded Vital Most Recent Value First Value Weight Height BMI   N/A Physical Exam:  
General:     Obvious respiratory distress Eyes:   No palpebral pallor or scleral icterus. ENT:   External auricular and nasal exam within normal limits. Mucous membranes are moist. 
Cardiovascular: Tachycardic rhythm, with normal S1 and S2, no JVD. No cyanosis or edema of extremities. Radial and dorsalis pedis pulses present 2+ bilaterally. Capillary refill 3-4 s. Respiratory:    Tachypneic, using accessory muscles, groaning when breathing on NRB. Bilateral crackles on auscultation Gastrointestinal:  No active vomiting or retching. Abdomen soft, non-tender, non-distended with normoactive bowel sounds. Skin:      Normal color, texture, and turgor. No rashes, lesions, or jaundice. Neurologic:  CN II-XII grossly intact and symmetrical.  
Moving all four extremities well with no focal deficits. Psychiatric:  Pleasant demeanor, anxious affect.  Alert and oriented x 3 
 
 Intake/Output last 3 shifts: 
 
 
Labs: 
magnesium:7,phos:7)CMP: No results found for: NA, K, CL, CO2, AGAP, GLU, BUN, CREA, GFRAA, GFRNA, CA, MG, PHOS, ALB, TBIL, TBILI, TP, ALB, GLOB, AGRAT, ALT 
 
 
CBC:  No results found for: WBC, HGB, HCT, PLT, HGBEXT, HCTEXT, PLTEXT No results found for: INR, PTMR, PTP, PT1, PT2, INREXT 
 
ABG:  No results found for: PH, PHI, PCO2, PCO2I, PO2, PO2I, HCO3, HCO3I, FIO2, FIO2I No results found for: CPK, RCK1, RCK2, RCK3, RCK4, CKMB, CKNDX, CKND1, TROPT, TROIQ, BNPP, BNP Imagining & Other Studies XR Results (maximum last 3): Results from [facility] [place] Encounter encounter on 04/12/21 XR CHEST PORT Narrative EXAM: Chest x-ray. INDICATION: Dyspnea. COMPARISON: Yesterday's chest x-ray. TECHNIQUE: Frontal view chest x-ray. FINDINGS: A large area of consolidation in the left mid and lower lung fields is 
unchanged. Again noted is cardiomegaly, elevation of the right diaphragm with 
right basilar atelectasis or scarring and a right chest wall infusion port 
catheter. No pneumothorax or pleural effusion is seen. Impression Unchanged left lung consolidation. XR CHEST PORT Narrative CHEST ONE VIEW HISTORY: Shortness of breath and generalized body aches along with dry cough 
today. Lung cancer. COMPARISON: 2/21/2021 FINDINGS: The right hemidiaphragm is elevated. A right-sided chest port is 
present with catheter tip at the cavoatrial junction. There is new consolidation in the left perihilar lung and left lung base. EKG 
leads are present. Impression New consolidation in the left lung. Results from [facility] [place] Encounter encounter on 02/21/21 XR CHEST PA LAT Narrative Frontal and lateral views of the chest 2/21/2021 Comparison: 02/15/2021 and prior Indication: Shortness of breath FINDINGS: Cardiac enlargement right IJ portacatheter stable. Mild atelectasis at 
the medial right lower lobe stable.  No new infiltrate or pneumothorax. No 
effusion. No discrete acute osseous lesion seen. Impression Stable chest x-ray as above. CT Results (maximum last 3): Results from [facility] Encounter encounter on 02/12/21 CT CHEST ABD PELV W CONT Narrative Exam: CT CHEST ABD PELV W CONT on 2/12/2021 1:49 PM 
 
Clinical History: The Male patient is 60years old  presenting for increased SOB 
for about three days. States hx of lung ca and has been getting chemo until a 
few months ago. States he talked with PCP yesterday about the increased SOB and 
told to come to the ER for evaluation if it was worse this morning. Pt does not 
use O2 at home. Pt has  Mask for triage. . 
 
TECHNIQUE:   
Thin slice axial pre and post contrast images were obtained through the chest, 
abdomen and pelvis without oral contrast.  Coronal reformatted images were also 
provided for review. A total of 100 ml of Iopamidol (ISOVUE-370) 76 % contrast was administered 
intravenously. All CT scans at this facility are performed using dose reduction/dose modulation 
techniques, as appropriate the performed exam, including the following: Automated Exposure Control; Adjustment of the mA and/or kV according to patient 
size (this includes techniques or standardized protocols for targeted exams 
where dose is matched to indication/reason for exam); and Use of Iterative Reconstruction Technique. Radiation Exposure Indices: 
Reference Air Kerma (Emery Yoli) = 2170 mGy-cm COMPARISON:  Chest abdomen pelvis CT 12/22/2020. FINDINGS:   
 
Chest: 
Lungs: Emphysematous changes are seen with a continued large right hilar lung 
mass measuring approximately 4.5 x 3.0 cm. There is mild postobstructive 
atelectasis at the right lung base with a stable small pulmonary nodule at the 
peripheral right lower lobe measuring 3 mm. A nodular consolidation is seen at 
the posterior left lung base measuring 19 mm. Vasculature:  Normal enhancement is demonstrated.  No evidence of aortic 
dissection or aneurysmal dilatation is seen. There is no evidence of pulmonary 
embolic disease. Cardiac: Normal size Mediastinum: Right hilar mass is confluent with adenopathy in this location. There are small nonspecific subcarinal and right paratracheal lymph nodes 
measuring up to 14 mm which must be presumed neoplastic until proven otherwise Chest wall: There is a stable subcutaneous left mid chest wall mass measuring 2.7 cm Osseous structures; No acute abnormality Abdomen: 
 
Liver: Normal size, contour, density and enhancement without focal mass. Biliary: Unremarkable gallbladder. No evidence of intra or extrahepatic biliary 
dilatation. Pancreas: Normal in size and contour without focal lesion. Spleen: Normal in size and contour without focal lesion. Adrenal glands: Right adrenal nodule measures 16 x 9 mm, with left adrenal 
nodule measuring 20 x 14 mm. Kidneys: Symmetric without evidence of hydronephrosis or nephrolithiasis. Normal 
enhancement throughout all visualized phases of contrast excretion. Bowel: No evidence of obstruction or focal lesion. Normal appendix Retroperitoneum/vasculature: No evidence of significant adenopathy. Unremarkable 
aorta and IVC. Abdominal soft tissues: Unremarkable. Osseous structures: No acute osseous abnormality. Pelvis: 
Unremarkable bladder. No significant adenopathy or free fluid. Mariposa García Impression 1. Stable right hilar mass confluent with regional adenopathy is associated 
mildly enlarged mediastinal lymph nodes which must be considered neoplastic. 2. Developing nodular area of atelectasis at posterior left lung base. 3. Stable left anterior chest wall mass. 4. Stable bilateral adrenal nodules 5. No evidence of pulmonary embolic disease. [CPT code(s)  [id number], [id number], [id number] Results from [facility] [place] Encounter encounter on 12/22/20 CT CHEST ABD PELV W CONT  Narrative CT CHEST, ABDOMEN AND PELVIS WITH INTRAVENOUS CONTRAST DATED 12/22/2020. History: Follow-up lung cancer. Comparison: CT chest 10/10/2020, and PET scan 9/29/2020 Technique:   Multiple contiguous helical CT images reconstructed at 5 mm were 
obtained from the base of the neck to the ischial tuberosities following oral 
and 100 cc Isovue-370 without acute complication. All CT scans performed at 
this facility use one or all of the following: Automated exposure control, 
adjustment of the mA and/or kVp according to patient's size, iterative 
reconstruction. Findings: 
CT Chest: The base of the neck is unremarkable in appearance. No evolving axillary, 
mediastinal, or hilar lymphadenopathy is seen. Nonspecific paratracheal and 
subcarinal mediastinal lymph nodes are seen which are unchanged from the prior 
study. The thoracic aorta is normal in caliber. Evaluation with lung windows demonstrates persistent changes at the right hilum. It is difficult to distinguish central mass from atelectatic changes although 
the degree of soft tissue fullness at the right hilum is not clearly changed 
best appreciated when comparing axial image 30 of the current examination to 
axial image 50 of the most recent CT scan of the chest. Complete collapse of the 
right middle lobe and atelectatic changes in the right lower lobe felt to be 
secondary to this right hilar abnormality persist and are not significantly 
changed/improved. Additional small right lung nodules are seen in the right 
upper and lower lobes measuring up to 3 mm in size. Small nodule seen on image 23 in the right upper lobe, and image 30 in the right lower lobe are unchanged 
seen on images 38 and 51 of the prior study. These are nonspecific and 
potentially benign. However, additional right upper lobe nodules are seen on 
axial image 22 which are not clearly demonstrated on a prior study.  Early 
pulmonary metastases cannot be excluded although these could represent benign findings such as inflammatory nodules. No pleural effusion is seen. Lungs are 
expanded without evidence for pneumothorax. No evolving aggressive osseous 
lesion is seen. The patient has a known left anterior chest wall metastasis. This is now seen on axial image 9 and appears slightly increased in size now 
measuring 2.7 cm x 2.8 cm previously measuring 2.7 cm x 2.4 cm. CT ABDOMEN:   
The Liver is homogeneous in attenuation. The spleen is homogeneous in 
attenuation. No contour deforming or enhancing mass lesions are seen of the 
pancreas. However, enlarging bilateral adrenal nodules are seen with the larger 
nodule involving the left adrenal gland on axial image 61 measuring 2 cm x 1.4 
cm. The gallbladder has an unremarkable CT appearance without radioopaque stones 
or pericholecystic fluid/inflammatory changes. The kidneys enhance 
symmetrically and no evidence of hydronephrosis is seen. The visualized loops of small bowel and colon are normal in caliber. No free 
fluid and no free air is seen in the abdomen. No evolving adenopathy is seen of 
the abdomen. The abdominal aorta demonstrates mild atherosclerotic 
calcification. No evolving aggressive osseous lesion is seen. CT PELVIS: 
No abnormal pelvic fluid collections are present. No pelvic adenopathy is seen. The urinary bladder is unremarkable. No evolving aggressive osseous lesion is 
seen. Impression IMPRESSION:   
1. Central right lung mass. It is difficult to distinguish mass from 
atelectatic lung although the mass is not clearly improved. Persistent complete 
collapse of the right middle lobe and atelectatic changes of the right lower 
lobe are seen which are also unimproved. 2.  Multiple findings concerning for progression of metastatic disease. Specifically, the patient's left anterior chest wall mass is slightly increased 
in size, and enlarging bilateral adrenal nodules are seen.  In addition, new tiny 
right upper lobe nodules are seen although these are less specific in their 
appearance and could represent inflammatory nodules. This report was made using voice transcription. Despite my best efforts to avoid 
any, transcription errors may persist. If there is any question about the 
accuracy of the report or need for clarification, then please call [phone], or text me through [email or web address] for clarification or correction. Results from Hospital Encounter encounter on 10/10/20 CT CHEST W CONT Narrative CT OF THE CHEST WITH INTRAVENOUS CONTRAST, 10/10/2020 Indication: Cough for 3 to 4 days, congestion, shortness of breath, and history 
of lung cancer. Comparison: PET scan 9/29/2020 Technique:   2.5 mm axial scans from above the aortic arch to the lung bases 
following the uneventful administration of 70 mL of Isovue-370. Intravenous 
contrast was given to evaluate for pulmonary embolism. All CT scans performed at 
this facility use one or all of the following: Automated exposure control, 
adjustment of the mA and/or kVp according to patient's size, iterative 
reconstruction. Findings: The base of the neck is unremarkable in appearance. No evolving adenopathy is 
seen. The thoracic aorta is normal in caliber. Opacification of the pulmonary 
arteries is good. No defined filling defects are seen to suggest pulmonary 
embolism. The central pulmonary arteries of the right lung do appear attenuated 
by tumor described on the prior CT scan of the chest. This includes complete 
obstruction of arteries and airways extending to the right middle lobe. Evaluation with lung windows demonstrates abnormal soft tissue density at the 
right hilum extending into the right inferior hilum consistent with the 
patient's known right lung tumor. Diffuse ground glass prominence of the lungs 
is seen with a similar appearance noted on the prior exam.  No pleural effusion 
is seen.   Lungs are expanded without evidence for pneumothorax. No acute 
osseous susceptibility is seen. A stable left anterior upper chest wall mass is 
seen. Limited evaluation of the upper abdomen demonstrates no acute abnormality. Stable bilateral adrenal nodules are seen. Impression IMPRESSION:   
1. No evidence for pulmonary embolism. 2. Multiple additional findings consistent with the patient's known metastatic 
lung cancer which are not significantly changed when compared to the prior PET 
scan. It should be noted that these do appear to obstruct airways and vascular 
structures extending to the right middle lobe. 3. Diffuse ground glass prominence of the lungs. This can suggest an 
inflammatory process although these are nonspecific for a distinct etiology. This report was made using voice transcription. Despite my best efforts to avoid 
any, transcription errors may persist. If there is any question about the 
accuracy of the report or need for clarification, then please call [phone], or text me through [email or web address] for clarification or correction. MRI Results (maximum last 3): Results from [facility] Encounter encounter on 09/15/20 MRI BRAIN W WO CONT Narrative Clinical History: The Male patient is 60years old  presenting with symptoms of 
brain metastases. Comparison:  Brain MRI 6/23/2020 Technique:   T2-weighted, T1-weighted, FLAIR, and diffusion-weighted transaxial 
, T1 sagittal, and gradient echo coronal pulse sequences were initially 
performed. Following  the administration of contrast, additional T1-weighted 
transaxial and coronal sequences were performed. 22 mL of gadoterate meglumine (DOTAREM) 0.5 mmol/mL (376.9 mg/mL) contrast was administered intravenously. Findings:  
 
Overall decreasing cerebral metastases are demonstrated consistent with 
treatment response.  The largest centrally necrotic right temporal lobe lesion 
now measures 2.1 x 1.4 cm (previously 4.1 x 3.1 cm) with decreasing surrounding 
edema, as well as resolved mass effect and midline shift. Other lesions measure 8 mm in the right frontal lobe, and 9 mm in the left occipital lobe, with all 
rate containing hemorrhagic blood products. No new lesions are identified. A 
stable right posterior parafalcine enhancing lesion remains consistent with a 
meningioma, with no significant surrounding edema. There is otherwise chronic microvascular disease with scattered lacunae 
throughout the corona radiata and centrum semiovale. There are no abnormal extra-axial fluid collections. No evidence of mass or mass 
effect is seen. There is no diffusion signal abnormality. Expected flow voids 
are maintained in the major intracranial vessels. The cerebellum and brainstem are unremarkable. There is no evidence of Chiari 
malformation. The ventricular system and CSF containing spaces are unremarkable in appearance. Visualized extracranial soft tissues are unremarkable. The paranasal sinuses are well pneumatized and aerated. Impression Impression: 1. Overall treatment response with decreasing size of cerebral metastases, and 
resolved subfalcine midline shift. 2. 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 CPT Code:  20463 Results from [facility] Encounter encounter on 06/23/20 MRI BRAIN W WO CONT Narrative MRI BRAIN WITHOUT and WITH CONTRAST. HISTORY:  Lung cancer staging. COMPARISON:  None. Study performed within 24 hours of admission. TECHNIQUE:  Sagittal T1, axial T1, T2, FLAIR, gradient echo, diffusion with ADC 
map were followed by 23cc intravenous gadolinium after which axial and coronal 
T1 images were repeated. Intravenous contrast was given to increase specificity 
of T2 abnormalities. FINDINGS:  
-Contrast: There are multiple enhancing masses consistent metastatic disease. The largest is in the right temporal lobe and measures 41 x 31 mm on an axial 
image.  There is moderate surrounding vasogenic edema. A 21 x 19 mm enhancing 
nodule in the left occipital lobe. A 15 x 15 mm nodule right frontal lobe. There 
is a parafalcine uniformly enhancing 20 x 7 mm nodule, possibly a meningioma. There is 5 mm midline shift. 
-Diffusion images: Unremarkable. -Gradient echo images: Some susceptibility artifact in the left occipital 
metastasis. -FLAIR sequence images: Unremarkable. 
-Lateral ventricles are: Mildly effaced.   
-Pituitary and parasellar structures: unremarkable on the sagittal T1 images.   
-There are normal T2 flow-voids in the major vessels.    
-Posterior fossa structures are unremarkable.   
-Basal ganglia: appear symmetric.   
-Orbits: are grossly normal.  
-Paranasal sinuses: are clear. Impression IMPRESSION: At least 3 brain metastases as above which measure up to 41 mm in 
the right temporal lobe with moderate surrounding vasogenic edema and 5 mm of 
leftward midline shift. Results communicated to Dr Fady Barlow this morning. Jose Baker 76 Results from Hospital Encounter encounter on 05/01/20 MRI ABD W WO CONT Narrative EXAM: MRI ABDOMEN WITHOUT AND WITH IV CONTRAST Comparison: PET/CT dated 4/28/2020. Indication: Possible pancreatic tail mass. Technique:  Multiplanar, multisequence MR imaging was performed of the abdomen 
prior to and following gadolinium contrast. 22 mL  Dotarem contrast material was 
administrated intravenously for the examination. This study was acquired 
following the IV administration of contrast material, given the patient's 
indications for the examination. If IV contrast material had not been 
administered, the likely of detecting abnormalities relevant to the patient's 
condition would have been substantially decreased. Three dimensional MRCP was 
performed using maximum intensity projection reconstruction. FINDINGS: 
Visualized Lungs: Normal. 
 
Liver: Normal in size and morphology. No focal lesions. Gallbladder/biliary: Normal gallbladder. No biliary dilatation. Pancreas: Previously seen lesion of the pancreatic tail is suboptimally 
evaluated because of motion artifact. Again demonstrated is a masslike lesion of 
the pancreatic tail measuring 2.5 x 1.9 cm with apparent mild enhancement. No 
pancreatic duct dilation or peripancreatic inflammatory changes. Spleen: Normal. 
 
Adrenals: Normal. 
 
Kidneys: No focal lesion or hydronephrosis. Gastrointestinal: Normal. 
 
Peritoneum/retroperitoneum: Normal. 
 
Lymph nodes: Normal. 
 
Bones/Soft tissues: Normal. 
 
  
 Impression IMPRESSION: 
2.5 x 1.9 cm lesion of the pancreatic tail with apparent mild enhancement, which 
is indeterminate in the setting of a primary lung mass reflecting either 
metastasis or a primary pancreatic mass. Intrapancreatic splenule is unlikely 
given signal differences between the lesion and spleen. Endoscopic ultrasound 
may be useful in further evaluating. Nuclear Medicine Results (maximum last 3): No results found for this or any previous visit. US Results (maximum last 3): No results found for this or any previous visit. DEXA Results (maximum last 3): No results found for this or any previous visit. RANDY Results (maximum last 3): No results found for this or any previous visit. IR Results (maximum last 3): Results from [facility] Encounter encounter on 05/05/20 IR INSERT TUNL CVC W PORT OVER 5 YEARS Narrative Title: Chest port placement through the right internal jugular vein using 
ultrasound and fluoroscopic guidance with maximal sterile barrier appropriately 
documented and fluoroscopy time and images documented in report. History: 24-year-old male with lung cancer. :  Remberto Sim PA-C Supervising Physician: Dread Abdullahi M.D.  
 
Consent: Informed written and oral consent was obtained from the patient after 
explanation of benefits and risks (including, but not limited to: infection, 
vascular injury, lung injury, and thrombus formation) of procedure. The 
patient's questions were answered to their satisfaction. They stated 
understanding and requested that we proceed. Procedure: This port was inserted with all elements of maximal sterile barrier 
technique, cap and mask and sterile gown and sterile gloves and sterile full 
body drape and hygiene and 2% chlorhexidine for cutaneous antisepsis and sterile 
ultrasound gel and sterile ultrasound probe cover. Following routine prep and drape of the right neck and chest, a local field 
block with lidocaine was achieved. Ultrasound evaluation of all potential 
access sites was performed due to lack of a palpable vein. The vein was not 
palpable due to overlying adipose tissue. Using real-time ultrasound guidance, 
with appropriate image recording, the patent right internal jugular vein was 
accessed. Using fluoroscopy, a peel-away sheath was placed over a wire. A 1-inch incision was made on the right chest wall and a subcutaneous pocket 
created. The catheter was tunneled subcutaneously and passed down the peel-away 
sheath positioning the tip in the right atrium, confirmed fluoroscopically. The 
catheter was cut to the appropriate length and connected to the [facility] which was 
then placed in the pocket. The Port-A-Cath was accessed, aspirated easily, and 
was filled with heparinized saline. All incisions were closed with absorbable suture. Sterile surgical glue was 
placed on the skin. Complications: None. Radiation dose:  
Fluoroscopy time: 6 seconds. Reference air kerma (mGy): 11 Kerma area product (cGy.cm2):  287 Fluoroscopic images: 1 Contrast:  0 milliliters. Medications:  MAC. Ancef was given for prophylactic antibiotic therapy. Impression Impression: Uncomplicated right internal jugular vein tunneled power injectable 
chest port placement.   
 
Plan: The patient will recover for 1 hour. The chest port is ready for use. VAS/US Results (maximum last 3): No results found for this or any previous visit. PET Results (maximum last 3): [place] [facility] [place] on 09/29/20 PET/CT TUMOR IMAGE SKULL THIGH (SUB) Narrative PET/CT Indication: Lung cancer with brain metastasis, restaging. Radiopharmaceutical: 18.12 mCi F18-FDG, intravenously. Technique: Imaging was performed from the skull through the proximal thighs 
using routine PET/CT acquisition protocol. Imaging was performed approximately 60 minutes post injection. Oral contrast was administered. Radiation dose 
reduction techniques were used for this study:  Our CT scanners use one or all 
of the following: Automated exposure control, adjustment of the mA and/or kVp 
according to patient's size, iterative reconstruction. Serum glucose: 104 mg/dL prior to injection. Comparison studies: Brain MRI 6/23/2020, 9/15/2021, PET/CT 4/28/2020 Findings: 
 
Head and Neck: No enlarged or hypermetabolic cervical lymph nodes. Chest: Centrally obstructing mass at the right hilum appears similar by CT, 
measuring approximately 3.3 cm but is less hypermetabolic, currently with 
maximum SUV 7.4, previously 11.3. Multistation mediastinal adenopathy has 
improved, including a less conspicuous and less hypermetabolic subcarinal node 
as well as a right paratracheal node which is smaller and less hypermetabolic, 
currently 0.9 cm short axis with maximum SUV 2.7, previously 1.2 cm short axis, 
maximum SUV 4.1. Abdomen/Pelvis: Left adrenal nodule has enlarged and is more hypermetabolic, 
measuring 1.5 cm with maximum SUV of 3.8, previously 1.1 cm with maximum SUV of 
3.0. Mildly hypermetabolic, enlarging right adrenal nodule measuring 1.1 cm, 
previously 0.7 cm. There is indeterminate focal uptake in the tail the pancreas, maximum SUV 3.2.  
 
Bones and soft tissues: Left upper anterior chest wall hypermetabolic soft 
tissue nodule is enlarging, currently 2.6 x 2.3 cm with maximum SUV of 6.9, 
previously 1.8 x 1.8 cm with maximum SUV of 7.5. Impression IMPRESSION: 
1. Interval mixed response to therapy with essentially stable centrally 
obstructing mass at the right lung hilum. 2.  Interval improvement in mediastinal adenopathy. 3.  Enlarging left upper chest hypermetabolic soft tissue nodule, presumably 
metastatic. 4.  Enlarging, mildly hypermetabolic bilateral adrenal nodules, also presumably 
metastatic. Results from [facility] Encounter encounter on 04/28/20 PET/CT TUMOR IMAGE SKULL THIGH W (INI) Narrative PET/CT: 4/28/2020 INDICATION: Initial diagnosis of lung mass. TECHNIQUE: After oral administration of gastroview and intravenous 
administration of 17.32 mCi of F18 FDG, noncontrast CT images were obtained for 
attenuation correction and for fusion with emission PET images. A series of 
overlapping emission PET images were then obtained beginning 60 minutes after 
injection of FDG. The area imaged spanned the region from the skull base to the 
mid thighs. All CT scans performed at this facility use one or all of the 
following: Automated exposure control, adjustment of the mA and/or kVp according 
to patient's size, iterative reconstruction. COMPARISON: CT chest with contrast 4/16/2020 FINDINGS:  
NECK/CHEST: 
Only physiologic activity is seen in the neck. No adenopathy is suggested on 
limited CT images. Previously, abnormal changes were seen at the central right inferior hilum. Abnormal hypermetabolic activity is seen at this level demonstrating a maximum SUV of 11.2 g/mL furthermore raising concern for potential neoplasm. This 
corresponds to abnormal soft tissue density which overall measures 5.7 cm x 3.8 
cm in greatest transverse dimension although this could include benign vascular 
structures or atelectatic lung on this noncontrast study.  Additional increased activity is seen associated with multiple paratracheal and subcarinal 
mediastinal lymph nodes concerning for heather metastases. The largest lymph node 
is a low paratracheal lymph node seen on image 79 measuring 1.1 cm short axis. In addition to mediastinal adenopathy, there is a prominent right hilar lymph 
node seen on axial image 85 measuring 1.6 cm short axis which demonstrates mild 
activity concerning for an additional metastatic lymph node. Lastly, increased 
activity is seen associated with the superficial soft tissue mass in the left 
anterior upper chest seen on axial image 64 measuring 1.9 cm in size and 
demonstrating a maximum SUV of 10 g/mL. The appearance is felt to be most 
concerning for a soft tissue metastasis. No aggressive osseous lesion is seen. ABDOMEN/PELVIS: 
Previously, abnormal nodularity was seen of the left adrenal gland. This is now 
seen on axial image 147 with thickening of the left adrenal body measuring up to 
10 mm in width. Only mild PET activity is seen associated with this finding 
which is not clearly above background activity of the liver. Currently, this is 
favored to be benign and could represent a benign lesion such as an adenoma. Mild focal activity is seen in the most proximal pancreatic tail on axial PET/CT 
image 145. In retrospect, some questionable mild attenuation changes were seen 
at this level on the prior contrasted CT scan of the chest visualized on axial 
image 117. A pancreatic tail lesion is difficult to exclude given these 
findings. This should be further assessed. No focal hypermetabolic hepatic 
lesion is seen. No adenopathy is seen. Moderate atherosclerotic calcification is 
seen of the abdominal aorta. No aggressive osseous lesion is seen. Only physiologic activity seen in the pelvis. No adenopathy is seen. No 
aggressive osseous lesion is seen. Impression IMPRESSION:  
1.  Abnormal PET activity associated with abnormal CT changes at the right 
inferior hilum raising concern for an obstructing central neoplasm of the right 
lung. Further evaluation as clinically indicated is recommended. 2. Increased activity within mildly prominent mediastinal and right hilar lymph 
nodes. In addition, increased activity is seen associated with a soft tissue 
mass in the left anterior upper chest wall measuring 1.9 cm in size concerning 
for metastatic lesions. No evidence for metastatic disease is otherwise seen. Thickening is seen in the body of the left adrenal gland without clear worrisome 
activity favored to represent benign changes as described above. 3. Focal activity in the most proximal pancreatic tail which demonstrates 
questionable attenuation changes on the prior contrasted CT scan of the chest. A 
proximal pancreatic lesion cannot be excluded. This would be best further 
assessed with a pancreatic mass protocol MRI or CT scan. If the patient cannot 
adequately hold his breath given the currently visualized lung changes, then CT 
would be favored given that this would be less prone to motion degradation. EKG Results None Sean Hendrix MD 
4/13/2021 1:41 AM

## 2021-04-13 NOTE — PROGRESS NOTES
Re-evaluation of patient this morning on rounds. He is now maxed on 2 pressors, daphne ordered and epi ordered as well as he also looked to be struggling with respirations on BIPAP w RR of 36-38. AGMAcidosis and RAN have developed and will add bicarb+gtt. Discussed with him possible need to proceed with intubation, but he told me he did not want to be intubated. He seemed to not want to undergo very aggressive care and clearly was okay with dying if it \"was his time. \" He asked me to call his wife Caitlyn Henderson and relay that, which I did, but also asked them to talk and the nurse was able to set up a phone call with him. They were able to communicate and he convinced her that he was okay with DNR status and \"was ready to be with their son Sheeba Chung" if it was his time. She accepted his decision and should be here tonight around 6 flying from [place] to see him. We will continue aggressive support as currently doing, but have placed DNR orders per his wishes. 32 minutes additional critical care time spent this morning.  
 
Christian Collazo MD

## 2021-04-13 NOTE — ACP (ADVANCE CARE PLANNING)
Advance Care Planning Advance Care Planning Activator (Inpatient) Conversation Note Date of ACP Conversation: 04/13/21 Conversation Conducted with:   Patient with Decision Making Capacity and Healthcare Decision Maker: Next of Kin by law (only applies in absence of a Healthcare Power of  or Legal Guardian) by Dr. Leticia Alicia ACP Activator: Lorrie Meléndez RN Health Care Decision Maker: 
 
Current Designated Health Care Decision Maker:  
  Primary Decision Maker: Ginger Austin - [phone] Click here to complete [phone] including selection of the Healthcare Decision Maker Relationship (ie \"Primary\") Today we documented Decision Maker(s) consistent with Legal Next of Kin hierarchy. Care Preferences Ventilation: \"If you were in your present state of health and suddenly became very ill and were unable to breathe on your own, what would your preference be about the use of a ventilator (breathing machine) if it were available to you? \" If patient would desire the use of a ventilator (breathing machine), answer \"yes\", if not \"no\":no \"If your health worsens and it becomes clear that your chance of recovery is unlikely, what would your preference be about the use of a ventilator (breathing machine) if it were available to you? \" Would the patient desire the use of a ventilator (breathing machine)? NO Resuscitation \"CPR works best to restart the heart when there is a sudden event, like a heart attack, in someone who is otherwise healthy. Unfortunately, CPR does not typically restart the heart for people who have serious health conditions or who are very sick. \" \"In the event your heart stopped as a result of an underlying serious health condition, would you want attempts to be made to restart your heart (answer \"yes\" for attempt to resuscitate) or would you prefer a natural death (answer \"no\" for do not attempt to resuscitate)? \" no 
 
 
[] Yes [] No   Educated Patient / Apolinar Nyhan regarding differences between Advance Directives and portable DNR orders. Length of ACP Conversation in minutes: 20min Conversation Outcomes: 
[x] ACP discussion completed 
[] Existing advance directive reviewed with patient; no changes to patient's previously recorded wishes 
 
 [] New Advance Directive completed 
 [] Portable Do Not Resuscitate prepared for Provider review and signature 
[] POLST/POST/MOLST/MOST prepared for Provider review and signature Follow-up plan:   
[] Schedule follow-up conversation to continue planning 
[] Referred individual to Provider for additional questions/concerns  
[] Advised patient/agent/surrogate to review completed ACP document and update if needed with changes in condition, patient preferences or care setting  
 
[] This note routed to one or more involved healthcare providers

## 2021-04-13 NOTE — ED NOTES
Took bedside report from [facility], [facility]. Hanging a liter of NS due to BP msg. Dr. Arielle Betancourt

## 2021-04-13 NOTE — PROGRESS NOTES
Dustin Harris RN from [place] ED, who relieved Ortega Ventura who gave report on pt earlier,  called to let us know the reason pt's has not made it to our floor is because he started to cough extensive amount of sputum after receiving bolus fluids for low BP. Pt O2 sat dropped and had to be on non-rebreather; however, once suctioned pt was fine and sat went up.

## 2021-04-13 NOTE — PROGRESS NOTES
TRANSFER - OUT REPORT: 
 
Verbal report given to Simi RN (name) on [facility]  being transferred to CCU(unit) for change in patient condition(BIPAP) Report consisted of patients Situation, Background, Assessment and  
Recommendations(SBAR). Information from the following report(s) was reviewed with the receiving nurse. Lines:  
Venous Access Device [employer] [facility] Port 05/05/20 Upper chest (subclavicular area, right (Active) Peripheral IV 04/12/21 Left Hand (Active) Opportunity for questions and clarification was provided. Patient transported with: 
 O2 @ BIPAP liters

## 2021-04-13 NOTE — ED NOTES
Birdie Perez RN at  for pt transfer to room 519. Told her all updates on pt per Dr. William Sosa. [facility] [place] agreed to accept pt and verbalized understanding of all updates. Jennifer packed up pt and took him for transport.

## 2021-04-13 NOTE — PROGRESS NOTES
Patient's WOB and current vitals discussed with Dr. Ita Smith. Patient maxed on levo and vaso. Orders received to start daphne per Dr. Ita Smith.

## 2021-04-13 NOTE — PROGRESS NOTES
0201: Rapid response called  
0202: BGL 84 
0203: B/P 69/60 
0204:Transfer pt to 3307 ICU 
0207:B/P 64/51 
0210: Pt transferred by ICU staff

## 2021-04-13 NOTE — PROGRESS NOTES
3151-  Pt arrived to floor via EMS transport. Pt c/o not able to breathe. On non-rebreather at this time. RT in room. Will continue to monitor. 0102-  Pt is on Airvo 50L/45%. sats unstable 85%-98 
0106- O2 sat continues to drop and stayed on the high 70's. RT called back to room 0128-Admitting Dr notified of pt's arrival and condition. 36-  Dr Anaid Scott arrived to pt's room. Order for ABG and 500ml NS bolus over 2 hrs received. 0147-Order to transfer pt's to ICU received.

## 2021-04-13 NOTE — CONSULTS
CONSULT NOTE Nano Ramirez 4/13/2021 Date of Admission:  4/13/2021 The patient's chart is reviewed and the patient is discussed with the staff. Subjective:  
 
Patient is a 60 y.o.  male seen and evaluated at the request of Dr. Sylvan Hurdle Dr. Sherryle Anda Patient is a very unfortunate 47-year-old [address] male past medical history of stage IV lung cancer with brain mets who presents shortness of breath he showed up at the [address] ED shortness of breath progressive hypotension came to the floor had a rapid response came down to the ICU where his hypertensive A-line was present patient still hypotensive add another pressor IV fluids he seemed to be having irritation with features most likely from dependent on steroids may be related to his chemotherapy regimen he had no hemoptysis but had his Covid vaccine the first 1 he had last week and now he is here with shortness of breath x-ray is consistent with a what seems to be left lower lobe and left lingula infiltrate. Patient is on BiPAP. Review of Systems Review of systems not obtained due to patient factors. Patient Active Problem List  
Diagnosis Code  Essential hypertension I10  
 Mass of right lung R91.8  Emphysema lung ([address] J43.9  History of prior cigarette smoking Z87.891  Nodule of anterior chest wall R22.2  Malignant neoplasm of overlapping sites of left lung (HCC) C34.82  Brain metastases (HCC) C79.31  
 B12 deficiency E53.8  CAP (community acquired pneumonia) J18.9  Acute respiratory failure with hypoxia (HCC) J96.01  
 Septic shock (HCC) A41.9, R65.21 [employer] . Prior to Admission Medications Prescriptions Last Dose Informant Patient Reported? Taking? albuterol (PROVENTIL HFA, VENTOLIN HFA, PROAIR HFA) 90 mcg/actuation inhaler   No No  
Sig: Take 2 Puffs by inhalation every four (4) hours as needed for Wheezing.   
azithromycin (ZITHROMAX) 250 mg tablet   No No Sig: Take two tablets today then one tablet daily  
dexAMETHasone (Decadron) 4 mg tablet   No No  
Sig: Take 4 mg by mouth two (2) times daily (with meals). doxycycline (MONODOX) 100 mg capsule   No No  
Sig: Take 1 Cap by mouth two (2) times a day. folic acid (FOLVITE) 1 mg tablet   No No  
Sig: TAKE 1 TABLET BY MOUTH EVERY DAY  
lidocaine-prilocaine (EMLA) topical cream   No No  
Sig: Apply  to affected area as needed for Pain. Apply 45 min to port site prior to needle stick  
lisinopriL (PRINIVIL, ZESTRIL) 20 mg tablet   No No  
Sig: Take 1 Tab by mouth daily. Indications: high blood pressure  
pantoprazole (PROTONIX) 40 mg tablet   No No  
Sig: Take 1 Tab by mouth daily. predniSONE (DELTASONE) 20 mg tablet   No No  
Sig: Take 60 mg by mouth daily. sertraline (ZOLOFT) 100 mg tablet   No No  
Sig: Take 1.5 Tabs by mouth daily. Indications: anxiousness associated with depression  
zolpidem (Ambien) 5 mg tablet   No No  
Sig: Take 1 Tab by mouth nightly as needed for Sleep (insomnia). Max Daily Amount: 5 mg. Facility-Administered Medications: None Past Medical History:  
Diagnosis Date  Brain metastases ([facility] 06/23/2020  Cancer ([facility] [address] [facility] lung  Hypertension   
 managed with meds  Psychiatric disorder   
 depression Past Surgical History:  
Procedure Laterality Date  HX ORTHOPAEDIC    
 right knee  HX VASCULAR ACCESS    
 IR BX PANCREAS NEEDLE PERC    
 IR INSERT TUNL CVC W PORT OVER 5 YEARS  5/5/2020 Social History Socioeconomic History  Marital status:  Spouse name: Not on file  Number of children: Not on file  Years of education: Not on file  Highest education level: Not on file Occupational History  Not on file Social Needs  Financial resource strain: Not on file  Food insecurity Worry: Not on file Inability: Not on file  Transportation needs Medical: Not on file Non-medical: Not on file Tobacco Use  Smoking status: Former Smoker Packs/day: 1.50 Years: 25.00 Pack years: 37.50 Quit date:  Years since quittin.2  Smokeless tobacco: Former User Quit date: 2005 Substance and Sexual Activity  Alcohol use: Yes Comment: socially  Drug use: Not Currently  Sexual activity: Not on file Lifestyle  Physical activity Days per week: Not on file Minutes per session: Not on file  Stress: Not on file Relationships  Social connections Talks on phone: Not on file Gets together: Not on file Attends [employer] service: Not on file Active member of club or organization: Not on file Attends meetings of clubs or organizations: Not on file Relationship status: Not on file  Intimate partner violence Fear of current or ex partner: Not on file Emotionally abused: Not on file Physically abused: Not on file Forced sexual activity: Not on file Other Topics Concern [address] Service Not Asked  Blood Transfusions Not Asked  Caffeine Concern Not Asked  Occupational Exposure Not Asked [place] Hazards Not Asked  Sleep Concern Not Asked  Stress Concern Not Asked  Weight Concern Not Asked  Special Diet Not Asked  Back Care Not Asked  Exercise Not Asked  Bike Helmet Not Asked  Seat Belt Not Asked  Self-Exams Not Asked Social History Narrative  Not on file No family history on file. No Known Allergies Current Facility-Administered Medications Medication Dose Route Frequency  NOREPINephrine (LEVOPHED) 4 mg in 5% dextrose 250 mL infusion  0.5-30 mcg/min IntraVENous TITRATE  cefepime (MAXIPIME) 2 g in 0.9% sodium chloride (MBP/ADV) 100 mL MBP  2 g IntraVENous Q12H  
 albuterol-ipratropium (DUO-NEB) 2.5 MG-0.5 MG/3 ML  3 mL Nebulization Q4H RT  
 vancomycin (VANCOCIN) 1500 mg in  ml infusion  1,500 mg IntraVENous Q18H  
 morphine injection 1 mg  1 mg IntraVENous Q2H PRN  
 lactated Ringers infusion  250 mL/hr IntraVENous CONTINUOUS  
 vasopressin (VASOSTRICT) 20 Units in 0.9% sodium chloride 100 mL infusion  0-0.03 Units/min IntraVENous TITRATE  hydrocortisone Sod Succ (PF) (SOLU-CORTEF) injection 50 mg  50 mg IntraVENous Q6H Objective:  
 
Vitals:  
 04/13/21 7883 04/13/21 0244 04/13/21 2397 04/13/21 3172 BP:      
Pulse:      
Resp:      
Temp:      
SpO2: 94%  96% 94% Weight:  118.2 kg (260 lb 9.3 oz) Height:  5' 11\" (1.803 m) PHYSICAL EXAM  
 
Constitutional: Moderate distress from shortness of breath EENMT:  Sclera clear, pupils equal, oral mucosa moist 
Respiratory: Good bilateral air entry no wheezing Cardiovascular:  RRR without M,G,R 
Gastrointestinal: soft and non-tender; with positive bowel sounds. Musculoskeletal: warm without cyanosis. There is no lower extremity edema. Skin:  no jaundice or rashes, no wounds Neurologic: no gross neuro deficits Psychiatric:  alert and oriented x 3 CXR:   
 
 
Recent Labs 04/12/21 1944 WBC 1.4* HGB 9.5* HCT 28.7*  
PLT 72* Recent Labs 04/12/21 1942 *  
K 4.1 CL 95*  CO2 23 BUN 26* CREA 1.62* CA 9.1 ALB 3.7 TBILI 1.4* ALT 69* Recent Labs 04/13/21 
6050 04/13/21 
0150 04/13/21 
0018 PHI 7.34* 7.32* 7.30* PCO2I 34.8* 39.3 38.8 PO2I 117* 56* 92 HCO3I 18.6* 20.2* 19.1* Recent Labs 04/12/21 1944 LAC 2.0 Assessment:  (Medical Decision Making) Hospital Problems  Date Reviewed: 4/2/2021 Codes Class Noted POA Acute respiratory failure with hypoxia ([facility] ICD-10-CM: J96.01 
ICD-9-CM: 518.81  4/13/2021 Yes Septic shock (HCC) ICD-10-CM: A41.9, R65.21 ICD-9-CM: 038.9, 785.52, 995.92  4/13/2021 Yes  
   
 CAP (community acquired pneumonia) ICD-10-CM: J18.9 ICD-9-CM: 241  2/26/2021 Unknown Brain metastases [facility]) ICD-10-CM: C79.31 
ICD-9-CM: 198.3  6/23/2020 Yes  Malignant neoplasm of overlapping sites of left lung [facility]) ICD-10-CM: C34.82 
ICD-9-CM: 162.8  6/12/2020 Yes Emphysema lung ([address] ICD-10-CM: J43.9 ICD-9-CM: 492.8  4/23/2020 Yes Essential hypertension ICD-10-CM: I10 
ICD-9-CM: 401.9  4/17/2020 Yes Plan:  (Medical Decision Making) --Severe septic shock most likely from either postobstructive pneumonia or community-acquired pneumonia in the setting of lung cancer this point add vasopressin IV fluids hydrocortisone --Very unlikely Covid but testing is positive isolation is in place --History of lung cancer with mets stage IV brain mets will continue to monitor high risk for complications --Thrombocytopenia most likely due to infection we will put the patient on Lovenox for DVT prophylaxis monitor platelet count 
 
--Critical care time spent the care of this patient is 45 minutes. More than 50% of the time documented was spent in face-to-face contact with the patient and in the care of the patient on the floor/unit where the patient is located. Thank you very much for this referral.  We appreciate the opportunity to participate in this patient's care. Will follow along with above stated plan.  
 
Catie Will MD

## 2021-04-13 NOTE — ED NOTES
TRANSFER - OUT REPORT: 
 
Verbal report given to [address] on [place]  being transferred to [facility] room 518 for routine progression of care Report consisted of patients Situation, Background, Assessment and  
Recommendations(SBAR). Information from the following report(s) SBAR was reviewed with the receiving nurse. Lines:  
Venous Access Device Bard Power Port 05/05/20 Upper chest (subclavicular area, right (Active) Peripheral IV 04/12/21 Left Hand (Active) Opportunity for questions and clarification was provided. Patient transported with: 
 Monitor O2 @ 6 liters

## 2021-04-13 NOTE — PROGRESS NOTES
Pt is transferred to ICU one hour post arrival to floor. Database admission not completed at this time.

## 2021-04-13 NOTE — PROGRESS NOTES
Pharmacokinetic Consult to Pharmacist 
 
Torifátima Lyon is a 60 y.o. male being treated for CAP with vanc/cefepime. Height: 5' 11\" (180.3 cm)  Weight: 118.2 kg (260 lb 9.3 oz) Lab Results Component Value Date/Time BUN 26 (H) 04/12/2021 07:42 PM  
 Creatinine 1.62 (H) 04/12/2021 07:42 PM  
 WBC 1.4 (LL) 04/12/2021 07:44 PM  
 Procalcitonin 0.80 04/12/2021 07:42 PM  
 Lactic acid 2.0 04/12/2021 07:44 PM  
  
Estimated Creatinine Clearance: 65.8 mL/min (A) (based on SCr of 1.62 mg/dL (H)). CULTURES: 
 
 
Day 1 of vancomycin. Goal trough is 15-20mcg/ml. Vancomycin dose initiated at 2.5gm x1 then 1.5gm q18hr. Pharmacy to monitor and adjust per protocol. Will continue to follow patient and order levels when clinically indicated. Thank you for this consult, Julia Magallanes, PharmD

## 2021-04-13 NOTE — PROGRESS NOTES
0730 - Spoke with Daughter Zuly Seaman on patients cell phone and explained his condition. She's very adamant while using profanity that this is caused by the covid vaccine he received last week (his 1st dose). 9103 - Patient alert and oriented x4. Dr. Thierno Wang at bedside to talk with patient. He explained the severity of his condition and explained the alternatives of having to be put on the ventilator if his breathing doesn't improve. Patient understands and states, \" I don't want to be put on the ventilator, I'd rather die\". Dr. Thierno Wang asked him again for confirmation if this is his decision, patient stated, \"i'd rather die than be put on the ventilator\". This nurse can confirm she heard the patient's statement. Patient kept asking Dr. Thierno Wang to make sure he calls his wife and talks with her. Doctor agrees and also wants him (patient) to talk with her as well in regards to his wishes that way everyone is clear on patients decision.

## 2021-04-13 NOTE — ED NOTES
Called dr marrero to room due to pt coughing up globs of sputum every 60 seconds or so. Every time pt starts coughing sats drop to 88 and when he pulls on mask to spit 77. Dr. White Slice putting in orders and making calls.

## 2021-04-13 NOTE — PROGRESS NOTES
Chart reviewed and pt discussed in am IDR. Pt admitted to [facility] rule out CCU. Known history of Currently on BIPAP 100%. Currently levo, daphne, and vaso. MD has discussed pt condition and he does not want ventilator or to be intubated. This has been discussed with wife by MD,  as well. Currently she is out of town and will be here tonight at [address] per MD notes.  CM will follow for any assist and hospice care if needed per MD.

## 2021-04-13 NOTE — PROGRESS NOTES
Patient arrived via bed from [facility]. He was a RR called d/t sats being in 76s and MAPs 50s-60s. Patient placed on Bipap at 100%. Patient was able to transfer self from bed to the unit bed. Patient is AxO4. Throughout the rest of my shift patient c/o unable to breathe and wanting to take sips of water. Gave him a dose of morphine (See MAR) for WOB. Accessed his port to start him on pressers d/t his MAP. Maxed out on levo and started him on vaso. Patient ended up being maxed out on this as well. I had to keep explaining to him that its not safe to have drinks of water d/t risk of aspiration and his 02 sats dropping. He understood. He asked if theres anything else we can do to help him breathe and I explained to him the next thing is to intubated him and be on ventilator. States, \"I want to give it a couple more hours to see if I get better and then if I don't, I'm okay with being intubated\".

## 2021-04-13 NOTE — PROGRESS NOTES
TRANSFER - IN REPORT: 
 
Verbal report received from Harriett Ziegler (name) on Jamie Gibbs  being received from ED ES(unit) for routine progression of care Report consisted of patients Situation, Background, Assessment and  
Recommendations(SBAR). Information from the following report(s) SBAR, Kardex, ED Summary, Procedure Summary, Intake/Output, MAR and Recent Results was reviewed with the receiving nurse. Opportunity for questions and clarification was provided. Assessment completed upon patients arrival to unit and care assumed.

## 2021-04-14 NOTE — PROGRESS NOTES
..Bedside, Verbal and Written shift change report given to Dionne Crews RN (oncoming nurse) by Young Lee RN (offgoing nurse). Report included the following information SBAR, Kardex, ED Summary, Procedure Summary, Intake/Output, MAR, Accordion, Recent Results, Med Rec Status, Cardiac Rhythm NSR, Alarm Parameters , Quality Measures and Dual Neuro Assessment.

## 2021-04-14 NOTE — PROGRESS NOTES
Bedside, Verbal and Written shift change report given to Samara Machado RN (oncoming nurse) by Patricia Enciso RN (offgoing nurse). Report included the following information SBAR, Kardex, ED Summary, Procedure Summary, Intake/Output, MAR, Recent Results, Cardiac Rhythm NSR/ST and Alarm Parameters .

## 2021-04-14 NOTE — PROGRESS NOTES
Initial visit was made, prayer, emotional support and a spiritual presence were provided for the patient and his wife, Gary Ortiz.  card was left with the patient's wife. Marissa Quan, [address] [facility], [facility]

## 2021-04-14 NOTE — PROGRESS NOTES
Critical Care Daily Progress Note: 4/14/2021 Shilo Lawrence Admission Date: 4/13/2021 The patient's chart is reviewed and the patient is discussed with the staff. Patient is a 60 y.o.  male seen and evaluated at the request of Dr. Genesis Tamez Patient is a very unfortunate 66-year-old [address] male past medical history of stage IV lung cancer with brain mets who presents shortness of breath he showed up at the [address] ED shortness of breath progressive hypotension came to the floor had a rapid response came down to the ICU where his hypertensive A-line was present patient still hypotensive add another pressor IV fluids he seemed to be having irritation with features most likely from dependent on steroids may be related to his chemotherapy regimen he had no hemoptysis but had his Covid vaccine the first 1 he had last week and now he is here with shortness of breath x-ray is consistent with a what seems to be left lower lobe and left lingula infiltrate. Patient is on BiPAP. Subjective:  
 
COVID negative. Still requiring BIPAP and 2 pressors, but slightly better numbers. Still RR high 30s. States feels about the same. Has gotten multiple doses of morphine for anxiety and shortness of breath. Wife arrived last night and stayed with him. After discussion with him and his wife about options I took his BIPAP mask off for about 30 seconds to understand him and he said \"I want to go peacefully. \" They are struggling on how that works or when to stop support. His daughter has not visited, but he says he doesn't want dialysis and doesn't sound like he wants to stay on ICU level support, certainly not as long as it will be expected to take to get better. Current Facility-Administered Medications Medication Dose Route Frequency  vancomycin (VANCOCIN) 750 mg in 0.9% sodium chloride 250 mL (VIAL-MATE)  750 mg IntraVENous ONCE  
 dextrose (D50W) injection syrg 12.5-25 g  25-50 mL IntraVENous PRN  
 cefepime (MAXIPIME) 2 g in 0.9% sodium chloride (MBP/ADV) 100 mL MBP  2 g IntraVENous Q12H  
 albuterol-ipratropium (DUO-NEB) 2.5 MG-0.5 MG/3 ML  3 mL Nebulization Q4H RT  
 morphine injection 1 mg  1 mg IntraVENous K2F PRN  
 folic acid (FOLVITE) tablet 1 mg  1 mg Oral DAILY  pantoprazole (PROTONIX) tablet 40 mg  40 mg Oral DAILY  sertraline (ZOLOFT) tablet 150 mg  150 mg Oral DAILY  sodium chloride (NS) flush 5-40 mL  5-40 mL IntraVENous Q8H  
 sodium chloride (NS) flush 5-40 mL  5-40 mL IntraVENous PRN  
 acetaminophen (TYLENOL) tablet 650 mg  650 mg Oral Q4H PRN  
 magnesium hydroxide (MILK OF MAGNESIA) 400 mg/5 mL oral suspension 30 mL  30 mL Oral DAILY PRN  
 hydrocortisone Sod Succ (PF) (SOLU-CORTEF) injection 50 mg  50 mg IntraVENous Q6H  
 PHENYLephrine (MARILU-SYNEPHRINE) 30 mg in 0.9% sodium chloride 250 mL infusion   mcg/min IntraVENous TITRATE  EPINEPHrine (ADRENALIN) 8 mg in 0.9% sodium chloride 250 mL infusion  1-10 mcg/min IntraVENous TITRATE  Vancomycin Intermittent Dosing per pharmacy   1,500 mg IntraVENous See Admin Instructions  vasopressin (VASOSTRICT) 40 Units in 0.9% sodium chloride 40 mL infusion  0-0.1 Units/min IntraVENous TITRATE  
 NOREPINephrine (LEVOPHED) 16,000 mcg in dextrose 5% 250 mL infusion  0.5-30 mcg/min IntraVENous TITRATE  ondansetron (ZOFRAN) injection 4 mg  4 mg IntraVENous Q6H PRN  
 morphine injection 4 mg  4 mg IntraVENous Q2H PRN Review of Systems Constitutional:  negative for fever, chills, sweats Cardiovascular:  negative for chest pain, palpitations, syncope, edema Gastrointestinal:  negative for dysphagia, reflux, vomiting, diarrhea, abdominal pain, or melena Neurologic:  negative for focal weakness, numbness, headache Objective:  
 
Vitals:  
 04/14/21 0700 04/14/21 0715 04/14/21 0730 04/14/21 0745 BP:      
Pulse: (!) 107 (!) 106 (!) 105 (!) 102 Resp: (!) 37 (!) 36 (!) 35 (!) 38 Temp: 97.7 °F (36.5 °C) SpO2: 97% 100% 96% 96% Weight:      
Height:      
 
 
Intake/Output Summary (Last 24 hours) at 4/14/2021 5373 Last data filed at 4/14/2021 0700 Gross per 24 hour Intake 4585.17 ml Output 300 ml Net 4285.17 ml Physical Exam:         
Constitutional:  the patient is well developed and in no acute distress EENMT:  Sclera clear, pupils equal, oral mucosa moist 
Respiratory: coarse BIPAP, sats 90% on 65% FiO2 Cardiovascular:  RRR without M,G,R 
Gastrointestinal: soft and non-tender; with positive bowel sounds. Musculoskeletal: warm without cyanosis. There is no lower extremity edema. Skin:  no jaundice or rashes, no wounds Neurologic: no gross neuro deficits Psychiatric:  alert and oriented x 4 LINES: 
Venous Access Device Bard Power Port 05/05/20 Upper chest (subclavicular area, right Arterial Line 04/13/21 Left Radial artery Venous Access Device 04/13/21 Upper chest (subclavicular area, right Urinary Catheter 04/13/21 [place] DRIPS: 
  NOREPINephrine (LEVOPHED) 16,000 mcg in dextrose 5% 250 mL infusion    
  0.5-30 mcg/min, IV, TITRATE    
  Last Action:  Rate Change, 30 mcg/min at 04/14 0211    
  PHENYLephrine (MARILU-SYNEPHRINE) 30 mg in 0.9% sodium chloride 250 mL infusion    
   mcg/min, IV, TITRATE    
  Last Action:  Stopped, 04/13 1809    
  vasopressin (VASOSTRICT) 40 Units in 0.9% sodium chloride 40 mL infusion    
  0-0.1 Units/min, IV, TITRATE    
  Last Action:  New Bag, 0.09 Units/min at 04/14 9186 CXR: 4/13 LAB Recent Labs 04/14/21 
0710 04/13/21 
0202 GLUCPOC 43* 84 Recent Labs 04/14/21 
6186 04/13/21 
0756 04/12/21 
1944 WBC 1.0* 1.0* 1.4* HGB 8.1* 8.4* 9.5* HCT 24.5* 25.1* 28.7*  
PLT 30* 95* 72* Recent Labs 04/14/21 
9801 04/13/21 
0756 04/12/21 1942 * 134* 129*  
K 5.1 4.7 4.1  104 95* CO2 20* 16* 23  
GLU 63* 135* 100 BUN 42* 32* 26* CREA 3.29* 2.62* 1.62* CA 7.4* 7.6* 9. 1  
ALB  --   --  3.7 TBILI  --   --  1.4* ALT  --   --  69* Recent Labs 04/13/21 
9319 04/13/21 
0150 04/13/21 
0018 PHI 7.34* 7.32* 7.30* PCO2I 34.8* 39.3 38.8 PO2I 117* 56* 92 HCO3I 18.6* 20.2* 19.1* Recent Labs 04/13/21 
1002 04/12/21 
1944 LAC 5.6* 2.0 Recent Labs 04/13/21 
9215 04/13/21 
0150 04/13/21 
0018 PHI 7.34* 7.32* 7.30* PCO2I 34.8* 39.3 38.8 PO2I 117* 56* 92 HCO3I 18.6* 20.2* 19.1* Patient Active Problem List  
Diagnosis Code  Essential hypertension I10  
 Mass of right lung R91.8  Emphysema lung ([facility] J43.9  History of prior cigarette smoking Z87.891  Nodule of anterior chest wall R22.2  Malignant neoplasm of overlapping sites of left lung (HCC) C34.82  Brain metastases (HCC) C79.31  
 B12 deficiency E53.8  CAP (community acquired pneumonia) J18.9  Acute respiratory failure with hypoxia (HCC) J96.01  
 Septic shock (HCC) A41.9, R65.21 Assessment:  (Medical Decision Making) Hospital Problems  Date Reviewed: 4/2/2021 Codes Class Noted POA Acute respiratory failure with hypoxia ([facility] ICD-10-CM: J96.01 
ICD-9-CM: 518.81  4/13/2021 Yes * (Principal) Septic shock ([facility] ICD-10-CM: A41.9, R65.21 ICD-9-CM: 038.9, 785.52, 995.92  4/13/2021 Yes  
   
 CAP (community acquired pneumonia) ICD-10-CM: J18.9 ICD-9-CM: 119  2/26/2021 Yes Brain metastases [facility]) ICD-10-CM: C79.31 
ICD-9-CM: 198.3  6/23/2020 Yes Malignant neoplasm of overlapping sites of left lung [facility]) ICD-10-CM: C34.82 
ICD-9-CM: 162.8  6/12/2020 Yes Emphysema lung (HCC) (Chronic) ICD-10-CM: J43.9 ICD-9-CM: 492.8  4/23/2020 Yes Essential hypertension (Chronic) ICD-10-CM: I10 
ICD-9-CM: 401.9  4/17/2020 Yes Plan:  (Medical Decision Making) --remains DNR and declines dialysis --sounds to be leaning towards comfort, but they are struggling with timing 
--continue pressors and BIPAP for now 
--able to wean some, but requiring morphine for comfort as well 
--needed D50 for hypoglycemia and unable to provide nutrition for now 
--remains neutropenic 
--RAN much worse and oliguric, will eventually have further RAN complications of overload and hyperkalemia 
--recheck lactate, acidosis slightly better after multiple pushes of bicarb, bicarb gtt was stopped due to lack of additional IV access --pneumonia is about the same 
--will ask palliative to help family determine goals and timing of likely removal of support 
--remains very critically ill, but with significant limitations to care DNR The patient is critically ill with respiratory failure, circulatory failure and requires high complexity decision making for assessment and support including frequent ventilator adjustment , frequent evaluation and titration of therapies , application of advanced monitoring technologies and extensive interpretation of multiple databases Time devoted to patient care services described in this note- 12min face to face/ 20 min total evaluation time. Cumulative time devoted to patient care services by me for day of service -32 min Rosalba Sutton MD

## 2021-04-14 NOTE — CONSULTS
Palliative Care Patient: Shaheen Cornejo MRN: [id number]  SSN: [id number] YOB: 1965  Age: 60 y.o. Sex: male Date of Request: 4/14/2021 Date of Consult:  4/14/2021 Reason for Consult:  goals of care Requesting Physician: Dr Viral Schaeffer Assessment/Plan:  
 
Principal Diagnosis: Dyspnea  R06.00 Additional Diagnoses: · Acute Respiratory Failure, Unspecified  J96.00 
· Advance Care Planning Counseling Z71.89 
· Debility, Unspecified  R53.81 · Fatigue, Lethargy  R53.83 · Sepsis, Unspecified Organism:  A41.9 · Counseling, Encounter for Medical Advice  Z71.9 
· Encounter for Palliative Care  Z51.5 Palliative Performance Scale (PPS): 
  
 
Medical Decision Making:  
Reviewed and summarized notes from admission to present Discussed case with appropriate providers- ICU IDT; Dr Viral Schaeffer Reviewed laboratory and x-ray data from admission to present Pt lethargic, on BIPAP at present. No family at bedside. Pt did not attempt to open his eyes, but he did nod/shake his head to questions. He nods yes when asked if he is tired and short of breath. He denied pain. His wife has gone home to get medications- will plan to meet with both of them to discuss wishes regarding care. Discussed with Dr Viral Schaeffer. Pt indicated to him that he was tired in general, and ready to focus on comfort. Will continue to follow. 1400-  Spoke with pt's wife, Angella Booth, at bedside. Pt remains lethargic, on BIPAP. Introduced role of PC and reviewed events. Angella Booth has very good understanding of pt's condition and wishes moving forward. We discussed comfort measures, and medications used for comfort. Angella Booth would like to wait until their daughter arrives before transitioning to comfort. She spoke of the loss of their son 8 months ago to cancer. She is still grappling with this loss, and is at risk for complicated grief. Provided support. Will continue to follow.   
 
Will discuss findings with members of the interdisciplinary team.   
 
Thank you for this referral.    
 
  
. 
 
Subjective:  
 
History obtained from:  Patient, Family, Care Provider and Chart Chief Complaint: Pneumonia, respiratory failure History of Present Illness:  Mr Riaz Dubose is a 59 yo male with PMH of metastatic lung cancer, depression, and HTN, who presented to the ER from home on 2021 with c/o dyspnea, generalized body aches, fever, chills, and dry cough for a few days. He received his first COVID vaccine the week prior. Pt denied chest pain, n/v/d. Work up revealed hypoxia on RA, CAP, and sepsis. Pt was COVID 19 negative. Pt was admitted for further management. He was hypoxic on airvo, and noted to be hypotensive. He was transferred to the ICU. He is currently on BIPAP, and requiring pressors. Pt is a DNR, per request.  
  
Advance Directive: No      
Code Status:  DNR Health Care Power of : No - Patient does not have a [facility]. Past Medical History:  
Diagnosis Date  Brain metastases ([facility] 2020  Cancer ([facility] lung  Depression  Hypertension   
 managed with meds Past Surgical History:  
Procedure Laterality Date  HX ORTHOPAEDIC    
 right knee  HX VASCULAR ACCESS    
 IR BX PANCREAS NEEDLE PERC    
 IR INSERT TUNL CVC W PORT OVER 5 YEARS  2020 No family history on file. Social History Tobacco Use  Smoking status: Former Smoker Packs/day: 1.50 Years: 25.00 Pack years: 37.50 Quit date:  Years since quittin.2  Smokeless tobacco: Former User Quit date: 2005 Substance Use Topics  Alcohol use: Yes Comment: socially Prior to Admission medications Medication Sig Start Date End Date Taking? Authorizing Provider  
predniSONE (DELTASONE) 20 mg tablet Take 60 mg by mouth daily. 3/26/21   Damaso Matute MD  
pantoprazole (PROTONIX) 40 mg tablet Take 1 Tab by mouth daily.  21   Liberty Cleve Dee NP  
doxycycline (MONODOX) 100 mg capsule Take 1 Cap by mouth two (2) times a day. 2/15/21   Yazan Pruitt MD  
albuterol (PROVENTIL HFA, VENTOLIN HFA, PROAIR HFA) 90 mcg/actuation inhaler Take 2 Puffs by inhalation every four (4) hours as needed for Wheezing. 2/15/21   Yazan Pruitt MD  
azithromycin [facility]) 250 mg tablet Take two tablets today then one tablet daily 2/11/21   Sonny Mcfarlane MD  
lisinopriL (PRINIVIL, ZESTRIL) 20 mg tablet Take 1 Tab by mouth daily. Indications: high blood pressure 11/5/20   Sonny Mcfarlane MD  
folic acid (FOLVITE) 1 mg tablet TAKE 1 TABLET BY MOUTH EVERY DAY 11/5/20   Sonny Mcfarlane MD  
zolpidem (Ambien) 5 mg tablet Take 1 Tab by mouth nightly as needed for Sleep (insomnia). Max Daily Amount: 5 mg. 10/7/20   Nargis Dupont NP  
sertraline (ZOLOFT) 100 mg tablet Take 1.5 Tabs by mouth daily. Indications: anxiousness associated with depression 8/19/20   Sonny Mcfarlane MD  
dexAMETHasone (Decadron) 4 mg tablet Take 4 mg by mouth two (2) times daily (with meals). 6/24/20   Deisy Baez MD  
lidocaine-prilocaine (EMLA) topical cream Apply  to affected area as needed for Pain. Apply 45 min to port site prior to needle stick 5/8/20   Deisy Baez MD  
 
 
No Known Allergies Review of Systems: A comprehensive review of systems was negative except for:  
Constitutional: Positive for fatigiue. Respiratory: Positive for dyspnea Objective:  
 
Visit Vitals BP (!) 108/53 (BP 1 Location: Right upper arm, BP Patient Position: At rest) Pulse (!) 102 Temp 97.7 °F (36.5 °C) Resp (!) 38 Ht 5' 11\" (1.803 m) Wt 260 lb 9.3 oz (118.2 kg) SpO2 96% BMI 36.34 kg/m² Physical Exam: 
 
General:  Lethargic. Debilitated. No acute distress. Eyes:  Conjunctivae/corneas clear Nose: Nares normal. Septum midline. BIPAP mask Neck: Supple, symmetrical, trachea midline Lungs:   Coarse bilaterally, mildly labored Heart: Regular rate and rhythm Abdomen:   Soft, non-tender, non-distended Extremities: Normal, atraumatic, no cyanosis or edema Skin: Skin color, texture, turgor normal.   
Neurologic: Nonfocal  
Psych: Lethargic Assessment:  
 
Hospital Problems  Date Reviewed: 4/2/2021 Codes Class Noted POA Acute respiratory failure with hypoxia ([facility] ICD-10-CM: J96.01 
ICD-9-CM: 518.81  4/13/2021 Yes * (Principal) Septic shock ([facility] ICD-10-CM: A41.9, R65.21 ICD-9-CM: 038.9, 785.52, 995.92  4/13/2021 Yes  
   
 CAP (community acquired pneumonia) ICD-10-CM: J18.9 ICD-9-CM: 335  2/26/2021 Yes Brain metastases [facility]) ICD-10-CM: C79.31 
ICD-9-CM: 198.3  6/23/2020 Yes Malignant neoplasm of overlapping sites of left lung [facility]) ICD-10-CM: C34.82 
ICD-9-CM: 162.8  6/12/2020 Yes Emphysema lung (HCC) (Chronic) ICD-10-CM: J43.9 ICD-9-CM: 492.8  4/23/2020 Yes Essential hypertension (Chronic) ICD-10-CM: I10 
ICD-9-CM: 401.9  4/17/2020 Yes Signed By: Nazario Verdin NP April 14, 2021

## 2021-04-14 NOTE — PROGRESS NOTES
Pt on continuous BIPAP - pt in no distress & resting comfortably at this time 04/13/21 2018 Oxygen Therapy O2 Sat (%) 98 % Pulse via Oximetry 86 beats per minute O2 Device BIPAP Skin Assessment Clean, dry, & intact FIO2 (%) 65 % Respiratory Respiratory (WDL) X Respiratory Pattern Tachypneic Chest/Tracheal Assessment Chest expansion, symmetrical  
Breath Sounds Bilateral Diminished CPAP/BIPAP Device Mode BIPAP  
$$ Bipap Daily Yes Mask Type and Size Full face; Medium Skin Condition intact PIP Observed 18 cm H20 IPAP (cm H2O) 16 cm H2O  
EPAP (cm H2O) 10 cm H2O Inspiratory Time (sec) 0.9 seconds Vt Spont (ml) 580 ml Ve Observed (l/min) 19.9 l/min Backup Rate 14 Total RR (Spontaneous) 39 breaths per minute Insp Rise Time (sec) 3 Leak (Estimated) 18 L/min  
Pt's Home Machine No  
Biomedical Check Performed Yes Settings Verified Yes Alarm Settings High Pressure 30 Low Pressure 5 Apnea 20 Low Ve 4 High Rate 50 Low Rate 8 Pulmonary Toilet Pulmonary Toilet H. O.B elevated;Cough and deep breath

## 2021-04-15 NOTE — PROGRESS NOTES
A follow up visit was made to the patient. Emotional support, spiritual presence and the assurance of 
prayer were provided for the patient. His wife, Sandrnie Lazar was present. Jerome Olmstead, [facility]ly [facility], [facility]

## 2021-04-15 NOTE — PROGRESS NOTES
Patient  with his family at the bedside. His family members are grieving appropriately.  provided empathy, comfort, a spiritual presence, emotional support and prayer. ROSE Welch

## 2021-04-15 NOTE — DISCHARGE SUMMARY
Death Summary Shilo Lawrence Admission date:  4/13/2021 Discharge date:   
 
Admitting Diagnosis:  Acute respiratory failure with hypoxia ([facility] [J96.01] Discharge Diagnosis:   
Problem List as of 4/15/2021 Date Reviewed: 4/2/2021 Codes Class Noted - Resolved Acute respiratory failure with hypoxia [facility]) ICD-10-CM: J96.01 
ICD-9-CM: 518.81  4/13/2021 - Present * (Principal) Septic shock ([facility] ICD-10-CM: A41.9, R65.21 ICD-9-CM: 038.9, 785.52, 995.92  4/13/2021 - Present CAP (community acquired pneumonia) ICD-10-CM: J18.9 ICD-9-CM: 336  2/26/2021 - Present B12 deficiency ICD-10-CM: E53.8 ICD-9-CM: 266.2  10/3/2020 - Present Brain metastases [facility]) ICD-10-CM: C79.31 
ICD-9-CM: 198.3  6/23/2020 - Present Malignant neoplasm of overlapping sites of left lung [facility]) ICD-10-CM: C34.82 
ICD-9-CM: 162.8  6/12/2020 - Present Nodule of anterior chest wall ICD-10-CM: R22.2 ICD-9-CM: 786.6  4/29/2020 - Present Emphysema lung (HCC) (Chronic) ICD-10-CM: J43.9 ICD-9-CM: 492.8  4/23/2020 - Present History of prior cigarette smoking ICD-10-CM: Z87.891 ICD-9-CM: V15.82  4/23/2020 - Present Essential hypertension (Chronic) ICD-10-CM: I10 
ICD-9-CM: 401.9  4/17/2020 - Present Mass of right lung ICD-10-CM: R91.8 ICD-9-CM: 786.6  4/17/2020 - Present Consultants: 
Palliative care Studies/Procedures: CXR Hospital course: 
Patient is R 57 y.o.  male seen and evaluated at the request of Dr. Genesis Tamez Patient is a very unfortunate 57-year-old [address] male past medical history of stage IV lung cancer with brain mets who presents shortness of breath [facility] [address] [place] [address] ED shortness of breath progressive hypotension came to the floor had a rapid response came down to the ICU where his hypertensive A-line was present patient still hypotensive add another pressor IV fluids he seemed to be having irritation with features most likely from dependent on steroids may be related to his chemotherapy regimen he had no hemoptysis but had his Covid vaccine the first 1 he had last week and now he is here with shortness of breath x-ray is consistent with a what seems to be left lower lobe and left lingula infiltrate.  Patient is placed BiPAP and transferred to ICU where his condition continued to deteriorate and he became completely BiPAP dependent, he expressed his wished to [facility] peacefully\" when off BiPAP briefly and was subsequently made DNR. Today he was found to be actively dying and after discussion with his wife comfort measures were implemented and the patient  peacefully with his wife and daughter at the bedside at 09:47 PM 
 
Final: 
--Pronounced dead at 9:47 AM. --Total discharge greater than 30 minutes in duration. -- Cause of death: 
Cardio-respiratory arrest due to respiratory failure due to CAP. Contributing factor Stage IV metastatic lung cancer.  
 
Jamey Bliss MD

## 2021-04-15 NOTE — PROGRESS NOTES
Care Management Interventions PCP Verified by CM: Yes Mode of Transport at Discharge: Other (see comment) Transition of Care Consult (CM Consult): Discharge Planning Confirm Follow Up Transport: Family Freedom of Choice List was Provided with Basic Dialogue that Supports the Patient's Individualized Plan of Care/Goals, Treatment Preferences and Shares the Quality Data Associated with the Providers?: Yes [place] Resource Information Provided?: Jaden MORENO) Discharge Location Discharge Placement:

## 2021-04-15 NOTE — PROGRESS NOTES
Bedside shift change report given to Geovany Dutton, RN and Gee Curry RN (oncoming nurse) by Greta Young RN (offgoing nurse). Report included the following information SBAR, Kardex, Intake/Output, MAR, Recent Results, Med Rec Status, Cardiac Rhythm NSR/ST and Alarm Parameters .

## 2021-04-17 LAB
BACTERIA SPEC CULT: NORMAL
BACTERIA SPEC CULT: NORMAL
SERVICE CMNT-IMP: NORMAL
SERVICE CMNT-IMP: NORMAL

## 2023-04-27 NOTE — PROCEDURES
PROCEDURE NOTE ARTERIAL LINE PLACEMENT 
04/13/21 
4:30 AM 
 
Indication: shock in need of hemodynamic monitoring A time-out was completed verifying correct patient, procedure, site, positioning, and special equipment if applicable. Coltons test was performed to ensure adequate perfusion. The patients left wrist was prepped and draped in sterile fashion. 1% Lidocaine was used to anesthetize the area. A 20G Arrow arterial line was introduced into the left radial artery. The catheter was threaded over the guide wire and the needle was removed with appropriate pulsatile blood return. The catheter was then sutured in place to the skin and a sterile dressing applied. Perfusion to the extremity distal to the point of catheter insertion was checked and found to be adequate. The patient tolerated the procedure well and there were no complications.  
 
Adilene Davies MD 
 Attending Attestation (For Attendings USE Only)...

## (undated) DEVICE — KENDALL RADIOLUCENT FOAM MONITORING ELECTRODE RECTANGULAR SHAPE: Brand: KENDALL

## (undated) DEVICE — ENDOSCOPIC ULTRASOUND FINE NEEDLE BIOPSY (FNB) DEVICE: Brand: ACQUIRE

## (undated) DEVICE — SINGLE USE SUCTION VALVE MAJ-209: Brand: SINGLE USE SUCTION VALVE (STERILE)

## (undated) DEVICE — FORCEPS BX L240CM JAW DIA2.8MM L CAP W/ NDL MIC MESH TOOTH

## (undated) DEVICE — MOUTHPIECE ENDOSCP 20X27MM --

## (undated) DEVICE — SET EXTN L6IN W/ S STL CLMP

## (undated) DEVICE — BLLN KT O RING ENDOSCP US --

## (undated) DEVICE — CANNULA NSL ORAL AD FOR CAPNOFLEX CO2 O2 AIRLFE

## (undated) DEVICE — SINGLE USE BIOPSY VALVE MAJ-210: Brand: SINGLE USE BIOPSY VALVE (STERILE)

## (undated) DEVICE — BLOCK BITE AD 60FR W/ VELC STRP ADDRESSES MOST PT AND

## (undated) DEVICE — SPONGE GZ W4XL4IN COT 12 PLY TYP VII WVN C FLD DSGN

## (undated) DEVICE — (D)SYR 10ML 1/5ML GRAD NSAF -- PKGING CHANGE USE ITEM 338027

## (undated) DEVICE — CONNECTOR TBNG OD5-7MM O2 END DISP

## (undated) DEVICE — BRONCHOSCOPY PACK: Brand: MEDLINE INDUSTRIES, INC.

## (undated) DEVICE — STERILE POLYISOPRENE POWDER-FREE SURGICAL GLOVES: Brand: PROTEXIS

## (undated) DEVICE — AIRLIFE™ OXYGEN TUBING 7 FEET (2.1 M) CRUSH RESISTANT OXYGEN TUBING, VINYL TIPPED: Brand: AIRLIFE™

## (undated) DEVICE — SOL INJ SOD CL0.9% 10ML PREFIL --